# Patient Record
Sex: FEMALE | Race: WHITE | NOT HISPANIC OR LATINO | Employment: FULL TIME | URBAN - METROPOLITAN AREA
[De-identification: names, ages, dates, MRNs, and addresses within clinical notes are randomized per-mention and may not be internally consistent; named-entity substitution may affect disease eponyms.]

---

## 2020-01-30 ENCOUNTER — LAB VISIT (OUTPATIENT)
Dept: LAB | Facility: HOSPITAL | Age: 30
End: 2020-01-30
Attending: FAMILY MEDICINE
Payer: COMMERCIAL

## 2020-01-30 DIAGNOSIS — Z01.89 D DIMER VALUE UNKNOWN: Primary | ICD-10-CM

## 2020-01-30 LAB — D DIMER PPP FEU-MCNC: 135 NG/ML (ref 100–400)

## 2020-01-30 PROCEDURE — 85379 FIBRIN DEGRADATION QUANT: CPT

## 2020-01-30 PROCEDURE — 36415 COLL VENOUS BLD VENIPUNCTURE: CPT

## 2021-04-19 ENCOUNTER — OFFICE VISIT (OUTPATIENT)
Dept: URGENT CARE | Facility: CLINIC | Age: 31
End: 2021-04-19
Payer: COMMERCIAL

## 2021-04-19 VITALS
DIASTOLIC BLOOD PRESSURE: 83 MMHG | OXYGEN SATURATION: 97 % | SYSTOLIC BLOOD PRESSURE: 127 MMHG | BODY MASS INDEX: 44.41 KG/M2 | HEIGHT: 68 IN | HEART RATE: 93 BPM | TEMPERATURE: 97 F | WEIGHT: 293 LBS

## 2021-04-19 DIAGNOSIS — J02.9 ACUTE PHARYNGITIS, UNSPECIFIED ETIOLOGY: Primary | ICD-10-CM

## 2021-04-19 PROCEDURE — 99204 OFFICE O/P NEW MOD 45 MIN: CPT | Mod: S$GLB,,, | Performed by: NURSE PRACTITIONER

## 2021-04-19 PROCEDURE — 99204 PR OFFICE/OUTPT VISIT, NEW, LEVL IV, 45-59 MIN: ICD-10-PCS | Mod: S$GLB,,, | Performed by: NURSE PRACTITIONER

## 2021-04-19 RX ORDER — AMOXICILLIN 500 MG/1
500 TABLET, FILM COATED ORAL EVERY 12 HOURS
Qty: 20 TABLET | Refills: 0 | Status: SHIPPED | OUTPATIENT
Start: 2021-04-19 | End: 2021-04-29

## 2023-04-07 ENCOUNTER — OFFICE VISIT (OUTPATIENT)
Dept: URGENT CARE | Facility: CLINIC | Age: 33
End: 2023-04-07
Payer: COMMERCIAL

## 2023-04-07 VITALS
WEIGHT: 293 LBS | HEART RATE: 108 BPM | BODY MASS INDEX: 44.41 KG/M2 | TEMPERATURE: 98 F | HEIGHT: 68 IN | DIASTOLIC BLOOD PRESSURE: 78 MMHG | OXYGEN SATURATION: 97 % | SYSTOLIC BLOOD PRESSURE: 130 MMHG | RESPIRATION RATE: 16 BRPM

## 2023-04-07 DIAGNOSIS — H10.9 BACTERIAL CONJUNCTIVITIS OF RIGHT EYE: Primary | ICD-10-CM

## 2023-04-07 PROCEDURE — 99213 OFFICE O/P EST LOW 20 MIN: CPT | Mod: S$GLB,,, | Performed by: NURSE PRACTITIONER

## 2023-04-07 PROCEDURE — 99213 PR OFFICE/OUTPT VISIT, EST, LEVL III, 20-29 MIN: ICD-10-PCS | Mod: S$GLB,,, | Performed by: NURSE PRACTITIONER

## 2023-04-07 RX ORDER — CIPROFLOXACIN HYDROCHLORIDE 3 MG/ML
2 SOLUTION/ DROPS OPHTHALMIC EVERY 4 HOURS
Qty: 10 ML | Refills: 0 | Status: SHIPPED | OUTPATIENT
Start: 2023-04-07 | End: 2023-04-14

## 2023-04-07 RX ORDER — OFLOXACIN 3 MG/ML
2 SOLUTION/ DROPS OPHTHALMIC 4 TIMES DAILY
Qty: 5 ML | Refills: 0 | Status: SHIPPED | OUTPATIENT
Start: 2023-04-07 | End: 2023-04-07 | Stop reason: RX

## 2023-04-07 NOTE — PROGRESS NOTES
"Subjective:      Patient ID: Petty Donato is a 32 y.o. female.    Vitals:  height is 5' 8" (1.727 m) and weight is 177.8 kg (392 lb) (abnormal). Her temperature is 97.9 °F (36.6 °C). Her blood pressure is 130/78 and her pulse is 108. Her respiration is 16 and oxygen saturation is 97%.     Chief Complaint: Conjunctivitis    Pt states her right eye is red, states it began to itch today. started this morning but has gotten worse. Denies any visual changes. No contact lens use.    Constitution: Negative for chills and fever.   HENT:  Negative for ear pain.    Cardiovascular:  Negative for chest pain, palpitations and sob on exertion.   Eyes:  Positive for eye discharge, eye itching and eye redness. Negative for eye trauma, foreign body in eye, eye pain and photophobia.   Respiratory:  Negative for shortness of breath.     Objective:     Physical Exam   Constitutional: She is oriented to person, place, and time. She appears well-developed. She is cooperative.  Non-toxic appearance. She does not appear ill. No distress.   HENT:   Head: Normocephalic and atraumatic.   Ears:   Right Ear: External ear normal.   Left Ear: External ear normal.   Nose: Nose normal.   Mouth/Throat: Oropharynx is clear and moist and mucous membranes are normal. Mucous membranes are moist. Oropharynx is clear.   Eyes: Lids are normal. Pupils are equal, round, and reactive to light. Lids are everted and swept, no foreign bodies found. Right eye exhibits discharge (clear/watery). Right conjunctiva is injected. No scleral icterus. Extraocular movement intact vision grossly intact gaze aligned appropriately periorbital hyperpigmentation  Neck: Trachea normal and phonation normal. Neck supple.   Cardiovascular: Normal rate, regular rhythm, normal heart sounds and normal pulses.   Pulmonary/Chest: Effort normal and breath sounds normal.   Abdominal: Normal appearance.   Musculoskeletal:         General: No deformity.   Neurological: She is alert and " oriented to person, place, and time. She has normal strength and normal reflexes. No sensory deficit.   Skin: Skin is warm, dry, intact and not diaphoretic. Capillary refill takes 2 to 3 seconds.   Psychiatric: Her speech is normal and behavior is normal. Judgment and thought content normal.   Nursing note and vitals reviewed.    Assessment:     1. Bacterial conjunctivitis of right eye        Plan:       Bacterial conjunctivitis of right eye  -     ofloxacin (OCUFLOX) 0.3 % ophthalmic solution; Place 2 drops into the right eye 4 (four) times daily. for 7 days  Dispense: 5 mL; Refill: 0        I have discussed the physical exam findings with the patient. We discussed symptom monitoring, conservative care methods, medication use, and follow up orders. She verbalized understanding and agreement with the plan of care.          Polytrim and ofloxacin unavailable at pharmacy.  Ciprofloxacin E scribed.  Patient notified

## 2023-04-07 NOTE — PATIENT INSTRUCTIONS
Use ofloxacin drops as ordered  Do not touch face or wipe eyes if possible. Keep hands washed.  Do not allow others to touch eyes or face.  You may consider yourself non contagious after 24 hours of eyedrop application  After 3 days of treatment, wash all bedding and pillow cases  Follow-up with ophthalmology in 2-3 days  Return to this clinic for any new concerns

## 2023-04-18 ENCOUNTER — HOSPITAL ENCOUNTER (OUTPATIENT)
Dept: RADIOLOGY | Facility: HOSPITAL | Age: 33
Discharge: HOME | End: 2023-04-18
Attending: NURSE PRACTITIONER
Payer: COMMERCIAL

## 2023-04-18 DIAGNOSIS — M25.552 PAIN IN LEFT HIP: ICD-10-CM

## 2023-04-18 DIAGNOSIS — M79.671 PAIN IN RIGHT FOOT: ICD-10-CM

## 2023-04-18 DIAGNOSIS — M54.50 LOW BACK PAIN: ICD-10-CM

## 2023-06-15 ENCOUNTER — HOSPITAL ENCOUNTER (EMERGENCY)
Facility: HOSPITAL | Age: 33
Discharge: HOME | End: 2023-06-15
Attending: EMERGENCY MEDICINE
Payer: COMMERCIAL

## 2023-06-15 VITALS
OXYGEN SATURATION: 98 % | RESPIRATION RATE: 18 BRPM | SYSTOLIC BLOOD PRESSURE: 130 MMHG | HEART RATE: 84 BPM | TEMPERATURE: 98 F | DIASTOLIC BLOOD PRESSURE: 70 MMHG
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
## 2023-06-15 DIAGNOSIS — J10.1 INFLUENZA A: ICD-10-CM

## 2023-06-15 DIAGNOSIS — S42.331A CLOSED DISPLACED OBLIQUE FRACTURE OF SHAFT OF RIGHT HUMERUS, INITIAL ENCOUNTER: Primary | ICD-10-CM

## 2023-06-15 LAB
B-HCG UR QL: NEGATIVE
INFLUENZA A, MOLECULAR: POSITIVE
INFLUENZA B, MOLECULAR: NEGATIVE
SARS-COV-2 RDRP RESP QL NAA+PROBE: NEGATIVE
SPECIMEN SOURCE: ABNORMAL

## 2023-06-15 PROCEDURE — 81025 URINE PREGNANCY TEST: CPT | Performed by: EMERGENCY MEDICINE

## 2023-06-15 PROCEDURE — 96372 THER/PROPH/DIAG INJ SC/IM: CPT | Performed by: EMERGENCY MEDICINE

## 2023-06-15 PROCEDURE — U0002 COVID-19 LAB TEST NON-CDC: HCPCS | Performed by: EMERGENCY MEDICINE

## 2023-06-15 PROCEDURE — 87502 INFLUENZA DNA AMP PROBE: CPT | Performed by: EMERGENCY MEDICINE

## 2023-06-15 PROCEDURE — 99284 EMERGENCY DEPT VISIT MOD MDM: CPT

## 2023-06-15 PROCEDURE — 63600175 PHARM REV CODE 636 W HCPCS: Performed by: EMERGENCY MEDICINE

## 2023-06-15 RX ORDER — MORPHINE SULFATE 10 MG/ML
5 INJECTION INTRAMUSCULAR; INTRAVENOUS; SUBCUTANEOUS
Status: COMPLETED | OUTPATIENT
Start: 2023-06-15 | End: 2023-06-15

## 2023-06-15 RX ORDER — HYDROCODONE BITARTRATE AND ACETAMINOPHEN 5; 325 MG/1; MG/1
1 TABLET ORAL EVERY 6 HOURS PRN
Qty: 10 TABLET | Refills: 0 | Status: SHIPPED | OUTPATIENT
Start: 2023-06-15 | End: 2023-06-20 | Stop reason: SDUPTHER

## 2023-06-15 RX ADMIN — MORPHINE SULFATE 5 MG: 10 INJECTION INTRAVENOUS at 04:06

## 2023-06-15 NOTE — ED NOTES
Went over discharge instructions with patient.  Patient verbalizes understanding of home care instructions.  Provided written instructions as well.  VS stable.  Pt ambulatory off floor.
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
## 2023-06-15 NOTE — Clinical Note
"Petty Ca" Tanmay was seen and treated in our emergency department on 6/15/2023.  She may return to work on 06/19/2023.       If you have any questions or concerns, please don't hesitate to call.      Analia ANDERSON RN    "

## 2023-06-20 ENCOUNTER — PATIENT MESSAGE (OUTPATIENT)
Dept: ORTHOPEDICS | Facility: CLINIC | Age: 33
End: 2023-06-20

## 2023-06-20 ENCOUNTER — OFFICE VISIT (OUTPATIENT)
Dept: ORTHOPEDICS | Facility: CLINIC | Age: 33
End: 2023-06-20
Payer: COMMERCIAL

## 2023-06-20 VITALS — WEIGHT: 293 LBS | BODY MASS INDEX: 44.41 KG/M2 | RESPIRATION RATE: 16 BRPM | HEIGHT: 68 IN

## 2023-06-20 DIAGNOSIS — S42.491A OTHER CLOSED DISPLACED FRACTURE OF DISTAL END OF RIGHT HUMERUS, INITIAL ENCOUNTER: ICD-10-CM

## 2023-06-20 DIAGNOSIS — S42.491A OTHER CLOSED DISPLACED FRACTURE OF DISTAL END OF RIGHT HUMERUS, INITIAL ENCOUNTER: Primary | ICD-10-CM

## 2023-06-20 PROCEDURE — 99999 PR PBB SHADOW E&M-EST. PATIENT-LVL III: CPT | Mod: PBBFAC,,, | Performed by: ORTHOPAEDIC SURGERY

## 2023-06-20 PROCEDURE — 99204 PR OFFICE/OUTPT VISIT, NEW, LEVL IV, 45-59 MIN: ICD-10-PCS | Mod: S$GLB,,, | Performed by: ORTHOPAEDIC SURGERY

## 2023-06-20 PROCEDURE — 99204 OFFICE O/P NEW MOD 45 MIN: CPT | Mod: S$GLB,,, | Performed by: ORTHOPAEDIC SURGERY

## 2023-06-20 PROCEDURE — 99999 PR PBB SHADOW E&M-EST. PATIENT-LVL III: ICD-10-PCS | Mod: PBBFAC,,, | Performed by: ORTHOPAEDIC SURGERY

## 2023-06-20 RX ORDER — HYDROCODONE BITARTRATE AND ACETAMINOPHEN 5; 325 MG/1; MG/1
1 TABLET ORAL EVERY 6 HOURS PRN
Qty: 28 TABLET | Refills: 0 | Status: ON HOLD | OUTPATIENT
Start: 2023-06-20 | End: 2023-06-23 | Stop reason: HOSPADM

## 2023-06-20 RX ORDER — HYDROCODONE BITARTRATE AND ACETAMINOPHEN 5; 325 MG/1; MG/1
1 TABLET ORAL EVERY 6 HOURS PRN
Qty: 28 TABLET | Refills: 0 | Status: CANCELLED | OUTPATIENT
Start: 2023-06-20
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
## 2023-06-21 DIAGNOSIS — S42.491A OTHER CLOSED DISPLACED FRACTURE OF DISTAL END OF RIGHT HUMERUS, INITIAL ENCOUNTER: Primary | ICD-10-CM

## 2023-06-21 DIAGNOSIS — S42.401A CLOSED FRACTURE OF RIGHT DISTAL HUMERUS: ICD-10-CM

## 2023-06-21 RX ORDER — SODIUM CHLORIDE 0.9 % (FLUSH) 0.9 %
10 SYRINGE (ML) INJECTION EVERY 6 HOURS PRN
Status: DISCONTINUED | OUTPATIENT
Start: 2023-06-21 | End: 2023-11-16

## 2023-06-22 ENCOUNTER — OFFICE VISIT (OUTPATIENT)
Dept: ORTHOPEDICS | Facility: CLINIC | Age: 33
End: 2023-06-22
Payer: COMMERCIAL

## 2023-06-22 VITALS — HEIGHT: 68 IN | WEIGHT: 293 LBS | BODY MASS INDEX: 44.41 KG/M2

## 2023-06-22 DIAGNOSIS — S42.491A OTHER CLOSED DISPLACED FRACTURE OF DISTAL END OF RIGHT HUMERUS, INITIAL ENCOUNTER: Primary | ICD-10-CM

## 2023-06-22 PROCEDURE — 99214 OFFICE O/P EST MOD 30 MIN: CPT | Mod: 57,S$GLB,, | Performed by: ORTHOPAEDIC SURGERY

## 2023-06-22 PROCEDURE — 99999 PR PBB SHADOW E&M-EST. PATIENT-LVL III: CPT | Mod: PBBFAC,,, | Performed by: ORTHOPAEDIC SURGERY

## 2023-06-22 PROCEDURE — 99999 PR PBB SHADOW E&M-EST. PATIENT-LVL III: ICD-10-PCS | Mod: PBBFAC,,, | Performed by: ORTHOPAEDIC SURGERY

## 2023-06-22 PROCEDURE — 99214 PR OFFICE/OUTPT VISIT, EST, LEVL IV, 30-39 MIN: ICD-10-PCS | Mod: 57,S$GLB,, | Performed by: ORTHOPAEDIC SURGERY

## 2023-06-23 PROBLEM — S42.401A CLOSED FRACTURE OF RIGHT DISTAL HUMERUS: Status: ACTIVE | Noted: 2023-06-23
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
## 2023-06-27 DIAGNOSIS — S42.491A OTHER CLOSED DISPLACED FRACTURE OF DISTAL END OF RIGHT HUMERUS, INITIAL ENCOUNTER: Primary | ICD-10-CM

## 2023-06-27 RX ORDER — OXYCODONE AND ACETAMINOPHEN 10; 325 MG/1; MG/1
1 TABLET ORAL EVERY 6 HOURS PRN
Qty: 22 TABLET | Refills: 0 | Status: SHIPPED | OUTPATIENT
Start: 2023-06-27 | End: 2023-08-15

## 2023-07-06 ENCOUNTER — HOSPITAL ENCOUNTER (OUTPATIENT)
Dept: RADIOLOGY | Facility: HOSPITAL | Age: 33
Discharge: HOME | End: 2023-07-06
Attending: ORTHOPAEDIC SURGERY
Payer: COMMERCIAL

## 2023-07-06 ENCOUNTER — OFFICE VISIT (OUTPATIENT)
Dept: ORTHOPEDICS | Facility: CLINIC | Age: 33
End: 2023-07-06
Payer: COMMERCIAL

## 2023-07-06 VITALS — BODY MASS INDEX: 44.41 KG/M2 | HEIGHT: 68 IN | WEIGHT: 293 LBS

## 2023-07-06 DIAGNOSIS — S42.491A OTHER CLOSED DISPLACED FRACTURE OF DISTAL END OF RIGHT HUMERUS, INITIAL ENCOUNTER: ICD-10-CM

## 2023-07-06 DIAGNOSIS — S42.491D OTHER CLOSED DISPLACED FRACTURE OF DISTAL END OF RIGHT HUMERUS WITH ROUTINE HEALING, SUBSEQUENT ENCOUNTER: Primary | ICD-10-CM

## 2023-07-06 PROCEDURE — 99999 PR PBB SHADOW E&M-EST. PATIENT-LVL II: CPT | Mod: PBBFAC,,, | Performed by: ORTHOPAEDIC SURGERY

## 2023-07-06 PROCEDURE — 99024 PR POST-OP FOLLOW-UP VISIT: ICD-10-PCS | Mod: S$GLB,,, | Performed by: ORTHOPAEDIC SURGERY

## 2023-07-06 PROCEDURE — 73060 X-RAY EXAM OF HUMERUS: CPT | Mod: TC,PO,RT

## 2023-07-06 PROCEDURE — 73060 XR HUMERUS 2 VIEW RIGHT: ICD-10-PCS | Mod: 26,RT,, | Performed by: RADIOLOGY

## 2023-07-06 PROCEDURE — 99024 POSTOP FOLLOW-UP VISIT: CPT | Mod: S$GLB,,, | Performed by: ORTHOPAEDIC SURGERY

## 2023-07-06 PROCEDURE — 73060 X-RAY EXAM OF HUMERUS: CPT | Mod: 26,RT,, | Performed by: RADIOLOGY

## 2023-07-06 PROCEDURE — 99999 PR PBB SHADOW E&M-EST. PATIENT-LVL II: ICD-10-PCS | Mod: PBBFAC,,, | Performed by: ORTHOPAEDIC SURGERY

## 2023-07-06 NOTE — PROGRESS NOTES
Chief Complaint   Patient presents with    Right Upper Arm - Pain, Post-op Evaluation       HPI:  33 y.o. returns to clinic today status post  right humerus ORIF 2 weeks ago. Pain is mild. Patient is compliant most of the time with restrictions.     RUE    Incision healed. No erythema or fluctuance. Overall normal alignment. Mild point TTP about the fracture site. Decreased ROM due to stiffness. Compartments soft. Skin intact. NVI distally.      X-rays were performed today, personally reviewed by me and findings discussed with the patient.  2 views of the right humerus show hardware intact in good position    Other closed displaced fracture of distal end of right humerus with routine healing, subsequent encounter        Staples out. Elbow ROM. RTC 4-6 weeks Shayla at which time we will advance wb.

## 2023-07-21 ENCOUNTER — PATIENT MESSAGE (OUTPATIENT)
Dept: ORTHOPEDICS | Facility: CLINIC | Age: 33
End: 2023-07-21
Payer: COMMERCIAL

## 2023-07-31 DIAGNOSIS — S42.491D OTHER CLOSED DISPLACED FRACTURE OF DISTAL END OF RIGHT HUMERUS WITH ROUTINE HEALING, SUBSEQUENT ENCOUNTER: Primary | ICD-10-CM

## 2023-08-03 ENCOUNTER — OFFICE VISIT (OUTPATIENT)
Dept: ORTHOPEDICS | Facility: CLINIC | Age: 33
End: 2023-08-03
Payer: COMMERCIAL

## 2023-08-03 ENCOUNTER — HOSPITAL ENCOUNTER (OUTPATIENT)
Dept: RADIOLOGY | Facility: HOSPITAL | Age: 33
Discharge: HOME | End: 2023-08-03
Attending: NURSE PRACTITIONER
Payer: COMMERCIAL

## 2023-08-03 VITALS — BODY MASS INDEX: 44.41 KG/M2 | WEIGHT: 293 LBS | HEIGHT: 68 IN

## 2023-08-03 DIAGNOSIS — Z98.890 S/P ORIF (OPEN REDUCTION INTERNAL FIXATION) FRACTURE: ICD-10-CM

## 2023-08-03 DIAGNOSIS — S42.491D OTHER CLOSED DISPLACED FRACTURE OF DISTAL END OF RIGHT HUMERUS WITH ROUTINE HEALING, SUBSEQUENT ENCOUNTER: Primary | ICD-10-CM

## 2023-08-03 DIAGNOSIS — Z87.81 S/P ORIF (OPEN REDUCTION INTERNAL FIXATION) FRACTURE: ICD-10-CM

## 2023-08-03 DIAGNOSIS — S42.491D OTHER CLOSED DISPLACED FRACTURE OF DISTAL END OF RIGHT HUMERUS WITH ROUTINE HEALING, SUBSEQUENT ENCOUNTER: ICD-10-CM

## 2023-08-03 PROCEDURE — 73060 X-RAY EXAM OF HUMERUS: CPT | Mod: 26,RT,, | Performed by: RADIOLOGY

## 2023-08-03 PROCEDURE — 99024 PR POST-OP FOLLOW-UP VISIT: ICD-10-PCS | Mod: S$GLB,,, | Performed by: NURSE PRACTITIONER

## 2023-08-03 PROCEDURE — 99024 POSTOP FOLLOW-UP VISIT: CPT | Mod: S$GLB,,, | Performed by: NURSE PRACTITIONER

## 2023-08-03 PROCEDURE — 99999 PR PBB SHADOW E&M-EST. PATIENT-LVL III: CPT | Mod: PBBFAC,,, | Performed by: NURSE PRACTITIONER

## 2023-08-03 PROCEDURE — 73060 X-RAY EXAM OF HUMERUS: CPT | Mod: TC,PO,RT

## 2023-08-03 PROCEDURE — 73060 XR HUMERUS 2 VIEW RIGHT: ICD-10-PCS | Mod: 26,RT,, | Performed by: RADIOLOGY

## 2023-08-03 PROCEDURE — 99999 PR PBB SHADOW E&M-EST. PATIENT-LVL III: ICD-10-PCS | Mod: PBBFAC,,, | Performed by: NURSE PRACTITIONER

## 2023-08-03 NOTE — LETTER
August 3, 2023      [facility]  [address] [place] [address]  Phone: [phone]       Patient: Petty Donato   YOB: 1990  Date of Visit: 08/03/2023    To Whom It May Concern:    Cary Donato  was at [facility] on 08/03/2023. The patient may return to work on 08/17/2023 with no restrictions. If you have any questions or concerns, or if I can be of further assistance, please do not hesitate to contact me.    Sincerely,    JAN Corley

## 2023-08-03 NOTE — PROGRESS NOTES
Chief Complaint   Patient presents with    Right Arm - Post-op Evaluation       HPI:  33 y.o. returns to clinic today status post ORIF right humerus  8 weeks ago. Pain is intermittent. Patient is compliant most of the time with restrictions.     RUE     Incision healed. No erythema or fluctuance. Overall normal alignment. Mild point TTP about the fracture site. Decreased ROM due to stiffness. Compartments soft. Skin intact. NVI distally.    X-rays were performed today, personally reviewed by me and findings discussed with the patient.  3 views of the right humerus show routine right humeral ORIF changes demonstrating stable alignment.          Other closed displaced fracture of distal end of right humerus with routine healing, subsequent encounter  -     Ambulatory referral/consult to Physical/Occupational Therapy; Future; Expected date: 08/10/2023    S/P ORIF (open reduction internal fixation) fracture  -     Ambulatory referral/consult to Physical/Occupational Therapy; Future; Expected date: 08/10/2023          Begin PWB 25% x 2 weeks, then 50% x 2 weeks, then WBAT x 2 weeks  eval and treat  3x/wk x 6 wks    Rtc in 6 weeks

## 2023-08-04 ENCOUNTER — PATIENT MESSAGE (OUTPATIENT)
Dept: ORTHOPEDICS | Facility: CLINIC | Age: 33
End: 2023-08-04
Payer: COMMERCIAL

## 2023-08-04 ENCOUNTER — CLINICAL SUPPORT (OUTPATIENT)
Dept: REHABILITATION | Facility: HOSPITAL | Age: 33
End: 2023-08-04
Attending: NURSE PRACTITIONER
Payer: COMMERCIAL

## 2023-08-04 DIAGNOSIS — S42.491D OTHER CLOSED DISPLACED FRACTURE OF DISTAL END OF RIGHT HUMERUS WITH ROUTINE HEALING, SUBSEQUENT ENCOUNTER: ICD-10-CM

## 2023-08-04 DIAGNOSIS — Z87.81 S/P ORIF (OPEN REDUCTION INTERNAL FIXATION) FRACTURE: ICD-10-CM

## 2023-08-04 DIAGNOSIS — M25.621 DECREASED RANGE OF MOTION OF ELBOW, RIGHT: ICD-10-CM

## 2023-08-04 DIAGNOSIS — Z98.890 S/P ORIF (OPEN REDUCTION INTERNAL FIXATION) FRACTURE: ICD-10-CM

## 2023-08-04 DIAGNOSIS — M25.521 PAIN IN JOINT OF RIGHT ELBOW: ICD-10-CM

## 2023-08-04 PROCEDURE — 97110 THERAPEUTIC EXERCISES: CPT | Mod: PN

## 2023-08-04 PROCEDURE — 97165 OT EVAL LOW COMPLEX 30 MIN: CPT | Mod: PN
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
## 2023-08-09 NOTE — PROGRESS NOTES
"                                    Occupational Therapy Daily Treatment Note       Date: 8/16/2023  Name: Petty Donato  Clinic Number: [id number]    Therapy Diagnosis: S42.491D (ICD-10-CM) - Other closed displaced fracture of distal end of right humerus with routine healing, Z98.890,Z87.81 (ICD-10-CM) - S/P ORIF (open reduction internal fixation) fracture  Encounter Diagnoses   Name Primary?    Decreased range of motion of elbow, right Yes    Pain in joint of right elbow      Physician: Shayla Bateman FNP    Physician Orders: S42.491D (ICD-10-CM) - Other closed displaced fracture of distal end of right humerus with routine healing, Z98.890,Z87.81 (ICD-10-CM) - S/P ORIF (open reduction internal fixation) fracture; evaluate and treat;  Begin PWB 25% x 2 weeks, then 50% x 2 weeks, then WBAT x 2 weeks  Medical Diagnosis: S42.491D (ICD-10-CM) - Other closed displaced fracture of distal end of right humerus with routine healing, Z98.890,Z87.81 (ICD-10-CM) - S/P ORIF (open reduction internal fixation) fracture  Surgical Procedure and Date:  s/p ORIF (Right) distal humeral shaft fracture ; 6/23/2023    Insurance Authorization Period Expiration: 08/04/2023-12/31/2023  Plan of Care Certification Period: 08/04/2023-10/04/2023  Date of Return to MD: 11/02/2023    Evaluation FOTO: 08/04/2023 = NA    Visit # / Visits authorized: 1 / 20   Time In:  3:05  Time Out: 3:50  Total Billable Time: 40  minutes    Precautions:  Standard  ;  Per 08/03/2023 follow up= Begin PWB 25% x 2 weeks, then 50% x 2 weeks (08/17/2023) , then WBAT x 2 weeks (08/31/2023)       Post Op:  06/23/2023 = 7 weeks, 5 days      Subjective     Patient reports: "I am not having any pain in my elbow today.  Since the evaluation I have not had much pain.  I feel stretching when I do some of the exercises."     Pain: 1/10   Location of pain: (Right) elbow     Objective    Patient seen by Occupational Therapy this session. Tx consisted of:        Manual therapy " techniques to increase joint mobilization    x     8 minutes:       -Manual Therapy techniques to  (Right) elbow  including stretching, and Passive Range of Motion to increase joint mobility, range of motion  and for pain management  x  6 minutes     -Soft Tissue Massage to distal biceps tendon and muscle belly to relax muscle and increase range of motion and functional abilities  x  2  minutes         Therapeutic  Exercises to improve functional performance while increasing strength, endurance, range of motion,  and flexibility  x     20 minutes:    - Low load prolonged stretch with Moist Heat (on towel roll) in supine for elbow Extension x 5 minutes ( rest breaks as needed)    - Active Assistive Range of Motion for elbow Extension and flexion  x 10 repetitions  - Weighted ball on tabletop for elbow Extension / flexion stretches x 10 repetitions   - 0# pulleys for Flexion and elbow Extension  x 2 minutes   - 0# bicep curls for elbow Extension and flexion x 10 repetitions   - 0# dowel in supine for ER (towel roll) x 15 sec holds x 5 repetitions  - 0# dowel in standing for Extension and Internal Rotation x 10 repetitions    - YELLOW Theraband in standing for scap retraction x 20 repetitions   - Soccer ball for External Rotation / Internal Rotation and Supination  /Pronation        Therapeutic Activities  to improve functional performance while increasing independence with ADLs and IADLs  x     12 minutes:    - LARGE dowel board for elbow Extension /Flexion and Supination /Pronation   - Wrist wheel for Supination / Pronation stretch x 20 repetitions   - 25# Progressive Hand Gripper (black spring) for composite  x 20 repetitions  - RED digiflex for isolated x 10 repetitions;  GREEN digiflex for composite digital flexion x 20 repetitions    - Wooden pegboard with GREEN clothespins with 3PT pinch x 2 repetitions    -Updated Home Exercise Program: Patient provided with and educated on written Home Exercise Program for  "dowel for Extension and Internal Rotation .  See EMR under "patient instructions."  Patient demo understanding of above.        -NP = Not Performed     Initiate in future therapy sessions:    - Internal Rotation pulleys         - 1# UBE in clockwise motion x 6 minutes to increase endurance and range of motion   - Biceps stretch on wall with Elbow Extension x 15 second holds x 5 repetitions    - ### in side lying for External Rotation ( towel) x 10 repetitions   -  0# in supine for External Rotation( @ side); Internal Rotation /External Rotation   (@90* shoulder Abduction) (towel roll) x 10 repetitions  - Isometrics with   ####   Theraband for External Rotation and Internal Rotation x 10 repetitions  -  ### Theraband for shoulder Flexion, Extension, External Rotation and Internal Rotation x ###  repetitions   -  ### for scapular stabilization x    ###  seconds x   ### repetitions   - ### Theraband for (Bilateral) Horizontal  Abduction x ## repetitions     - ### Theraputty for pulling, PVC for "cookie cutting" and medicine top x 10  Repetitions    - YELLOW digiweb for composite digital Extension and  intrinsics x 20 repetitions  -  #### dowel for Supination /Pronation  x 20 repetitions    - ### Flexbar for Supination /Pronation  and Dorsiflexion /Volar Flexion x 20 repetitions    - ### Wrist exerstick in standing for Dorsiflexion / Volar Flexion  x 3 repetitions                        Assessment     Patient will continue to benefit from skilled Occupational Therapy intervention to increase functional abilities, range of motion, and strength and pain control.  Patient. demonstrated proper understanding of each exercise.  Patient continues to require verbal and tactile cues for throughout therapy session to maintain position and prevent compensation.   Patient continues to be limited in functional and leisurely pursuits. Pain limits patient's participation in activities of daily living.  Patient is not able to carryout " "necessary vocational tasks.   Patient's spiritual, cultural and educational needs considered and patient agreeable to plan of care and goals.  Patient is making good progress towards established goals.  Patient tolerated exercises / activities within pain tolerance.   Reviewed Home Exercise Program with patient., see EPIC under "patient instructions" for provided exercises, activity modifications and limitations, modalities for home pain management.  Patient. demo understanding of above.         New/Revised Goals: Continue Plan Of Care   Goals:   Short Term (4 weeks on 9/4/2023):  1)   Patient to be IND with HEP and modalities for pain management.  (MET)   2) Patient will be independent with dressing and wiping herself in the bathroom.   (In Progress)   3)   Increase AROM 15 degrees for shoulder flexion and abduction  to increase functional hand use for overhead reaching activities.  (In Progress)   4)   Decrease edema .2-.3 cm to increase joint mobility /flexibility for improved overall functional hand use.  (In Progress)   5)   Increase AROM of elbow extension and flexion 10 to 15 to increase functional hand use for ADLs/work/leisure activities.   (In Progress)   6) Decrease pain to 3/10 at worst in her RUE.   (In Progress)      Long Term (by discharge):  1)   Pt will report 1 out of 10 pain with right arm use. (In Progress)   2)   Pt will return to prior level of function for ADLs and household management.  (In Progress)   3) Pt will return to work with no restrictions.  ( In Progress)     Plan      Continue 2x week during the 90 day certification period  08/04/2023  to 10/04/2023   with established Plan Of Care in pursuit of Occupational Therapy goals.      Kiesha Lemus OT      "

## 2023-08-16 ENCOUNTER — CLINICAL SUPPORT (OUTPATIENT)
Dept: REHABILITATION | Facility: HOSPITAL | Age: 33
End: 2023-08-16
Payer: COMMERCIAL

## 2023-08-16 DIAGNOSIS — M25.521 PAIN IN JOINT OF RIGHT ELBOW: ICD-10-CM

## 2023-08-16 DIAGNOSIS — M25.621 DECREASED RANGE OF MOTION OF ELBOW, RIGHT: Primary | ICD-10-CM

## 2023-08-16 PROCEDURE — 97530 THERAPEUTIC ACTIVITIES: CPT | Mod: PN

## 2023-08-16 PROCEDURE — 97140 MANUAL THERAPY 1/> REGIONS: CPT | Mod: PN

## 2023-08-16 PROCEDURE — 97110 THERAPEUTIC EXERCISES: CPT | Mod: PN

## 2023-08-16 NOTE — PROGRESS NOTES
"                                    Occupational Therapy Daily Treatment Note       Date: 8/22/2023  Name: Petty Donato  Clinic Number: [id number]    Therapy Diagnosis: S42.491D (ICD-10-CM) - Other closed displaced fracture of distal end of right humerus with routine healing, Z98.890,Z87.81 (ICD-10-CM) - S/P ORIF (open reduction internal fixation) fracture  Encounter Diagnoses   Name Primary?    Pain in joint of right elbow Yes    Decreased range of motion of elbow, right      Physician: Shayla Bateman FNP    Physician Orders: S42.491D (ICD-10-CM) - Other closed displaced fracture of distal end of right humerus with routine healing, Z98.890,Z87.81 (ICD-10-CM) - S/P ORIF (open reduction internal fixation) fracture; evaluate and treat;  Begin PWB 25% x 2 weeks, then 50% x 2 weeks, then WBAT x 2 weeks  Medical Diagnosis: S42.491D (ICD-10-CM) - Other closed displaced fracture of distal end of right humerus with routine healing, Z98.890,Z87.81 (ICD-10-CM) - S/P ORIF (open reduction internal fixation) fracture  Surgical Procedure and Date:  s/p ORIF (Right) distal humeral shaft fracture ; 6/23/2023    Insurance Authorization Period Expiration: 08/04/2023-12/31/2023  Plan of Care Certification Period: 08/04/2023-10/04/2023  Date of Return to MD: 11/02/2023    Evaluation FOTO: 08/04/2023 = NA    Visit # / Visits authorized: 2 / 20   Time In:  7:45  Time Out: 8:25   Total Billable Time: 40  minutes    Precautions:  Standard  ;  Per 08/03/2023 follow up= Begin PWB 25% x 2 weeks, then 50% x 2 weeks (08/17/2023) , then WBAT x 2 weeks (08/31/2023)       Post Op:  06/23/2023 = 8 weeks, 4 days      Subjective     Patient reports: "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  I did notice that I am able to reach behind my back better.  Driving and brushing my hair is still a challenge."     Pain: 2/10   Location of pain: (Right) elbow     Objective    Patient seen by Occupational Therapy this session. " Tx consisted of:        Manual therapy techniques to increase joint mobilization    x     8 minutes:       -Manual Therapy techniques to  (Right) elbow  including stretching, and Passive Range of Motion to increase joint mobility, range of motion  and for pain management  x  6 minutes     -Soft Tissue Massage to distal biceps tendon and muscle belly to relax muscle and increase range of motion and functional abilities  x  2  minutes         Therapeutic  Exercises to improve functional performance while increasing strength, endurance, range of motion,  and flexibility  x     20 minutes:    - Low load prolonged stretch with Moist Heat (on towel roll) in supine for elbow Extension x 5 minutes ( rest breaks as needed)    - Active Assistive Range of Motion for elbow Extension and flexion  x 10 repetitions  - Weighted ball on tabletop for elbow Extension / flexion stretches x 10 repetitions   - 0# pulleys for Flexion and elbow Extension  x 2 minutes   - 1# bicep curls for elbow Extension and flexion x 10 repetitions   - 0# dowel in supine for ER (towel roll) x 15 sec holds x 5 repetitions  -NP  - 0# dowel in standing for Extension and Internal Rotation x 10 repetitions    - YELLOW Theraband in standing for scap retraction x 20 repetitions   - Soccer ball for External Rotation / Internal Rotation and Supination  /Pronation  -NP  - YELLOW digiweb for composite digital Extension and  intrinsics x 10 repetitions    - 0.5# dowel for Supination /Pronation stretch  x 10 repetitions     - RED Theraputty for pulling x 10 repetitions         Therapeutic Activities  to improve functional performance while increasing independence with ADLs and IADLs  x     12 minutes:    - LARGE dowel board for elbow Extension /Flexion and Supination /Pronation   - 25# Progressive Hand Gripper (black spring) for composite  x 20 repetitions  - RED digiflex for isolated x 10 repetitions;  GREEN digiflex for composite digital flexion x 20  "repetitions    - Wooden pegboard with GREEN clothespins with 3PT pinch x 2 repetitions  - RED Theraputty for PVC for "cookie cutting" and medicine top x 20  Repetitions         -NP = Not Performed     Initiate in future therapy sessions:    - Internal Rotation pulleys         - 1# UBE in clockwise motion x 6 minutes to increase endurance and range of motion   - Biceps stretch on wall with Elbow Extension x 15 second holds x 5 repetitions  - ### Theraband with Wrist wheel for Supination / Pronation x 10 repetitions     - ### in side lying for External Rotation ( towel) x 10 repetitions   -  0# in supine for External Rotation( @ side); Internal Rotation /External Rotation   (@90* shoulder Abduction) (towel roll) x 10 repetitions  - Isometrics with   ####   Theraband for External Rotation and Internal Rotation x 10 repetitions  -  ### Theraband for shoulder Flexion, Extension, External Rotation and Internal Rotation x ###  repetitions     - ### Flexbar for Supination /Pronation  and Dorsiflexion /Volar Flexion x 20 repetitions    - ### Wrist exerstick in standing for Dorsiflexion / Volar Flexion  x 3 repetitions           Assessment     Patient will continue to benefit from skilled Occupational Therapy intervention to increase functional abilities, range of motion, and strength and pain control.  Patient. demonstrated proper understanding of each exercise.  Patient continues to require verbal and tactile cues for throughout therapy session to maintain position and prevent compensation.   Patient continues to be limited in functional and leisurely pursuits. Pain limits patient's participation in activities of daily living.  Patient is not able to carryout necessary vocational tasks.   Patient's spiritual, cultural and educational needs considered and patient agreeable to plan of care and goals.  Patient is making good progress towards established goals.  Patient tolerated exercises / activities within pain tolerance.   " "Reviewed Home Exercise Program with patient., see EPIC under "patient instructions" for provided exercises, activity modifications and limitations, modalities for home pain management.  Patient. demo understanding of above.    Patient tolerated addition of Theraputty for pulling,  Theraputty for PVC for "cookie cutting" and medicine top, digiweb for intrinsics and composite digital Extension;  increase in resistance during bicep curls for elbow Extension and flexion with no reported pain.   Patient tolerated addition of dowel for Supination / Pronation with tolerable stretching pain reported.     Active range of motion: post session (Right)   Elbow Extension / flexion: 20 (+30) / 119 (+4)   Supination / Pronation: 75 (+5) / 90    Patient demo increased Active range of motion for (Right) elbow Extension and flexion and Supination post session.      New/Revised Goals: Continue Plan Of Care   Goals:   Short Term (4 weeks on 9/4/2023):  1)   Patient to be IND with HEP and modalities for pain management.  (MET)   2) Patient will be independent with dressing and wiping herself in the bathroom.   (In Progress)   3)   Increase AROM 15 degrees for shoulder flexion and abduction  to increase functional hand use for overhead reaching activities.  (In Progress)   4)   Decrease edema .2-.3 cm to increase joint mobility /flexibility for improved overall functional hand use.  (In Progress)   5)   Increase AROM of elbow extension and flexion 10 to 15 to increase functional hand use for ADLs/work/leisure activities.   (In Progress)   6) Decrease pain to 3/10 at worst in her RUE.   (In Progress)      Long Term (by discharge):  1)   Pt will report 1 out of 10 pain with right arm use. (In Progress)   2)   Pt will return to prior level of function for ADLs and household management.  (In Progress)   3) Pt will return to work with no restrictions.  ( In Progress)     Plan      Continue 2x week during the 90 day certification period  " 08/04/2023  to 10/04/2023   with established Plan Of Care in pursuit of Occupational Therapy goals.      Kiesha Lemus, OT

## 2023-08-22 ENCOUNTER — CLINICAL SUPPORT (OUTPATIENT)
Dept: REHABILITATION | Facility: HOSPITAL | Age: 33
End: 2023-08-22
Payer: COMMERCIAL

## 2023-08-22 DIAGNOSIS — M25.521 PAIN IN JOINT OF RIGHT ELBOW: Primary | ICD-10-CM

## 2023-08-22 DIAGNOSIS — M25.621 DECREASED RANGE OF MOTION OF ELBOW, RIGHT: ICD-10-CM

## 2023-08-22 PROCEDURE — 97110 THERAPEUTIC EXERCISES: CPT | Mod: PN

## 2023-08-22 PROCEDURE — 97140 MANUAL THERAPY 1/> REGIONS: CPT | Mod: PN

## 2023-08-22 PROCEDURE — 97530 THERAPEUTIC ACTIVITIES: CPT | Mod: PN

## 2023-08-22 NOTE — PROGRESS NOTES
"                                Occupational Therapy Daily Treatment Note       Date: 8/24/2023  Name: Petty Donato  Clinic Number: [id number]    Therapy Diagnosis: S42.491D (ICD-10-CM) - Other closed displaced fracture of distal end of right humerus with routine healing, Z98.890,Z87.81 (ICD-10-CM) - S/P ORIF (open reduction internal fixation) fracture  Encounter Diagnoses   Name Primary?    Pain in joint of right elbow Yes    Decreased range of motion of elbow, right      Physician: Shayla Bateman FNP    Physician Orders: S42.491D (ICD-10-CM) - Other closed displaced fracture of distal end of right humerus with routine healing, Z98.890,Z87.81 (ICD-10-CM) - S/P ORIF (open reduction internal fixation) fracture; evaluate and treat;  Begin PWB 25% x 2 weeks, then 50% x 2 weeks, then WBAT x 2 weeks  Medical Diagnosis: S42.491D (ICD-10-CM) - Other closed displaced fracture of distal end of right humerus with routine healing, Z98.890,Z87.81 (ICD-10-CM) - S/P ORIF (open reduction internal fixation) fracture  Surgical Procedure and Date:  s/p ORIF (Right) distal humeral shaft fracture ; 6/23/2023    Insurance Authorization Period Expiration: 08/04/2023-12/31/2023  Plan of Care Certification Period: 08/04/2023-10/04/2023  Date of Return to MD: 11/02/2023    Evaluation FOTO: 08/04/2023 = NA    Visit # / Visits authorized: 3 / 20   Time In:  7:40  Time Out: 8:20   Total Billable Time: 40  minutes    Precautions:  Standard  ;  Per 08/03/2023 follow up= Begin PWB 25% x 2 weeks, then 50% x 2 weeks (08/17/2023) , then WBAT x 2 weeks (08/31/2023)       Post Op:  06/23/2023 = 8 weeks, 6 days      Subjective     Patient reports: "I went back to work Tuesday after therapy.  I was a little sore after.  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 Tx consisted of:        Manual therapy techniques to " increase joint mobilization    x     8 minutes:       -Manual Therapy techniques to  (Right) elbow  including stretching, and Passive Range of Motion to increase joint mobility, range of motion  and for pain management  x  5 minutes     -Soft Tissue Massage to distal biceps tendon and extensor muscle belly to relax muscle and increase range of motion and functional abilities  x  3  minutes         Therapeutic  Exercises to improve functional performance while increasing strength, endurance, range of motion,  and flexibility  x     20 minutes:    - Low load prolonged stretch with Moist Heat (on towel roll) with 0.5# leg weight in supine for elbow Extension x 5 minutes ( rest breaks as needed)    - 1# UBE in CW/CCW motion x 4 minutes to increase endurance and range of motion    - Active Assistive Range of Motion for elbow Extension and flexion  x 5 repetitions  - 0# pulleys for Flexion and elbow Extension  x 2 minutes   - 1# bicep curls for elbow Extension and flexion x 10 repetitions   - 0# dowel in supine for ER (towel roll) x 15 sec holds x 5 repetitions  -NP  - 0# dowel in standing for Extension and Internal Rotation x 10 repetitions  -NP  - YELLOW Theraband in standing for scap retraction x 20 repetitions   - Soccer ball for External Rotation / Internal Rotation and Supination  /Pronation  -NP  - YELLOW digiweb for composite digital Extension x 10 repetitions   - RED digiweb for intrinsics x 20 repetitions    - 0.5# dowel for Supination /Pronation stretch  x 10 repetitions     - RED Theraputty for pulling x 10 repetitions         Therapeutic Activities  to improve functional performance while increasing independence with ADLs and IADLs  x     12 minutes:    - LARGE dowel board for elbow Extension /Flexion and Supination /Pronation   - 25# Progressive Hand Gripper (black spring) for composite  x 20 repetitions  - RED digiflex for isolated x 10 repetitions;  GREEN digiflex for composite digital flexion x 20  "repetitions    - Wooden pegboard with GREEN clothespins with 3PT pinch x 2 repetitions  - RED Theraputty for PVC for "cookie cutting" and medicine top x 20  Repetitions         -NP = Not Performed     Initiate in future therapy sessions:    - Internal Rotation pulleys          - Biceps stretch on wall with Elbow Extension x 15 second holds x 5 repetitions  - ### Theraband with Wrist wheel for Supination / Pronation x 10 repetitions     - ### in side lying for External Rotation ( towel) x 10 repetitions   -  0# in supine for External Rotation( @ side); Internal Rotation /External Rotation   (@90* shoulder Abduction) (towel roll) x 10 repetitions  - Isometrics with   ####   Theraband for External Rotation and Internal Rotation x 10 repetitions  -  ### Theraband for shoulder Flexion, Extension, External Rotation and Internal Rotation x ###  repetitions     - ### Flexbar for Supination /Pronation  and Dorsiflexion /Volar Flexion x 20 repetitions    - ### Wrist exerstick in standing for Dorsiflexion / Volar Flexion  x 3 repetitions           Assessment     Patient will continue to benefit from skilled Occupational Therapy intervention to increase functional abilities, range of motion, and strength and pain control.  Patient. demonstrated proper understanding of each exercise.  Patient continues to require verbal and tactile cues for throughout therapy session to maintain position and prevent compensation.   Patient continues to be limited in functional and leisurely pursuits. Pain limits patient's participation in activities of daily living.  Patient is not able to carryout necessary vocational tasks.   Patient's spiritual, cultural and educational needs considered and patient agreeable to plan of care and goals.  Patient is making good progress towards established goals.  Patient tolerated exercises / activities within pain tolerance.   Reviewed Home Exercise Program with patient., see EPIC under "patient instructions" " for provided exercises, activity modifications and limitations, modalities for home pain management.  Patient. demo understanding of above.    Patient tolerated addition of ergometer; increase in resistance and repetitions during digiweb for intrinsics with no reported pain.   Patient tolerated increase in resistance during low load prolonged stretch with tolerable stretching pain reported.     Active range of motion: pre session (Right)   Elbow Extension / flexion: 15 (+5) / 119   Supination / Pronation: 75 / 90    Patient demo increased Active range of motion for (Right) elbow Extension pre session.      New/Revised Goals: Continue Plan Of Care   Goals:   Short Term (4 weeks on 9/4/2023):  1)   Patient to be IND with HEP and modalities for pain management.  (MET)   2) Patient will be independent with dressing and wiping herself in the bathroom.   (In Progress)   3)   Increase AROM 15 degrees for shoulder flexion and abduction  to increase functional hand use for overhead reaching activities.  (In Progress)   4)   Decrease edema .2-.3 cm to increase joint mobility /flexibility for improved overall functional hand use.  (In Progress)   5)   Increase AROM of elbow extension and flexion 10 to 15 to increase functional hand use for ADLs/work/leisure activities.   (In Progress)   6) Decrease pain to 3/10 at worst in her RUE.   (In Progress)      Long Term (by discharge):  1)   Pt will report 1 out of 10 pain with right arm use. (In Progress)   2)   Pt will return to prior level of function for ADLs and household management.  (In Progress)   3) Pt will return to work with no restrictions.  ( In Progress)     Plan      Continue 2x week during the 90 day certification period  08/04/2023  to 10/04/2023   with established Plan Of Care in pursuit of Occupational Therapy goals.      Kiesha Lemus, OT

## 2023-08-24 ENCOUNTER — CLINICAL SUPPORT (OUTPATIENT)
Dept: REHABILITATION | Facility: HOSPITAL | Age: 33
End: 2023-08-24
Payer: COMMERCIAL

## 2023-08-24 DIAGNOSIS — M25.521 PAIN IN JOINT OF RIGHT ELBOW: Primary | ICD-10-CM

## 2023-08-24 DIAGNOSIS — M25.621 DECREASED RANGE OF MOTION OF ELBOW, RIGHT: ICD-10-CM

## 2023-08-24 PROCEDURE — 97140 MANUAL THERAPY 1/> REGIONS: CPT | Mod: PN

## 2023-08-24 PROCEDURE — 97530 THERAPEUTIC ACTIVITIES: CPT | Mod: PN

## 2023-08-24 PROCEDURE — 97110 THERAPEUTIC EXERCISES: CPT | Mod: PN

## 2023-08-24 NOTE — PROGRESS NOTES
"                                Occupational Therapy Daily Treatment Note       Date: 8/29/2023  Name: Petty Donato  Clinic Number: [id number]    Therapy Diagnosis: S42.491D (ICD-10-CM) - Other closed displaced fracture of distal end of right humerus with routine healing, Z98.890,Z87.81 (ICD-10-CM) - S/P ORIF (open reduction internal fixation) fracture  Encounter Diagnosis   Name Primary?    Pain in joint of right elbow Yes     Physician: Shayla Bateman FNP    Physician Orders: S42.491D (ICD-10-CM) - Other closed displaced fracture of distal end of right humerus with routine healing, Z98.890,Z87.81 (ICD-10-CM) - S/P ORIF (open reduction internal fixation) fracture; evaluate and treat;  Begin PWB 25% x 2 weeks, then 50% x 2 weeks, then WBAT x 2 weeks  Medical Diagnosis: S42.491D (ICD-10-CM) - Other closed displaced fracture of distal end of right humerus with routine healing, Z98.890,Z87.81 (ICD-10-CM) - S/P ORIF (open reduction internal fixation) fracture  Surgical Procedure and Date:  s/p ORIF (Right) distal humeral shaft fracture; 6/23/2023    Insurance Authorization Period Expiration: 08/04/2023-12/31/2023  Plan of Care Certification Period: 08/04/2023-10/04/2023  Date of Return to MD: 11/02/2023    Evaluation FOTO: 08/04/2023 = NA    Visit # / Visits authorized: 4 / 20   Time In:  7:40  Time Out: 8:20   Total Billable Time: 40  minutes    Precautions:  Standard  ;  Per 08/03/2023 follow up= Begin PWB 25% x 2 weeks, then 50% x 2 weeks (08/17/2023) , then WBAT x 2 weeks (08/31/2023)       Post Op:  06/23/2023 = 9 weeks, 4 days      Subjective     Patient reports: "I have been working a lot lately and the front part of my shoulder is bothering me.  I am having difficulty reaching behind myself."    Pain: 2/10   Location of pain: (Right) elbow     Objective    Patient seen by Occupational Therapy this session. Tx consisted of:        Manual therapy techniques to increase joint mobilization    x     8 minutes: " "      -Manual Therapy techniques to  (Right) elbow  including stretching, and Passive Range of Motion to increase joint mobility, range of motion  and for pain management  x  5 minutes     -Soft Tissue Massage to distal biceps tendon and extensor muscle belly to relax muscle and increase range of motion and functional abilities  x  3  minutes         Therapeutic  Exercises to improve functional performance while increasing strength, endurance, range of motion,  and flexibility  x     20 minutes:    - Low load prolonged stretch with Moist Heat (on towel roll) with 0.5# leg weight in supine for elbow Extension x 5 minutes ( rest breaks as needed)    - 1# UBE in CW/CCW motion x 4 minutes to increase endurance and range of motion    - Active Assistive Range of Motion for elbow Extension and flexion  x 5 repetitions  - 0# pulleys for Flexion and elbow Extension  x 2 minutes   - 1# bicep curls for elbow Extension and flexion x 10 repetitions    - Internal Rotation towel stretch x 15 second holds x 3 repetitions    - "Doorway Stretch" x 15 second holds x 3 repetitions   - 0# dowel in standing for Extension and Internal Rotation x 10 repetitions  -NP  - YELLOW Theraband in standing for scap retraction x 20 repetitions -NP  - YELLOW digiweb for composite digital Extension x 10 repetitions   - RED digiweb for intrinsics x 20 repetitions    - 0.5# dowel for Supination /Pronation stretch  x 10 repetitions     - RED Theraputty for pulling x 10 repetitions   - YELLOW Theraband in standing for Tricep press x 10 repetitions           Therapeutic Activities  to improve functional performance while increasing independence with ADLs and IADLs  x     12 minutes:    - LARGE dowel board for elbow Extension /Flexion and Supination /Pronation   - 25# Progressive Hand Gripper (black spring) for composite  x 20 repetitions  - RED digiflex for isolated x 10 repetitions;  GREEN digiflex for composite digital flexion x 20 repetitions    - " "Wooden pegboard with GREEN clothespins with 3PT pinch x 2 repetitions  - RED Theraputty for PVC for "cookie cutting" and medicine top x 20  Repetitions           -NP = Not Performed     Initiate in future therapy sessions:        - Internal Rotation pulleys       - Biceps stretch on wall with Elbow Extension x 15 second holds x 5 repetitions  - ### Theraband with Wrist wheel for Supination / Pronation x 10 repetitions     - ### in side lying for External Rotation ( towel) x 10 repetitions   -  0# in supine for External Rotation( @ side); Internal Rotation /External Rotation   (@90* shoulder Abduction) (towel roll) x 10 repetitions  - Isometrics with   ####   Theraband for External Rotation and Internal Rotation x 10 repetitions  -  ### Theraband for shoulder Flexion, Extension, External Rotation and Internal Rotation x ###  repetitions     - ### Flexbar for Supination /Pronation  and Dorsiflexion /Volar Flexion x 20 repetitions    - ### Wrist exerstick in standing for Dorsiflexion / Volar Flexion  x 3 repetitions           Assessment     Patient will continue to benefit from skilled Occupational Therapy intervention to increase functional abilities, range of motion, and strength and pain control.  Patient. demonstrated proper understanding of each exercise.  Patient continues to require verbal and tactile cues for throughout therapy session to maintain position and prevent compensation.   Patient continues to be limited in functional and leisurely pursuits. Pain limits patient's participation in activities of daily living.  Patient is not able to carryout necessary vocational tasks.   Patient's spiritual, cultural and educational needs considered and patient agreeable to plan of care and goals.  Patient is making good progress towards established goals.  Patient tolerated exercises / activities within pain tolerance.   Reviewed Home Exercise Program with patient., see EPIC under "patient instructions" for provided " "exercises, activity modifications and limitations, modalities for home pain management.  Patient. demo understanding of above.    Patient tolerated addition of Theraband in standing for tricep press with no reported pain. Patient tolerated addition of "Doorway stretch";  Internal Rotation towel stretches with tolerable stretching pain reported.       Active range of motion: pre session (Right)   Elbow Extension / flexion: 10 (+5) / 119   Supination / Pronation: 75 / 90    Patient demo increased Active range of motion for (Right) elbow Extension pre session.       New/Revised Goals: Continue Plan Of Care   Goals:   Short Term (4 weeks on 9/4/2023):  1)   Patient to be IND with HEP and modalities for pain management.  (MET)   2) Patient will be independent with dressing and wiping herself in the bathroom.   (In Progress)   3)   Increase AROM 15 degrees for shoulder flexion and abduction  to increase functional hand use for overhead reaching activities.  (In Progress)   4)   Decrease edema .2-.3 cm to increase joint mobility /flexibility for improved overall functional hand use.  (In Progress)   5)   Increase AROM of elbow extension and flexion 10 to 15 to increase functional hand use for ADLs/work/leisure activities.   (In Progress)   6) Decrease pain to 3/10 at worst in her RUE.   (In Progress)      Long Term (by discharge):  1)   Pt will report 1 out of 10 pain with right arm use. (In Progress)   2)   Pt will return to prior level of function for ADLs and household management.  (In Progress)   3) Pt will return to work with no restrictions.  ( In Progress)     Plan      Continue 2x week during the 90 day certification period  08/04/2023  to 10/04/2023   with established Plan Of Care in pursuit of Occupational Therapy goals.      Kiesha Lemus OT      "

## 2023-08-29 ENCOUNTER — CLINICAL SUPPORT (OUTPATIENT)
Dept: REHABILITATION | Facility: HOSPITAL | Age: 33
End: 2023-08-29
Payer: COMMERCIAL

## 2023-08-29 DIAGNOSIS — M25.521 PAIN IN JOINT OF RIGHT ELBOW: Primary | ICD-10-CM

## 2023-08-29 DIAGNOSIS — M25.621 DECREASED RANGE OF MOTION OF ELBOW, RIGHT: ICD-10-CM

## 2023-08-29 PROCEDURE — 97140 MANUAL THERAPY 1/> REGIONS: CPT | Mod: PN

## 2023-08-29 PROCEDURE — 97530 THERAPEUTIC ACTIVITIES: CPT | Mod: PN

## 2023-08-29 PROCEDURE — 97110 THERAPEUTIC EXERCISES: CPT | Mod: PN

## 2023-08-30 ENCOUNTER — OFFICE VISIT (OUTPATIENT)
Dept: URGENT CARE | Facility: CLINIC | Age: 33
End: 2023-08-30
Payer: COMMERCIAL

## 2023-08-30 VITALS
HEART RATE: 88 BPM | RESPIRATION RATE: 20 BRPM | DIASTOLIC BLOOD PRESSURE: 84 MMHG | BODY MASS INDEX: 47.09 KG/M2 | OXYGEN SATURATION: 99 % | WEIGHT: 293 LBS | SYSTOLIC BLOOD PRESSURE: 140 MMHG | TEMPERATURE: 97 F | HEIGHT: 66 IN

## 2023-08-30 DIAGNOSIS — J06.9 VIRAL URI WITH COUGH: Primary | ICD-10-CM

## 2023-08-30 DIAGNOSIS — Z20.822 COVID-19 VIRUS NOT DETECTED: ICD-10-CM

## 2023-08-30 PROCEDURE — 99213 OFFICE O/P EST LOW 20 MIN: CPT | Mod: S$GLB,,, | Performed by: NURSE PRACTITIONER

## 2023-08-30 PROCEDURE — 99213 PR OFFICE/OUTPT VISIT, EST, LEVL III, 20-29 MIN: ICD-10-PCS | Mod: S$GLB,,, | Performed by: NURSE PRACTITIONER

## 2023-08-30 RX ORDER — PHENTERMINE HYDROCHLORIDE 37.5 MG/1
37.5 CAPSULE ORAL EVERY MORNING
COMMUNITY
End: 2023-11-16

## 2023-08-30 RX ORDER — PROMETHAZINE HYDROCHLORIDE AND DEXTROMETHORPHAN HYDROBROMIDE 6.25; 15 MG/5ML; MG/5ML
5 SYRUP ORAL NIGHTLY PRN
Qty: 50 ML | Refills: 0 | Status: SHIPPED | OUTPATIENT
Start: 2023-08-30 | End: 2023-09-06

## 2023-08-30 RX ORDER — BROMPHENIRAMINE MALEATE, PSEUDOEPHEDRINE HYDROCHLORIDE, AND DEXTROMETHORPHAN HYDROBROMIDE 2; 30; 10 MG/5ML; MG/5ML; MG/5ML
10 SYRUP ORAL EVERY 4 HOURS PRN
Qty: 118 ML | Refills: 0 | Status: SHIPPED | OUTPATIENT
Start: 2023-08-30 | End: 2023-09-09

## 2023-08-30 RX ORDER — AZELASTINE 1 MG/ML
1 SPRAY, METERED NASAL 2 TIMES DAILY PRN
Qty: 30 ML | Refills: 0 | Status: SHIPPED | OUTPATIENT
Start: 2023-08-30 | End: 2023-11-16

## 2023-08-30 RX ORDER — GUAIFENESIN 200 MG/1
400 TABLET ORAL EVERY 4 HOURS PRN
Qty: 30 TABLET | Refills: 0 | Status: SHIPPED | OUTPATIENT
Start: 2023-08-30 | End: 2023-09-06

## 2023-08-30 NOTE — PATIENT INSTRUCTIONS
Increase clear fluid intake  Stop all current over the counter cough, cold, flu medicine  Tylenol/motrin otc for fever or pain  Use Astelin nasal spray and Bromfed syrup for sinus congestion and pressure.  Take Mucinex   as needed for chest congestion and coughing. Take mucinex with a full glass of water at each dose  May use promethazine DM at bedtime for excessive nighttime coughing  Add a humidifier to your room at bedtime for respiratory comfort.  Saltwater gargles 4 x daily and benzocaine anesthetic throat lozenges for sore throat. May also add honey based cough syrup  Follow up with PCP  Go immediately to the nearest emergency room for shortness of breath, chest pain,  or other emergent concern.  Return to clinic for new, worse, or unresolving symptoms

## 2023-08-30 NOTE — LETTER
August 30, 2023      [facility]  [address]  [facility] [address]  Phone: [phone]       Patient: Petty Donato   YOB: 1990  Date of Visit: 08/30/2023    To Whom It May Concern:    Cary Donato  was at [facility] on 08/30/2023. The patient may return to work/school on 09/03/2023 with no restrictions. If you have any questions or concerns, or if I can be of further assistance, please do not hesitate to contact me.    Sincerely,    Shiv Bartholomew Jr., FNP-C

## 2023-08-30 NOTE — PROGRESS NOTES
"                                Occupational Therapy Daily Treatment Note       Date: 9/5/2023  Name: Petty Donato  Clinic Number: [id number]    Therapy Diagnosis: S42.491D (ICD-10-CM) - Other closed displaced fracture of distal end of right humerus with routine healing, Z98.890,Z87.81 (ICD-10-CM) - S/P ORIF (open reduction internal fixation) fracture  Encounter Diagnosis   Name Primary?    Decreased range of motion of elbow, right Yes     Physician: Shayla Bateman FNP    Physician Orders: S42.491D (ICD-10-CM) - Other closed displaced fracture of distal end of right humerus with routine healing, Z98.890,Z87.81 (ICD-10-CM) - S/P ORIF (open reduction internal fixation) fracture; evaluate and treat;  Begin PWB 25% x 2 weeks, then 50% x 2 weeks, then WBAT x 2 weeks  Medical Diagnosis: S42.491D (ICD-10-CM) - Other closed displaced fracture of distal end of right humerus with routine healing, Z98.890,Z87.81 (ICD-10-CM) - S/P ORIF (open reduction internal fixation) fracture  Surgical Procedure and Date:  s/p ORIF (Right) distal humeral shaft fracture; 6/23/2023    Insurance Authorization Period Expiration: 08/04/2023-12/31/2023  Plan of Care Certification Period: 08/04/2023-10/04/2023  Date of Return to MD: 11/02/2023    Evaluation FOTO: 08/04/2023 = NA    Visit # / Visits authorized: 5 / 20   Time In:  7:45  Time Out: 8:25   Total Billable Time: 40  minutes    Precautions:  Standard  ;  Per 08/03/2023 follow up= Begin PWB 25% x 2 weeks, then 50% x 2 weeks (08/17/2023) , then WBAT x 2 weeks (08/31/2023)       Post Op:  06/23/2023 = 10 weeks, 4 days      Subjective     Patient reports: "[place]  I went ok but the most difficult thing was reaching to scoop the ice cream.  I had to cancel last week because I was sick."       Pain: 1/10   Location of pain: (Right) elbow     Objective    Patient seen by Occupational Therapy this session. Tx consisted of:        Manual therapy techniques to " "increase joint mobilization    x     8 minutes:       -Manual Therapy techniques to  (Right) elbow  including stretching, and Passive Range of Motion to increase joint mobility, range of motion  and for pain management  x  5 minutes     -Soft Tissue Massage to distal biceps tendon and extensor muscle belly to relax muscle and increase range of motion and functional abilities  x  3  minutes         Therapeutic  Exercises to improve functional performance while increasing strength, endurance, range of motion,  and flexibility  x     20 minutes:    - Low load prolonged stretch with Moist Heat (on towel roll) with 0.5# leg weight in supine for elbow Extension x 5 minutes ( rest breaks as needed)    - 1# UBE in CW/CCW motion x 4 minutes to increase endurance and range of motion    - Active Assistive Range of Motion for elbow Extension and flexion  x 5 repetitions  - 0# pulleys for Flexion and elbow Extension  x 2 minutes   - 2# bicep curls for elbow Extension and flexion x 20 repetitions   - Internal Rotation towel stretch x 15 second holds x 3 repetitions    - "Doorway Stretch" x 15 second holds x 3 repetitions   - 0# dowel in standing for Extension and Internal Rotation x 10 repetitions  -NP  - YELLOW Theraband in standing for scap retraction x 20 repetitions -NP  - YELLOW digiweb for composite digital Extension x 10 repetitions   - RED digiweb for intrinsics x 20 repetitions    - 0.5# dowel for Supination /Pronation stretch  x 10 repetitions     - RED Theraputty for pulling x 10 repetitions   - GREEN Theraband in standing for Tricep press x 10 repetitions           Therapeutic Activities  to improve functional performance while increasing independence with ADLs and IADLs  x     12 minutes:    - LARGE dowel board for elbow Extension /Flexion and Supination /Pronation   - 25# Progressive Hand Gripper (black spring) for composite  x 20 repetitions  - RED digiflex for isolated x 10 repetitions;  GREEN digiflex for " "composite digital flexion x 20 repetitions -NP   - Wooden pegboard with GREEN clothespins with 3PT pinch x 2 repetitions  - RED Theraputty for PVC for "cookie cutting" and medicine top x 20  Repetitions     - YELLOW Flexbar for Supination /Pronation  and Dorsiflexion /Volar Flexion x 10 repetitions        -NP = Not Performed     Initiate in future therapy sessions:        - Internal Rotation pulleys       - Biceps stretch on wall with Elbow Extension x 15 second holds x 5 repetitions  - ### Theraband with Wrist wheel for Supination / Pronation x 10 repetitions     - ### in side lying for External Rotation ( towel) x 10 repetitions   -  0# in supine for External Rotation( @ side); Internal Rotation /External Rotation   (@90* shoulder Abduction) (towel roll) x 10 repetitions  - Isometrics with   ####   Theraband for External Rotation and Internal Rotation x 10 repetitions  -  ### Theraband for shoulder Flexion, Extension, External Rotation and Internal Rotation x ###  repetitions    - ### Wrist exerstick in standing for Dorsiflexion / Volar Flexion  x 3 repetitions           Assessment     Patient will continue to benefit from skilled Occupational Therapy intervention to increase functional abilities, range of motion, and strength and pain control.  Patient. demonstrated proper understanding of each exercise.  Patient continues to require verbal and tactile cues for throughout therapy session to maintain position and prevent compensation.   Patient continues to be limited in functional and leisurely pursuits. Pain limits patient's participation in activities of daily living.  Patient is not able to carryout necessary vocational tasks.   Patient's spiritual, cultural and educational needs considered and patient agreeable to plan of care and goals.  Patient is making good progress towards established goals.  Patient tolerated exercises / activities within pain tolerance.   Reviewed Home Exercise Program with patient., " "see EPIC under "patient instructions" for provided exercises, activity modifications and limitations, modalities for home pain management.  Patient. demo understanding of above.    Patient tolerated increase in resistance and repetitions  during bicep curls;  increase in resistance during Theraband in standing for tricep press;  addition of Flexbar for Dorsal Flexion, Volar Flexion, Supination, Pronation with no reported pain.         Active range of motion: pre session (Right)   Elbow Extension / flexion: 9 (+1) / 119   Supination / Pronation: 75 / 90    Patient demo increased Active range of motion for (Right) elbow Extension pre session.       New/Revised Goals: Continue Plan Of Care   Goals:   Short Term (4 weeks on 9/4/2023):  1)   Patient to be IND with HEP and modalities for pain management.  (MET)   2) Patient will be independent with dressing and wiping herself in the bathroom.   (In Progress)   3)   Increase AROM 15 degrees for shoulder flexion and abduction  to increase functional hand use for overhead reaching activities.  (In Progress)   4)   Decrease edema .2-.3 cm to increase joint mobility /flexibility for improved overall functional hand use.  (In Progress)   5)   Increase AROM of elbow extension and flexion 10 to 15 to increase functional hand use for ADLs/work/leisure activities.   (In Progress)   6) Decrease pain to 3/10 at worst in her RUE.   (In Progress)      Long Term (by discharge):  1)   Pt will report 1 out of 10 pain with right arm use. (In Progress)   2)   Pt will return to prior level of function for ADLs and household management.  (In Progress)   3) Pt will return to work with no restrictions.  ( In Progress)     Plan      Continue 2x week during the 90 day certification period  08/04/2023  to 10/04/2023   with established Plan Of Care in pursuit of Occupational Therapy goals.      Kiesha Lemus, OT      "

## 2023-08-30 NOTE — PROGRESS NOTES
"Subjective:      Patient ID: Petty Donato is a 33 y.o. female.    Vitals:  height is 5' 6" (1.676 m) and weight is 175 kg (385 lb 12.8 oz) (abnormal). Her oral temperature is 97.4 °F (36.3 °C). Her blood pressure is 140/84 (abnormal) and her pulse is 88. Her respiration is 20 and oxygen saturation is 99%.     Chief Complaint: Cough    Pt states "Covid is going around at her job and wants to be tested."    Cough  This is a new problem. The current episode started yesterday. The problem has been gradually worsening. The cough is Productive of sputum. Associated symptoms include rhinorrhea. Pertinent negatives include no chest pain, chills, ear pain, fever, rash, sore throat or shortness of breath. Associated symptoms comments: Itchy throat  Diarrhea. Treatments tried: Mucinex. The treatment provided no relief.       Constitution: Negative for chills and fever.   HENT:  Positive for congestion and sinus pressure. Negative for ear pain and sore throat.    Neck: Negative for painful lymph nodes.   Cardiovascular:  Negative for chest pain, palpitations and sob on exertion.   Respiratory:  Positive for cough and sputum production. Negative for shortness of breath.    Gastrointestinal:  Negative for abdominal pain, nausea and vomiting.   Skin:  Negative for rash.   Neurological:  Negative for dizziness, light-headedness, passing out, disorientation and altered mental status.   Hematologic/Lymphatic: Negative for swollen lymph nodes.   Psychiatric/Behavioral:  Negative for altered mental status, disorientation and confusion.       Objective:     Physical Exam   Constitutional: She is oriented to person, place, and time. She appears well-developed. She is cooperative.  Non-toxic appearance. She does not appear ill. No distress.   HENT:   Head: Normocephalic and atraumatic.   Ears:   Right Ear: Hearing and external ear normal.   Left Ear: Hearing and external ear normal.   Nose: Mucosal edema, rhinorrhea and congestion " present. No purulent discharge or nasal deformity. No epistaxis. Right sinus exhibits no maxillary sinus tenderness and no frontal sinus tenderness. Left sinus exhibits no maxillary sinus tenderness and no frontal sinus tenderness.   Mouth/Throat: Uvula is midline and mucous membranes are normal. Mucous membranes are moist. No trismus in the jaw. Normal dentition. No uvula swelling. No oropharyngeal exudate, posterior oropharyngeal edema, posterior oropharyngeal erythema, tonsillar abscesses or cobblestoning. Tonsils are 1+ on the right. Tonsils are 1+ on the left. No tonsillar exudate. Oropharynx is clear.   Eyes: Conjunctivae and lids are normal. No scleral icterus.   Neck: Trachea normal and phonation normal. Neck supple. No edema present. No erythema present. No neck rigidity present.   Cardiovascular: Normal rate, regular rhythm, normal heart sounds and normal pulses.   Pulmonary/Chest: Effort normal and breath sounds normal. No respiratory distress. She has no decreased breath sounds. She has no rhonchi.   Abdominal: Normal appearance.   Musculoskeletal: Normal range of motion.         General: No deformity. Normal range of motion.   Lymphadenopathy:     She has no cervical adenopathy.   Neurological: no focal deficit. She is alert and oriented to person, place, and time. She exhibits normal muscle tone. Coordination normal.   Skin: Skin is warm, dry, intact, not diaphoretic and not pale. Capillary refill takes 2 to 3 seconds.   Psychiatric: Her speech is normal and behavior is normal. Judgment and thought content normal.   Nursing note and vitals reviewed.      Assessment:     1. Viral URI with cough    2. COVID-19 virus not detected        Plan:       Viral URI with cough  -     azelastine (ASTELIN) 137 mcg (0.1 %) nasal spray; 1 spray (137 mcg total) by Nasal route 2 (two) times daily as needed for Rhinitis.  Dispense: 30 mL; Refill: 0  -     brompheniramine-pseudoeph-DM (BROMFED DM) 2-30-10 mg/5 mL Syrp;  Take 10 mLs by mouth every 4 (four) hours as needed (sinus congestion/drainage).  Dispense: 118 mL; Refill: 0  -     benzocaine-menthoL 6-10 mg lozenge; Take 1 lozenge by mouth every 2 (two) hours as needed (Sore Throat).  Dispense: 18 tablet; Refill: 0  -     guaiFENesin 200 mg tablet; Take 2 tablets (400 mg total) by mouth every 4 (four) hours as needed for Congestion.  Dispense: 30 tablet; Refill: 0  -     promethazine-dextromethorphan (PROMETHAZINE-DM) 6.25-15 mg/5 mL Syrp; Take 5 mLs by mouth nightly as needed (cough). Take as needed for nighttime coughing  Dispense: 50 mL; Refill: 0    COVID-19 virus not detected      I have discussed the test results and physical exam findings with the patient. We discussed symptom monitoring, conservative care methods, medication use, and follow up orders. The patient verbalized understanding and agreement with the plan of care.

## 2023-09-05 ENCOUNTER — CLINICAL SUPPORT (OUTPATIENT)
Dept: REHABILITATION | Facility: HOSPITAL | Age: 33
End: 2023-09-05
Payer: COMMERCIAL

## 2023-09-05 DIAGNOSIS — M25.621 DECREASED RANGE OF MOTION OF ELBOW, RIGHT: Primary | ICD-10-CM

## 2023-09-05 DIAGNOSIS — M25.521 PAIN IN JOINT OF RIGHT ELBOW: ICD-10-CM

## 2023-09-05 PROCEDURE — 97530 THERAPEUTIC ACTIVITIES: CPT | Mod: PN

## 2023-09-05 PROCEDURE — 97110 THERAPEUTIC EXERCISES: CPT | Mod: PN

## 2023-09-05 PROCEDURE — 97140 MANUAL THERAPY 1/> REGIONS: CPT | Mod: PN

## 2023-09-05 NOTE — PROGRESS NOTES
"                                Occupational Therapy Daily Treatment Note       Date: 9/7/2023  Name: Petty Donato  Clinic Number: [id number]    Therapy Diagnosis: S42.491D (ICD-10-CM) - Other closed displaced fracture of distal end of right humerus with routine healing, Z98.890,Z87.81 (ICD-10-CM) - S/P ORIF (open reduction internal fixation) fracture  Encounter Diagnosis   Name Primary?    Decreased range of motion of elbow, right Yes     Physician: Shayla Bateman FNP    Physician Orders: S42.491D (ICD-10-CM) - Other closed displaced fracture of distal end of right humerus with routine healing, Z98.890,Z87.81 (ICD-10-CM) - S/P ORIF (open reduction internal fixation) fracture; evaluate and treat;  Begin PWB 25% x 2 weeks, then 50% x 2 weeks, then WBAT x 2 weeks  Medical Diagnosis: S42.491D (ICD-10-CM) - Other closed displaced fracture of distal end of right humerus with routine healing, Z98.890,Z87.81 (ICD-10-CM) - S/P ORIF (open reduction internal fixation) fracture  Surgical Procedure and Date:  s/p ORIF (Right) distal humeral shaft fracture; 6/23/2023    Insurance Authorization Period Expiration: 08/04/2023-12/31/2023  Plan of Care Certification Period: 08/04/2023-10/04/2023  Date of Return to MD: 11/02/2023    Evaluation FOTO: 08/04/2023 = NA    Visit # / Visits authorized: 6 / 20   Time In:  7:45  Time Out: 8:25   Total Billable Time: 40  minutes    Precautions:  Standard  ;  Per 08/03/2023 follow up= Begin PWB 25% x 2 weeks, then 50% x 2 weeks (08/17/2023) , then WBAT x 2 weeks (08/31/2023)       Post Op:  06/23/2023 = 10 weeks, 6 days      Subjective     Patient reports: "I am doing well today.  I did not have any problems at work last night.  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  I am doing better with driving and shifting my car."      Pain: 0/10   Location of pain: (Right) elbow     Objective    Patient seen by Occupational Therapy this session. Tx consisted of:        Manual therapy techniques to increase " "joint mobilization    x     8 minutes:       -Manual Therapy techniques to  (Right) elbow  including stretching, and Passive Range of Motion to increase joint mobility, range of motion  and for pain management  x  5 minutes     -Soft Tissue Massage to distal biceps tendon and extensor muscle belly to relax muscle and increase range of motion and functional abilities  x  3  minutes         Therapeutic  Exercises to improve functional performance while increasing strength, endurance, range of motion,  and flexibility  x     20 minutes:    - Low load prolonged stretch with Moist Heat (on towel roll) with 0.5# leg weight in supine for elbow Extension x 5 minutes ( rest breaks as needed)    - 1.5# UBE in CW/CCW motion x 4 minutes to increase endurance and range of motion    - Active Assistive Range of Motion for elbow Extension and flexion  x 5 repetitions  - 0# pulleys for Flexion and elbow Extension  x 2 minutes   - 0# pulleys for Internal Rotation x 2 minutes  - 2# bicep curls for elbow Extension and flexion x 20 repetitions   - Internal Rotation towel stretch x 15 second holds x 3 repetitions -NP   - "Doorway Stretch" x 15 second holds x 3 repetitions   -NP  - YELLOW Theraband in standing for scap retraction x 20 repetitions -NP  - YELLOW digiweb for composite digital Extension x 10 repetitions   - RED digiweb for intrinsics x 20 repetitions    - 0.5# dowel for Supination /Pronation stretch  x 10 repetitions     - RED Theraputty for pulling x 10 repetitions   - GREEN Theraband in standing for Tricep press x 10 repetitions           Therapeutic Activities  to improve functional performance while increasing independence with ADLs and IADLs  x     12 minutes:    - LARGE dowel board for elbow Extension /Flexion and Supination /Pronation   - 25# Progressive Hand Gripper (black spring) for composite  x 20 repetitions  - RED digiflex for isolated x 10 repetitions;  GREEN digiflex for composite digital flexion x 20 " "repetitions -NP   - Wooden pegboard with GREEN clothespins with 3PT pinch x 2 repetitions  - RED Theraputty for PVC for "cookie cutting" and medicine top x 20  Repetitions     - YELLOW Flexbar for Supination /Pronation  and Dorsiflexion /Volar Flexion x 10 repetitions        -NP = Not Performed     Initiate in future therapy sessions:        - Internal Rotation pulleys       - Biceps stretch on wall with Elbow Extension x 15 second holds x 5 repetitions  - ### Theraband with Wrist wheel for Supination / Pronation x 10 repetitions     - ### in side lying for External Rotation ( towel) x 10 repetitions   -  0# in supine for External Rotation( @ side); Internal Rotation /External Rotation   (@90* shoulder Abduction) (towel roll) x 10 repetitions  - Isometrics with   ####   Theraband for External Rotation and Internal Rotation x 10 repetitions  -  ### Theraband for shoulder Flexion, Extension, External Rotation and Internal Rotation x ###  repetitions    - ### Wrist exerstick in standing for Dorsiflexion / Volar Flexion  x 3 repetitions           Assessment     Patient will continue to benefit from skilled Occupational Therapy intervention to increase functional abilities, range of motion, and strength and pain control.  Patient. demonstrated proper understanding of each exercise.  Patient continues to require verbal and tactile cues for throughout therapy session to maintain position and prevent compensation.   Patient continues to be limited in functional and leisurely pursuits. Pain limits patient's participation in activities of daily living.  Patient is not able to carryout necessary vocational tasks.   Patient's spiritual, cultural and educational needs considered and patient agreeable to plan of care and goals.  Patient is making good progress towards established goals.  Patient tolerated exercises / activities within pain tolerance.   Reviewed Home Exercise Program with patient., see EPIC under "patient " "instructions" for provided exercises, activity modifications and limitations, modalities for home pain management.  Patient. demo understanding of above.    Patient tolerated increase in resistance during ergometer  with no reported pain.         Active range of motion: pre session (Right)     Elbow Extension / flexion: 8 (+1) / 129 (+10)    Supination / Pronation: 75 / 90    Patient demo increased Active range of motion for (Right) elbow Extension and flexion  pre session.        New/Revised Goals: Continue Plan Of Care   Goals:   Short Term (4 weeks on 9/4/2023):  1)   Patient to be IND with HEP and modalities for pain management.  (MET)   2) Patient will be independent with dressing and wiping herself in the bathroom.   (In Progress)   3)   Increase AROM 15 degrees for shoulder flexion and abduction  to increase functional hand use for overhead reaching activities.  (In Progress)   4)   Decrease edema .2-.3 cm to increase joint mobility /flexibility for improved overall functional hand use.  (In Progress)   5)   Increase AROM of elbow extension and flexion 10 to 15 to increase functional hand use for ADLs/work/leisure activities.   (In Progress)   6) Decrease pain to 3/10 at worst in her RUE.   (In Progress)      Long Term (by discharge):  1)   Pt will report 1 out of 10 pain with right arm use. (In Progress)   2)   Pt will return to prior level of function for ADLs and household management.  (In Progress)   3) Pt will return to work with no restrictions.  ( In Progress)     Plan      Continue 2x week during the 90 day certification period  08/04/2023  to 10/04/2023   with established Plan Of Care in pursuit of Occupational Therapy goals.      Kiesha Lemus, OT      "

## 2023-09-07 ENCOUNTER — CLINICAL SUPPORT (OUTPATIENT)
Dept: REHABILITATION | Facility: HOSPITAL | Age: 33
End: 2023-09-07
Payer: COMMERCIAL

## 2023-09-07 DIAGNOSIS — M25.521 PAIN IN JOINT OF RIGHT ELBOW: ICD-10-CM

## 2023-09-07 DIAGNOSIS — M25.621 DECREASED RANGE OF MOTION OF ELBOW, RIGHT: Primary | ICD-10-CM

## 2023-09-07 PROCEDURE — 97140 MANUAL THERAPY 1/> REGIONS: CPT | Mod: PN

## 2023-09-07 PROCEDURE — 97530 THERAPEUTIC ACTIVITIES: CPT | Mod: PN

## 2023-09-07 PROCEDURE — 97110 THERAPEUTIC EXERCISES: CPT | Mod: PN

## 2023-09-07 NOTE — PROGRESS NOTES
"                                Occupational Therapy Progress Note       Date: 9/12/2023  Name: Petty Donato  [facility] Number: [id number]    Therapy Diagnosis: S42.491D (ICD-10-CM) - Other closed displaced fracture of distal end of right humerus with routine healing, Z98.890,Z87.81 (ICD-10-CM) - S/P ORIF (open reduction internal fixation) fracture  Encounter Diagnosis   Name Primary?    Decreased range of motion of elbow, right Yes     Physician: Shayla Bateman FNP    Physician Orders: S42.491D (ICD-10-CM) - Other closed displaced fracture of distal end of right humerus with routine healing, Z98.890,Z87.81 (ICD-10-CM) - S/P ORIF (open reduction internal fixation) fracture; evaluate and treat;  Begin PWB 25% x 2 weeks, then 50% x 2 weeks, then WBAT x 2 weeks  Medical Diagnosis: S42.491D (ICD-10-CM) - Other closed displaced fracture of distal end of right humerus with routine healing, Z98.890,Z87.81 (ICD-10-CM) - S/P ORIF (open reduction internal fixation) fracture  Surgical Procedure and Date:  s/p ORIF (Right) distal humeral shaft fracture; 6/23/2023    Insurance Authorization Period Expiration: 08/04/2023-12/31/2023  Plan of Care Certification Period: 09/12/2023-12/012/2023  Date of Return to MD: 11/02/2023    Evaluation FOTO: 08/04/2023 = Not Applicable    Visit # / Visits authorized: 6 / 20   Time In:  7:40  Time Out: 8:25  Total Billable Time: 40  minutes    Precautions:  Standard  ;  Per 08/03/2023 follow up= Begin PWB 25% x 2 weeks, then 50% x 2 weeks (08/17/2023) , then WBAT x 2 weeks (08/31/2023)       Post Op:  06/23/2023 = 11 weeks, 4 days      Subjective     Patient reports: "I am feeling about the same in my elbow.  I still can not hook the bra but its getting better."     Pain: 2/10   Location of pain: (Right) elbow     Objective    Patient seen by Occupational Therapy this session. Tx consisted of:        Manual therapy techniques to increase joint mobilization    x     8 minutes:       -Manual Therapy " "techniques to  (Right) elbow  including stretching, and Passive Range of Motion to increase joint mobility, range of motion  and for pain management  x  5 minutes     -Soft Tissue Massage to distal biceps tendon and extensor muscle belly to relax muscle and increase range of motion and functional abilities  x  3  minutes         Therapeutic  Exercises to improve functional performance while increasing strength, endurance, range of motion,  and flexibility  x     20 minutes:    - Low load prolonged stretch with Moist Heat (on towel roll) with 0.5# leg weight in supine for elbow Extension x 5 minutes ( rest breaks as needed)    - 1.5# UBE in CW/CCW motion x 4 minutes to increase endurance and range of motion    - Active Assistive Range of Motion for elbow Extension and flexion  x 5 repetitions  - 0# pulleys for Flexion and elbow Extension  x 2 minutes   - 0# pulleys for Internal Rotation x 2 minutes  - 2# bicep curls for elbow Extension and flexion x 20 repetitions   - Internal Rotation towel stretch x 15 second holds x 3 repetitions -NP   - "Doorway Stretch" x 15 second holds x 3 repetitions   -NP  - YELLOW Theraband in standing for scap retraction x 20 repetitions -NP  - YELLOW digiweb for composite digital Extension x 10 repetitions   - RED digiweb for intrinsics x 20 repetitions    - 0.5# dowel for Supination /Pronation stretch  x 10 repetitions     - RED Theraputty for pulling x 10 repetitions   - GREEN Theraband in standing for Tricep press x 10 repetitions           Therapeutic Activities  to improve functional performance while increasing independence with ADLs and IADLs  x     12 minutes:    - LARGE dowel board for elbow Extension /Flexion and Supination /Pronation   - 25# Progressive Hand Gripper (black spring) for composite  x 20 repetitions  - RED digiflex for isolated x 10 repetitions;  GREEN digiflex for composite digital flexion x 20 repetitions -NP   - Wooden pegboard with GREEN clothespins with " "3PT pinch x 2 repetitions  - RED Theraputty for PVC for "cookie cutting" and medicine top x 20  Repetitions     - YELLOW Flexbar for Supination /Pronation  and Dorsiflexion /Volar Flexion x 10 repetitions        -NP = Not Performed     Initiate in future therapy sessions:         - Biceps stretch on wall with Elbow Extension x 15 second holds x 5 repetitions  - ### Theraband with Wrist wheel for Supination / Pronation x 10 repetitions     - ### in side lying for External Rotation ( towel) x 10 repetitions   -  0# in supine for External Rotation( @ side); Internal Rotation /External Rotation   (@90* shoulder Abduction) (towel roll) x 10 repetitions  - Isometrics with   ####   Theraband for External Rotation and Internal Rotation x 10 repetitions  -  ### Theraband for shoulder Flexion, Extension, External Rotation and Internal Rotation x ###  repetitions    - ### Wrist exerstick in standing for Dorsiflexion / Volar Flexion  x 3 repetitions     Patient reassessed as follows:     Edema:   centimeters (Right) / (Left)  2 inches above elbow: 39 (-6.0)  / 39  2 inches below elbow: 30.5 (-0.5)   / 30.5  Elbow Crease: 28.6 (-0.4)  / 28                                   Active Range of Motion: shoulder / elbow                        (Right)   //  (Left)  Shoulder Flexion: 150 (+10)  /   160  Shoulder Abduction: 130 (+40) /  139  External Rotation:  61 (+11)  /  75  Internal Rotation ( @ 90*): 70 /  70  Elbow Extension: 6  (+44)  / 0   Elbow Flexion: 130 (+15)   /  140  Supination: 75 (+5)  / 90  Pronation: 90 / 90        Manual Muscle Test:  Shoulder / Elbow          (Right)   Shoulder Flexion:  4/5  Shoulder Abduction:  3+/5  Shoulder External Rotation: 4/5  Shoulder Internal Rotation: 4/5  Elbow Flexion:      4/5  Elbow Extension:  3+/5       Strength: (JOHNNY Dynamometer in pounds),Position II  (Right): 30  (Left):  45    Pinch Strength: (Pinch Gauge in pounds)             (Right) /  (Left)  Lateral Pinch:  12  / 13  3 " "Point Pinch:  14 / 14  2 Point Pinch:  10 / 14    Assessment     Patient will continue to benefit from skilled Occupational Therapy intervention to increase functional abilities, range of motion, and strength and pain control.  Patient. demonstrated proper understanding of each exercise.  Patient continues to require verbal and tactile cues for throughout therapy session to maintain position and prevent compensation.   Patient continues to be limited in functional and leisurely pursuits. Pain limits patient's participation in activities of daily living.  Patient is not able to carryout necessary vocational tasks.   Patient's spiritual, cultural and educational needs considered and patient agreeable to plan of care and goals.  Patient is making good progress towards established goals.  Patient tolerated exercises / activities within pain tolerance.   Reviewed Home Exercise Program with patient., see EPIC under "patient instructions" for provided exercises, activity modifications and limitations, modalities for home pain management.  Patient. demo understanding of above.           Patient demo decreased edema in (Right) elbow 2Inches above and below and elbow crease;  increased Active range of motion for (Right) shoulder Flexion, Abduction, External Rotation, (Right) elbow Extension, flexion, Supination.      New/Revised Goals: Continue Plan Of Care     Goals:   Long Term (by discharge):  1)   Pt will report 0 out of 10 pain with right arm use by 6-8 weeks. (Revised 09/12/2023)   2)   Pt will return to prior level of function for ADLs and household management.  (In Progress)   3) Pt will return to work with no restrictions.  ( In Progress)   4)    Patient will Increase AROM of elbow extension and flexion 10 to 15 to increase functional hand use for ADLs/work/leisure activities.   (In Progress)   5) Patient will increase Manual Muscle Testing by a grade to assist with lifting boxes at work by 6-8 weeks. 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 ( Added 09/12/2023)   7) Patient will increase pinch strength by 1-3 lbs to assist with opening packages by 6-8 weeks.  ( Added 09/12/2023)   8) Patient will decrease edema .2-.3 cm to increase joint mobility /flexibility for improved overall functional hand use.  (MET)     Plan      Continue 2x week during the 90 day certification period  09/12/2023  to 12/12/2023   with established Plan Of Care in pursuit of Occupational Therapy goals.      Kiesha Lemus, OT

## 2023-09-12 ENCOUNTER — CLINICAL SUPPORT (OUTPATIENT)
Dept: REHABILITATION | Facility: HOSPITAL | Age: 33
End: 2023-09-12
Payer: COMMERCIAL

## 2023-09-12 DIAGNOSIS — M25.521 PAIN IN JOINT OF RIGHT ELBOW: ICD-10-CM

## 2023-09-12 DIAGNOSIS — M25.621 DECREASED RANGE OF MOTION OF ELBOW, RIGHT: Primary | ICD-10-CM

## 2023-09-12 PROCEDURE — 97140 MANUAL THERAPY 1/> REGIONS: CPT | Mod: PN

## 2023-09-12 PROCEDURE — 97530 THERAPEUTIC ACTIVITIES: CPT | Mod: PN

## 2023-09-12 PROCEDURE — 97110 THERAPEUTIC EXERCISES: CPT | Mod: PN

## 2023-09-12 NOTE — PROGRESS NOTES
"                                Occupational Therapy Daily Treatment  Note       Date: 9/14/2023  Name: Petty Donato  Clinic Number: [id number]    Therapy Diagnosis: S42.491D (ICD-10-CM) - Other closed displaced fracture of distal end of right humerus with routine healing, Z98.890,Z87.81 (ICD-10-CM) - S/P ORIF (open reduction internal fixation) fracture  Encounter Diagnosis   Name Primary?    Decreased range of motion of elbow, right Yes     Physician: Shayla Bateman FNP    Physician Orders: S42.491D (ICD-10-CM) - Other closed displaced fracture of distal end of right humerus with routine healing, Z98.890,Z87.81 (ICD-10-CM) - S/P ORIF (open reduction internal fixation) fracture; evaluate and treat;  Begin PWB 25% x 2 weeks, then 50% x 2 weeks, then WBAT x 2 weeks  Medical Diagnosis: S42.491D (ICD-10-CM) - Other closed displaced fracture of distal end of right humerus with routine healing, Z98.890,Z87.81 (ICD-10-CM) - S/P ORIF (open reduction internal fixation) fracture  Surgical Procedure and Date:  s/p ORIF (Right) distal humeral shaft fracture; 6/23/2023    Insurance Authorization Period Expiration: 08/04/2023-12/31/2023  Plan of Care Certification Period: 09/12/2023-12/012/2023  Date of Return to MD: 11/02/2023    Evaluation FOTO: 08/04/2023 = Not Applicable    Visit # / Visits authorized: 7 / 20   Time In:  7:35  Time Out: 8:15   Total Billable Time: 40  minutes    Precautions:  Standard  ;  Per 08/03/2023 follow up= Begin PWB 25% x 2 weeks, then 50% x 2 weeks (08/17/2023) , then WBAT x 2 weeks (08/31/2023)       Post Op:  06/23/2023 = 11 weeks, 6 days      Subjective     Patient reports: "I am not in any pain this morning.  I am still having trouble with doing my bra."     Pain: 0/10   Location of pain: (Right) elbow     Objective    Patient seen by Occupational Therapy this session. Tx consisted of:        Manual therapy techniques to increase joint mobilization    x     8 minutes:       -Manual Therapy " "techniques to  (Right) elbow  including stretching, and Passive Range of Motion to increase joint mobility, range of motion  and for pain management  x  5 minutes     -Soft Tissue Massage to distal biceps tendon and extensor muscle belly to relax muscle and increase range of motion and functional abilities  x  3  minutes         Therapeutic  Exercises to improve functional performance while increasing strength, endurance, range of motion,  and flexibility  x     22 minutes:    - Low load prolonged stretch with Moist Heat (on towel roll) with 0.5# leg weight in supine for elbow Extension x 5 minutes ( rest breaks as needed)    - 2.0# UBE in CW/CCW motion x 4 minutes to increase endurance and range of motion    - Active Assistive Range of Motion for elbow Extension and flexion  x 5 repetitions  -NP  - 0# pulleys for Flexion and elbow Extension  x 2 minutes   - 0# pulleys for Internal Rotation x 2 minutes  - 2# bicep curls for elbow Extension and flexion x 20 repetitions   - Internal Rotation towel stretch x 15 second holds x 3 repetitions -NP   - "Doorway Stretch" x 15 second holds x 3 repetitions   -NP  - YELLOW Theraband in standing for scap retraction x 20 repetitions -NP  - YELLOW digiweb for composite digital Extension x 15 repetitions   - RED digiweb for intrinsics x 20 repetitions    - 0.5# dowel for Supination /Pronation stretch  x 10 repetitions     - RED Theraputty for pulling x 10 repetitions   - GREEN Theraband in standing for Tricep press x 10 repetitions   - YELLOW Theraband with Wrist wheel for Supination / Pronation x 10 repetitions           Therapeutic Activities  to improve functional performance while increasing independence with ADLs and IADLs  x     10 minutes:    - LARGE dowel board for elbow Extension /Flexion and Supination /Pronation   - 25# Progressive Hand Gripper (black spring) for composite  x 20 repetitions  - RED digiflex for isolated x 10 repetitions;  GREEN digiflex for composite " "digital flexion x 20 repetitions -NP   - Wooden pegboard with GREEN clothespins with 3PT pinch x 2 repetitions  - GREEN Theraputty for PVC for "cookie cutting" and medicine top x 20  Repetitions     - YELLOW Flexbar for Supination /Pronation  and Dorsiflexion /Volar Flexion x 10 repetitions        -NP = Not Performed     Initiate in future therapy sessions:       - Biceps stretch on wall with Elbow Extension x 15 second holds x 5 repetitions    - ### in side lying for External Rotation ( towel) x 10 repetitions   -  0# in supine for External Rotation( @ side); Internal Rotation /External Rotation   (@90* shoulder Abduction) (towel roll) x 10 repetitions  - Isometrics with   ####   Theraband for External Rotation and Internal Rotation x 10 repetitions  -  ### Theraband for shoulder Flexion, Extension, External Rotation and Internal Rotation x ###  repetitions    - ### Wrist exerstick in standing for Dorsiflexion / Volar Flexion  x 3 repetitions         Assessment     Patient will continue to benefit from skilled Occupational Therapy intervention to increase functional abilities, range of motion, and strength and pain control.  Patient. demonstrated proper understanding of each exercise.  Patient continues to require verbal and tactile cues for throughout therapy session to maintain position and prevent compensation.   Patient continues to be limited in functional and leisurely pursuits. Pain limits patient's participation in activities of daily living.  Patient is not able to carryout necessary vocational tasks.   Patient's spiritual, cultural and educational needs considered and patient agreeable to plan of care and goals.  Patient is making good progress towards established goals.  Patient tolerated exercises / activities within pain tolerance.   Reviewed Home Exercise Program with patient., see EPIC under "patient instructions" for provided exercises, activity modifications and limitations, modalities for home " "pain management.  Patient. demo understanding of above.    Patient tolerated increase in repetitions during digiweb for composite digital Extension; increase in resistance during ergometer and Theraputty with PVC for "cookie cutting" and medicine top;  addition of Theraband with wrist wheel for Supination / Pronation  with no reported pain.      New/Revised Goals: Continue Plan Of Care   Goals:   Long Term (by discharge):  1)   Pt will report 0 out of 10 pain with right arm use by 6-8 weeks. (Revised 09/12/2023)   2)   Pt will return to prior level of function for ADLs and household management.  (In Progress)   3) Pt will return to work with no restrictions.  ( In Progress)   4)    Patient will Increase AROM of elbow extension and flexion 10 to 15 to increase functional hand use for ADLs/work/leisure activities.   (In Progress)   5) Patient will increase Manual Muscle Testing by a grade to assist with lifting boxes at work by 6-8 weeks. 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 ( Added 09/12/2023)   7) Patient will increase pinch strength by 1-3 lbs to assist with opening packages by 6-8 weeks.  ( Added 09/12/2023)   8) Patient will decrease edema .2-.3 cm to increase joint mobility /flexibility for improved overall functional hand use.  (MET)     Plan      Continue 2x week during the 90 day certification period  09/12/2023  to 12/12/2023   with established Plan Of Care in pursuit of Occupational Therapy goals.      Kiesha Lemus OT      "

## 2023-09-14 ENCOUNTER — CLINICAL SUPPORT (OUTPATIENT)
Dept: REHABILITATION | Facility: HOSPITAL | Age: 33
End: 2023-09-14
Payer: COMMERCIAL

## 2023-09-14 DIAGNOSIS — M25.521 PAIN IN JOINT OF RIGHT ELBOW: ICD-10-CM

## 2023-09-14 DIAGNOSIS — M25.621 DECREASED RANGE OF MOTION OF ELBOW, RIGHT: Primary | ICD-10-CM

## 2023-09-14 PROCEDURE — 97140 MANUAL THERAPY 1/> REGIONS: CPT | Mod: PN

## 2023-09-14 PROCEDURE — 97530 THERAPEUTIC ACTIVITIES: CPT | Mod: PN

## 2023-09-14 PROCEDURE — 97110 THERAPEUTIC EXERCISES: CPT | Mod: PN

## 2023-09-14 NOTE — PROGRESS NOTES
"                                Occupational Therapy Daily Treatment  Note       Date: 9/19/2023  Name: Petty Donato  Clinic Number: [id number]    Therapy Diagnosis: S42.491D (ICD-10-CM) - Other closed displaced fracture of distal end of right humerus with routine healing, Z98.890,Z87.81 (ICD-10-CM) - S/P ORIF (open reduction internal fixation) fracture  Encounter Diagnosis   Name Primary?    Decreased range of motion of elbow, right Yes     Physician: Shayla Bateman FNP    Physician Orders: S42.491D (ICD-10-CM) - Other closed displaced fracture of distal end of right humerus with routine healing, Z98.890,Z87.81 (ICD-10-CM) - S/P ORIF (open reduction internal fixation) fracture; evaluate and treat;  Begin PWB 25% x 2 weeks, then 50% x 2 weeks, then WBAT x 2 weeks  Medical Diagnosis: S42.491D (ICD-10-CM) - Other closed displaced fracture of distal end of right humerus with routine healing, Z98.890,Z87.81 (ICD-10-CM) - S/P ORIF (open reduction internal fixation) fracture  Surgical Procedure and Date:  s/p ORIF (Right) distal humeral shaft fracture; 6/23/2023    Insurance Authorization Period Expiration: 08/04/2023-12/31/2023  Plan of Care Certification Period: 09/12/2023-12/012/2023  Date of Return to MD: 11/02/2023    Evaluation FOTO: 08/04/2023 = Not Applicable    Visit # / Visits authorized: 8 / 20   Time In:  7:45  Time Out: 8:25    Total Billable Time: 40  minutes    Precautions:  Standard  ;  Per 08/03/2023 follow up= Begin PWB 25% x 2 weeks, then 50% x 2 weeks (08/17/2023) , then WBAT x 2 weeks (08/31/2023)       Post Op:  06/23/2023 = 12 weeks, 4 days      Subjective     Patient reports: "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 Tx consisted of:        Manual therapy techniques to increase joint mobilization    x     8 minutes:       -Manual Therapy techniques to  (Right) " "elbow  including stretching, and Passive Range of Motion to increase joint mobility, range of motion  and for pain management  x  5 minutes     -Soft Tissue Massage to distal biceps tendon and extensor muscle belly to relax muscle and increase range of motion and functional abilities  x  3  minutes         Therapeutic  Exercises to improve functional performance while increasing strength, endurance, range of motion,  and flexibility  x     22 minutes:    - Low load prolonged stretch with Moist Heat (on towel roll) with 0.5# leg weight in supine for elbow Extension x 5 minutes ( rest breaks as needed)    - 2.5# UBE in CW/CCW motion x 4 minutes to increase endurance and range of motion    - Active Assistive Range of Motion for elbow Extension and flexion  x 5 repetitions  -NP  - 0# pulleys for Flexion and elbow Extension  x 2 minutes   - 0# pulleys for Internal Rotation x 2 minutes  - 4# bicep curls for elbow Extension and flexion x 20 repetitions   - Internal Rotation towel stretch x 15 second holds x 3 repetitions -NP   - "Doorway Stretch" x 15 second holds x 3 repetitions   -NP  - YELLOW Theraband in standing for scap retraction x 20 repetitions -NP  - YELLOW digiweb for composite digital Extension x 15 repetitions   - RED digiweb for intrinsics x 20 repetitions    - 1.0# dowel for Supination /Pronation stretch  x 10 repetitions     - RED Theraputty for pulling x 10 repetitions   - GREEN Theraband in standing for Tricep press x 10 repetitions   - YELLOW Theraband with Wrist wheel for Supination / Pronation x 10 repetitions   - GREEN Theraband in standing for External Rotation and Internal Rotation x 20 repetitions           Therapeutic Activities  to improve functional performance while increasing independence with ADLs and IADLs  x     10 minutes:    - LARGE dowel board for elbow Extension /Flexion and Supination /Pronation   - 25# Progressive Hand Gripper (black spring) for composite  x 20 repetitions  - RED " "digiflex for isolated x 10 repetitions;  GREEN digiflex for composite digital flexion x 20 repetitions -NP   - Wooden pegboard with BLUE (1) & BLACK (1) clothespins with 3PT pinch x 2 repetitions  - GREEN Theraputty for PVC for "cookie cutting" and medicine top x 20  Repetitions     - GREEN Flexbar for Supination /Pronation  and Dorsiflexion /Volar Flexion x 10 repetitions        -NP = Not Performed     Initiate in future therapy sessions:       - Biceps stretch on wall with Elbow Extension x 15 second holds x 5 repetitions    - ### in side lying for External Rotation ( towel) x 10 repetitions   -  0# in supine for External Rotation( @ side); Internal Rotation /External Rotation   (@90* shoulder Abduction) (towel roll) x 10 repetitions  - Isometrics with   ####   Theraband for External Rotation and Internal Rotation x 10 repetitions  -  ### Theraband for shoulder Flexion, Extension, x ###  repetitions    - ### Wrist exerstick in standing for Dorsiflexion / Volar Flexion  x 3 repetitions         Assessment     Patient will continue to benefit from skilled Occupational Therapy intervention to increase functional abilities, range of motion, and strength and pain control.  Patient. demonstrated proper understanding of each exercise.  Patient continues to require verbal and tactile cues for throughout therapy session to maintain position and prevent compensation.   Patient continues to be limited in functional and leisurely pursuits. Pain limits patient's participation in activities of daily living.  Patient is not able to carryout necessary vocational tasks.   Patient's spiritual, cultural and educational needs considered and patient agreeable to plan of care and goals.  Patient is making good progress towards established goals.  Patient tolerated exercises / activities within pain tolerance.   Reviewed Home Exercise Program with patient., see EPIC under "patient instructions" for provided exercises, activity " modifications and limitations, modalities for home pain management.  Patient. demo understanding of above.    Patient tolerated increase in resistance during bicep curls for elbow Extension / flexion; clothespins with wooden pegboard for 3 Point Pinch,  ergometer, Flexbar for Supination /Pronation  and Dorsiflexion /Volar Flexion;  addition of Theraband in standing for External Rotation and Internal Rotation   with no reported pain.   Patient tolerated increase in resistance during dowel for Supination / Pronation stretch with tolerable stretching pain reported.      New/Revised Goals: Continue Plan Of Care   Goals:   Long Term (by discharge):  1)   Pt will report 0 out of 10 pain with right arm use by 6-8 weeks. (Revised 09/12/2023)   2)   Pt will return to prior level of function for ADLs and household management.  (In Progress)   3) Pt will return to work with no restrictions.  ( In Progress)   4)    Patient will Increase AROM of elbow extension and flexion 10 to 15 to increase functional hand use for ADLs/work/leisure activities.   (In Progress)   5) Patient will increase Manual Muscle Testing by a grade to assist with lifting boxes at work by 6-8 weeks. 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 ( Added 09/12/2023)   7) Patient will increase pinch strength by 1-3 lbs to assist with opening packages by 6-8 weeks.  ( Added 09/12/2023)   8) Patient will decrease edema .2-.3 cm to increase joint mobility /flexibility for improved overall functional hand use.  (MET)     Plan      Continue 2x week during the 90 day certification period  09/12/2023  to 12/12/2023   with established Plan Of Care in pursuit of Occupational Therapy goals.      Kiesha Lemus, OT

## 2023-09-19 ENCOUNTER — CLINICAL SUPPORT (OUTPATIENT)
Dept: REHABILITATION | Facility: HOSPITAL | Age: 33
End: 2023-09-19
Attending: NURSE PRACTITIONER
Payer: COMMERCIAL

## 2023-09-19 DIAGNOSIS — M25.521 PAIN IN JOINT OF RIGHT ELBOW: ICD-10-CM

## 2023-09-19 DIAGNOSIS — M25.621 DECREASED RANGE OF MOTION OF ELBOW, RIGHT: Primary | ICD-10-CM

## 2023-09-19 PROCEDURE — 97140 MANUAL THERAPY 1/> REGIONS: CPT | Mod: PN

## 2023-09-19 PROCEDURE — 97530 THERAPEUTIC ACTIVITIES: CPT | Mod: PN

## 2023-09-19 PROCEDURE — 97110 THERAPEUTIC EXERCISES: CPT | Mod: PN

## 2023-09-21 NOTE — PROGRESS NOTES
"                                Occupational Therapy Daily Treatment Note       Date: 9/26/2023  Name: Petty Donato  Clinic Number: [id number]    Therapy Diagnosis: S42.491D (ICD-10-CM) - Other closed displaced fracture of distal end of right humerus with routine healing, Z98.890,Z87.81 (ICD-10-CM) - S/P ORIF (open reduction internal fixation) fracture  Encounter Diagnosis   Name Primary?    Pain in joint of right elbow Yes     Physician: Shayla Bateman FNP    Physician Orders: S42.491D (ICD-10-CM) - Other closed displaced fracture of distal end of right humerus with routine healing, Z98.890,Z87.81 (ICD-10-CM) - S/P ORIF (open reduction internal fixation) fracture; evaluate and treat;  Begin PWB 25% x 2 weeks, then 50% x 2 weeks, then WBAT x 2 weeks  Medical Diagnosis: S42.491D (ICD-10-CM) - Other closed displaced fracture of distal end of right humerus with routine healing, Z98.890,Z87.81 (ICD-10-CM) - S/P ORIF (open reduction internal fixation) fracture  Surgical Procedure and Date:  s/p ORIF (Right) distal humeral shaft fracture; 6/23/2023    Insurance Authorization Period Expiration: 08/04/2023-12/31/2023  Plan of Care Certification Period: 09/12/2023-12/012/2023  Date of Return to MD: 11/02/2023    Evaluation FOTO: 08/04/2023 = Not Applicable      Visit # / Visits authorized: 9 / 20   Time In:  7:40   Time Out: 8:20      Total Billable Time: 40  minutes    Precautions:  Standard  ;  Per 08/03/2023 follow up= Begin PWB 25% x 2 weeks, then 50% x 2 weeks (08/17/2023) , then WBAT x 2 weeks (08/31/2023)       Post Op:  06/23/2023       Subjective     Patient reports: "I am not having any pain in my arm today.  I feel like I am doing good with everything."       Pain: 0/10   Location of pain: (Right) elbow     Objective    Patient seen by Occupational Therapy this session. Tx consisted of:        Manual therapy techniques to increase joint mobilization    x     8 minutes:       -Manual Therapy techniques to  (Right) " elbow  including stretching, and Passive Range of Motion to increase joint mobility, range of motion  and for pain management  x  5 minutes     -Soft Tissue Massage to distal biceps tendon and extensor muscle belly to relax muscle and increase range of motion and functional abilities  x  3  minutes         Therapeutic  Exercises to improve functional performance while increasing strength, endurance, range of motion,  and flexibility  x     22 minutes:    - Low load prolonged stretch with Moist Heat (on towel roll) with 0.5# leg weight in supine for elbow Extension x 5 minutes ( rest breaks as needed)    - 3.0# UBE in CW/CCW motion x 4 minutes to increase endurance and range of motion    - Active Assistive Range of Motion for elbow Extension and flexion  x 5 repetitions  -NP  - 0# pulleys for Flexion and elbow Extension  x 2 minutes -NP  - 0# pulleys for Internal Rotation x 2 minutes  - 4# bicep curls for elbow Extension and flexion x 20 repetitions  - YELLOW Theraband in standing for scap retraction x 20 repetitions -NP  - YELLOW digiweb for composite digital Extension x 15 repetitions   - 1.0# dowel for Supination /Pronation stretch  x 10 repetitions     - GREEN Theraputty for pulling x 5 repetitions   - 3# in supine for Tricep press x 20 repetitions    - YELLOW Theraband with Wrist wheel for Supination / Pronation x 10 repetitions   - GREEN Theraband in standing for External Rotation and Internal Rotation x 20 repetitions           Therapeutic Activities  to improve functional performance while increasing independence with ADLs and IADLs  x     10 minutes:    - LARGE dowel board for elbow Extension /Flexion and Supination /Pronation   - 35# Progressive Hand Gripper (black spring) for composite  x 20 repetitions  - RED digiflex for isolated x 10 repetitions;  GREEN digiflex for composite digital flexion x 20 repetitions -NP   - Wooden pegboard with  BLACK  clothespins with 3PT pinch x 2 repetitions  - GREEN  "Theraputty for PVC for "cookie cutting" and medicine top x 20  Repetitions     - GREEN Flexbar for Supination /Pronation  and Dorsiflexion /Volar Flexion x 10 repetitions        -NP = Not Performed     Initiate in future therapy sessions:      - ### in side lying for External Rotation ( towel) x 10 repetitions   -  ### Theraband for shoulder Flexion, Extension, x ###  repetitions    - ### Wrist exerstick in standing for Dorsiflexion / Volar Flexion  x 3 repetitions             Assessment     Patient will continue to benefit from skilled Occupational Therapy intervention to increase functional abilities, range of motion, and strength and pain control.  Patient. demonstrated proper understanding of each exercise.  Patient continues to require verbal and tactile cues for throughout therapy session to maintain position and prevent compensation.   Patient continues to be limited in functional and leisurely pursuits. Pain limits patient's participation in activities of daily living.  Patient is not able to carryout necessary vocational tasks.   Patient's spiritual, cultural and educational needs considered and patient agreeable to plan of care and goals.  Patient is making good progress towards established goals.  Patient tolerated exercises / activities within pain tolerance.   Reviewed Home Exercise Program with patient., see EPIC under "patient instructions" for provided exercises, activity modifications and limitations, modalities for home pain management.  Patient. demo understanding of above.    Patient tolerated increase in resistance during Theraputty for pulling ( but decrease repetitions), clothespins with wooden pegboard for 3 Point Pinch,  ergometer, Progressive Hand Gripper for composite ; addition of tricep press in supine with no reported pain.      New/Revised Goals: Continue Plan Of Care   Goals:   Long Term (by discharge):  1)   Pt will report 0 out of 10 pain with right arm use by 6-8 weeks. " (Revised 09/12/2023)   2)   Pt will return to prior level of function for ADLs and household management.  (In Progress)   3) Pt will return to work with no restrictions.  ( In Progress)   4)    Patient will Increase AROM of elbow extension and flexion 10 to 15 to increase functional hand use for ADLs/work/leisure activities.   (In Progress)   5) Patient will increase Manual Muscle Testing by a grade to assist with lifting boxes at work by 6-8 weeks. 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 ( Added 09/12/2023)   7) Patient will increase pinch strength by 1-3 lbs to assist with opening packages by 6-8 weeks.  ( Added 09/12/2023)   8) Patient will decrease edema .2-.3 cm to increase joint mobility /flexibility for improved overall functional hand use.  (MET)     Plan      Continue 2x week during the 90 day certification period  09/12/2023  to 12/12/2023   with established Plan Of Care in pursuit of Occupational Therapy goals.      Kiesha Lemus, OT

## 2023-09-26 ENCOUNTER — CLINICAL SUPPORT (OUTPATIENT)
Dept: REHABILITATION | Facility: HOSPITAL | Age: 33
End: 2023-09-26
Attending: NURSE PRACTITIONER
Payer: COMMERCIAL

## 2023-09-26 DIAGNOSIS — M25.621 DECREASED RANGE OF MOTION OF ELBOW, RIGHT: ICD-10-CM

## 2023-09-26 DIAGNOSIS — M25.521 PAIN IN JOINT OF RIGHT ELBOW: Primary | ICD-10-CM

## 2023-09-26 PROCEDURE — 97530 THERAPEUTIC ACTIVITIES: CPT | Mod: PN

## 2023-09-26 PROCEDURE — 97140 MANUAL THERAPY 1/> REGIONS: CPT | Mod: PN

## 2023-09-26 PROCEDURE — 97110 THERAPEUTIC EXERCISES: CPT | Mod: PN

## 2023-09-26 NOTE — PROGRESS NOTES
"                                Occupational Therapy Discharge Summary       Date: 9/28/2023  Name: Petty Donato  Clinic Number: [id number]    Therapy Diagnosis: S42.491D (ICD-10-CM) - Other closed displaced fracture of distal end of right humerus with routine healing, Z98.890,Z87.81 (ICD-10-CM) - S/P ORIF (open reduction internal fixation) fracture  Encounter Diagnosis   Name Primary?    Pain in joint of right elbow Yes     Physician: Shayla Bateman FNP    Physician Orders: S42.491D (ICD-10-CM) - Other closed displaced fracture of distal end of right humerus with routine healing, Z98.890,Z87.81 (ICD-10-CM) - S/P ORIF (open reduction internal fixation) fracture; evaluate and treat;  Begin PWB 25% x 2 weeks, then 50% x 2 weeks, then WBAT x 2 weeks  Medical Diagnosis: S42.491D (ICD-10-CM) - Other closed displaced fracture of distal end of right humerus with routine healing, Z98.890,Z87.81 (ICD-10-CM) - S/P ORIF (open reduction internal fixation) fracture  Surgical Procedure and Date:  s/p ORIF (Right) distal humeral shaft fracture; 6/23/2023    Insurance Authorization Period Expiration: 08/04/2023-12/31/2023  Plan of Care Certification Period: 09/12/2023-12/012/2023  Date of Return to MD: 11/02/2023    Evaluation FOTO: 08/04/2023 = Not Applicable      Visit # / Visits authorized: 10 / 20   Time In:  7:40  Time Out: 7:55     Total Billable Time: 15  minutes    Precautions:  Standard  ;  Per 08/03/2023 follow up= Begin PWB 25% x 2 weeks, then 50% x 2 weeks (08/17/2023) , then WBAT x 2 weeks (08/31/2023)       Post Op:  06/23/2023       Subjective     Patient reports: "I am not having any pain.  I think I can continue my exercises at home.  I am able to do all my work tasks and tasks at home.  I am still limiting my lifting until I get more comfortable with it."    Pain: 0/10   Location of pain: (Right) elbow     Objective    Patient seen by Occupational Therapy this session. Tx consisted of:        Therapeutic " "Activities  to reassess edema, functional abilities, range of motion, Manual Muscle Testing,  and pinch strength  x     15 minutes:        Patient reassessed as follows:     Edema:   centimeters (Right) / (Left)  2 inches above elbow: 39   / 39  2 inches below elbow: 30.5    / 30.5  Elbow Crease: 28.2 (-0.4)  / 28                                   Active Range of Motion: shoulder / elbow                        (Right)   //  (Left)  Shoulder Flexion: 155 (+5)  /   160  Shoulder Abduction: 140 (+10) /  139  External Rotation:  68 (+7)  /  75  Internal Rotation ( @ 90*): 70 /  70  Elbow Extension: 0 (+0)  / 0   Elbow Flexion: 136 (+6 )   /  140  Supination: 85 (+10)  / 90  Pronation: 90 / 90    Manual Muscle Test:  Shoulder / Elbow          (Right)   Shoulder Flexion:  5/5  (+1)   Shoulder Abduction:  5/5   (+2)   Shoulder External Rotation: 5/5  (+1)   Shoulder Internal Rotation: 5/5  (+1)   Elbow Flexion:      5/5  (+1)   Elbow Extension:  5/5  (+2)        Strength: (JOHNNY Dynamometer in pounds),Position II  (Right): 45 (+15)   (Left):  45    Pinch Strength: (Pinch Gauge in pounds)             (Right) /  (Left)  Lateral Pinch:  13 (+1)   / 13  3 Point Pinch:  15 (+1)   /  14  2 Point Pinch:  13 (+3)  / 14        Assessment      Patient to be discharged home 2* to reaching max benefit from skilled outpatient Occupational Therapy at this time.  Patient to continue Home Exercise Program to maintain range of motion, strength, functional abilities, and pain management.  Patient to follow up with doctor as needed.  Patient's spiritual, cultural and educational needs considered and patient agreeable to plan of care and goals.   Reviewed Home Exercise Program with patient., see EPIC under "patient instructions" for provided exercises, activity modifications and limitations, modalities for home pain management.  Patient. demo understanding of above.     Patient demo decreased edema in (Right) elbow crease;  increased " Active range of motion for (Right) shoulder Flexion, Abduction, External Rotation, (Right) elbow extension, flexion, and Supination;  increased  and Lateral Pinch, 3 Point Pinch, and 2 Point Pinch strength; increased Manual Muscle Testing for (Right) shoulder Flexion, Abduction, External Rotation, Internal Rotation, (Right) elbow Extension and flexion.  Patient met 8/8 established goals.  Patient reported decreased pain on average with activity.        Goals:   Long Term (by discharge):  1)   Pt will report 0 out of 10 pain with right arm use by 6-8 weeks. (MET)   2)   Pt will return to prior level of function for ADLs and household management.  (MET)   3) Pt will return to work with no restrictions.  (MET)   4)    Patient will Increase AROM of elbow extension and flexion 10 to 15 to increase functional hand use for ADLs/work/leisure activities.   (MET)   5) Patient will increase Manual Muscle Testing by a grade to assist with lifting boxes at work by 6-8 weeks. 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 ( MET)   7) Patient will increase pinch strength by 1-3 lbs to assist with opening packages by 6-8 weeks.  ( MET)   8) Patient will decrease edema .2-.3 cm to increase joint mobility /flexibility for improved overall functional hand use.  (MET)     Plan     Patient to be discharged home 2* to reaching max benefit from skilled outpatient Occupational Therapy at this time.   Patient to follow up with doctor as needed.         Kiesha Lemus, OT

## 2023-09-28 ENCOUNTER — CLINICAL SUPPORT (OUTPATIENT)
Dept: REHABILITATION | Facility: HOSPITAL | Age: 33
End: 2023-09-28
Payer: COMMERCIAL

## 2023-09-28 DIAGNOSIS — M25.621 DECREASED RANGE OF MOTION OF ELBOW, RIGHT: ICD-10-CM

## 2023-09-28 DIAGNOSIS — M25.521 PAIN IN JOINT OF RIGHT ELBOW: Primary | ICD-10-CM

## 2023-09-28 PROCEDURE — 97530 THERAPEUTIC ACTIVITIES: CPT | Mod: PN

## 2023-10-31 DIAGNOSIS — S42.491D OTHER CLOSED DISPLACED FRACTURE OF DISTAL END OF RIGHT HUMERUS WITH ROUTINE HEALING, SUBSEQUENT ENCOUNTER: Primary | ICD-10-CM

## 2023-11-02 ENCOUNTER — HOSPITAL ENCOUNTER (OUTPATIENT)
Dept: RADIOLOGY | Facility: HOSPITAL | Age: 33
Discharge: HOME | End: 2023-11-02
Attending: NURSE PRACTITIONER
Payer: COMMERCIAL

## 2023-11-02 ENCOUNTER — PATIENT MESSAGE (OUTPATIENT)
Dept: ORTHOPEDICS | Facility: CLINIC | Age: 33
End: 2023-11-02
Payer: COMMERCIAL

## 2023-11-02 ENCOUNTER — OFFICE VISIT (OUTPATIENT)
Dept: ORTHOPEDICS | Facility: CLINIC | Age: 33
End: 2023-11-02
Payer: COMMERCIAL

## 2023-11-02 VITALS — HEIGHT: 66 IN | BODY MASS INDEX: 47.09 KG/M2 | WEIGHT: 293 LBS

## 2023-11-02 DIAGNOSIS — Z98.890 S/P ORIF (OPEN REDUCTION INTERNAL FIXATION) FRACTURE: ICD-10-CM

## 2023-11-02 DIAGNOSIS — Z87.81 S/P ORIF (OPEN REDUCTION INTERNAL FIXATION) FRACTURE: ICD-10-CM

## 2023-11-02 DIAGNOSIS — S42.491D OTHER CLOSED DISPLACED FRACTURE OF DISTAL END OF RIGHT HUMERUS WITH ROUTINE HEALING, SUBSEQUENT ENCOUNTER: ICD-10-CM

## 2023-11-02 DIAGNOSIS — M54.50 LUMBAR BACK PAIN: Primary | ICD-10-CM

## 2023-11-02 DIAGNOSIS — S42.491D OTHER CLOSED DISPLACED FRACTURE OF DISTAL END OF RIGHT HUMERUS WITH ROUTINE HEALING, SUBSEQUENT ENCOUNTER: Primary | ICD-10-CM

## 2023-11-02 DIAGNOSIS — M54.50 LUMBAR SPINE PAIN: ICD-10-CM

## 2023-11-02 PROCEDURE — 99213 OFFICE O/P EST LOW 20 MIN: CPT | Mod: S$GLB,,, | Performed by: NURSE PRACTITIONER

## 2023-11-02 PROCEDURE — 99213 PR OFFICE/OUTPT VISIT, EST, LEVL III, 20-29 MIN: ICD-10-PCS | Mod: S$GLB,,, | Performed by: NURSE PRACTITIONER

## 2023-11-02 PROCEDURE — 73060 XR HUMERUS 2 VIEW RIGHT: ICD-10-PCS | Mod: 26,RT,, | Performed by: RADIOLOGY

## 2023-11-02 PROCEDURE — 99999 PR PBB SHADOW E&M-EST. PATIENT-LVL III: ICD-10-PCS | Mod: PBBFAC,,, | Performed by: NURSE PRACTITIONER

## 2023-11-02 PROCEDURE — 73060 X-RAY EXAM OF HUMERUS: CPT | Mod: 26,RT,, | Performed by: RADIOLOGY

## 2023-11-02 PROCEDURE — 73060 X-RAY EXAM OF HUMERUS: CPT | Mod: TC,PO,RT

## 2023-11-02 PROCEDURE — 99999 PR PBB SHADOW E&M-EST. PATIENT-LVL III: CPT | Mod: PBBFAC,,, | Performed by: NURSE PRACTITIONER

## 2023-11-02 RX ORDER — MELOXICAM 15 MG/1
15 TABLET ORAL DAILY
Qty: 30 TABLET | Refills: 2 | Status: SHIPPED | OUTPATIENT
Start: 2023-11-02 | End: 2023-11-10 | Stop reason: SDUPTHER

## 2023-11-02 NOTE — PROGRESS NOTES
Chief Complaint   Patient presents with    Right Upper Arm - Pain       HPI:   This is a 33 y.o. who returns to clinic today in follow-up ORIF right humerus done over 4 months ago. She is not having pain. She is here just for routine follow up. No numbness or tingling. No associated signs or symptoms.     Past Medical History:   Diagnosis Date    Other closed displaced fracture of distal end of right humerus, initial encounter      Past Surgical History:   Procedure Laterality Date    OPEN REDUCTION AND INTERNAL FIXATION (ORIF) OF FRACTURE OF DISTAL HUMERUS Right 6/23/2023    Procedure: ORIF, FRACTURE, HUMERUS, DISTAL;  Surgeon: Merlin Lemus MD;  Location: [facility];  Service: Orthopedics;  Laterality: Right;    WISDOM TOOTH EXTRACTION       Current Outpatient Medications on File Prior to Visit   Medication Sig Dispense Refill    acetaminophen (TYLENOL) 650 MG TbSR Take 650 mg by mouth every 8 (eight) hours.      azelastine (ASTELIN) 137 mcg (0.1 %) nasal spray 1 spray (137 mcg total) by Nasal route 2 (two) times daily as needed for Rhinitis. 30 mL 0    benzocaine-menthoL 6-10 mg lozenge Take 1 lozenge by mouth every 2 (two) hours as needed (Sore Throat). 18 tablet 0    doxycycline (VIBRAMYCIN) 100 MG Cap Take 1 capsule (100 mg total) by mouth 2 (two) times daily. (Patient not taking: Reported on 8/30/2023) 14 capsule 0    ketoconazole (NIZORAL) 2 % shampoo Apply 1 each topically twice a week. 100 mL 0    nystatin (MYCOSTATIN) powder Apply to affected area 3 times daily 300 g 0    phentermine 37.5 MG capsule Take 37.5 mg by mouth every morning.      valACYclovir (VALTREX) 1000 MG tablet Take 1 tablet (1,000 mg total) by mouth 2 (two) times daily. for 15 days 30 tablet 0     Current Facility-Administered Medications on File Prior to Visit   Medication Dose Route Frequency Provider Last Rate Last Admin    lactated ringers infusion   Intravenous Continuous Bee Redmond MD 20 mL/hr at 06/23/23 1016 Restarted at  06/23/23 1033    sodium chloride 0.9% flush 10 mL  10 mL Intravenous Q6H PRN Merlin Lemus MD         Review of patient's allergies indicates:  No Known Allergies  Family History   Problem Relation Age of Onset    No Known Problems Mother     Cancer Father         lung    Cerebral aneurysm Father      Social History     Socioeconomic History    Marital status: Single   Tobacco Use    Smoking status: Never    Smokeless tobacco: Never   Substance and Sexual Activity    Alcohol use: Not Currently    Drug use: Never    Sexual activity: Yes     Birth control/protection: None       Review of Systems:  Constitutional:  Denies fever or chills   Eyes:  Denies change in visual acuity   HENT:  Denies nasal congestion or sore throat   Respiratory:  Denies cough or shortness of breath   Cardiovascular:  Denies chest pain or edema   GI:  Denies abdominal pain, nausea, vomiting, bloody stools or diarrhea   :  Denies dysuria   Integument:  Denies rash   Neurologic:  Denies headache, focal weakness or sensory changes   Endocrine:  Denies polyuria or polydipsia   Lymphatic:  Denies swollen glands   Psychiatric:  Denies depression or anxiety     Physical Exam:   Constitutional:  Well developed, well nourished, no acute distress, non-toxic appearance   Integument:  Well hydrated  Neurologic:  Alert & oriented x 3  Psychiatric:  Speech and behavior appropriate      RUE     Incision healed. No erythema or fluctuance. Overall normal alignment. Mild point TTP about the fracture site. Decreased ROM due to stiffness. Compartments soft. Skin intact. NVI distally.     X-rays were performed today, personally reviewed by me and findings discussed with the patient.  3 views of the right humerus show routine right humeral ORIF changes demonstrating stable alignment.          Other closed displaced fracture of distal end of right humerus with routine healing, subsequent encounter    S/P ORIF (open reduction internal fixation)  fracture    Lumbar spine pain       She wants to know if she can restart arthritic medication that was prescribed for her by another NP. She let me know via [facility] that the medication name was meloxicam, and she requested referral to back and spine for her chronic lumbar spine pain.   Will order referral.   Informed her ok to restart meloxicam daily prn.    Rtc as needed.

## 2023-11-10 ENCOUNTER — OFFICE VISIT (OUTPATIENT)
Dept: NEUROSURGERY | Facility: CLINIC | Age: 33
End: 2023-11-10
Payer: COMMERCIAL

## 2023-11-10 VITALS
HEART RATE: 91 BPM | WEIGHT: 293 LBS | RESPIRATION RATE: 18 BRPM | HEIGHT: 66 IN | BODY MASS INDEX: 47.09 KG/M2 | DIASTOLIC BLOOD PRESSURE: 92 MMHG | SYSTOLIC BLOOD PRESSURE: 160 MMHG

## 2023-11-10 DIAGNOSIS — M54.16 LUMBAR RADICULOPATHY, CHRONIC: Primary | ICD-10-CM

## 2023-11-10 DIAGNOSIS — M54.50 LUMBAR BACK PAIN: ICD-10-CM

## 2023-11-10 PROCEDURE — 99203 PR OFFICE/OUTPT VISIT, NEW, LEVL III, 30-44 MIN: ICD-10-PCS | Mod: S$GLB,,, | Performed by: PHYSICIAN ASSISTANT

## 2023-11-10 PROCEDURE — 99203 OFFICE O/P NEW LOW 30 MIN: CPT | Mod: S$GLB,,, | Performed by: PHYSICIAN ASSISTANT

## 2023-11-10 RX ORDER — MELOXICAM 15 MG/1
15 TABLET ORAL DAILY
Qty: 30 TABLET | Refills: 2 | Status: SHIPPED | OUTPATIENT
Start: 2023-11-10 | End: 2024-02-08
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
## 2023-11-16 ENCOUNTER — OFFICE VISIT (OUTPATIENT)
Dept: FAMILY MEDICINE | Facility: CLINIC | Age: 33
End: 2023-11-16
Payer: COMMERCIAL

## 2023-11-16 ENCOUNTER — HOSPITAL ENCOUNTER (OUTPATIENT)
Dept: RADIOLOGY | Facility: HOSPITAL | Age: 33
Discharge: HOME | End: 2023-11-16
Attending: PHYSICIAN ASSISTANT
Payer: COMMERCIAL

## 2023-11-16 ENCOUNTER — LAB VISIT (OUTPATIENT)
Dept: LAB | Facility: HOSPITAL | Age: 33
End: 2023-11-16
Attending: STUDENT IN AN ORGANIZED HEALTH CARE EDUCATION/TRAINING PROGRAM
Payer: COMMERCIAL

## 2023-11-16 VITALS
SYSTOLIC BLOOD PRESSURE: 138 MMHG | WEIGHT: 293 LBS | BODY MASS INDEX: 47.09 KG/M2 | OXYGEN SATURATION: 99 % | TEMPERATURE: 98 F | DIASTOLIC BLOOD PRESSURE: 88 MMHG | HEIGHT: 66 IN | HEART RATE: 93 BPM

## 2023-11-16 DIAGNOSIS — Z76.89 ENCOUNTER TO ESTABLISH CARE: ICD-10-CM

## 2023-11-16 DIAGNOSIS — E66.01 CLASS 3 SEVERE OBESITY WITH BODY MASS INDEX (BMI) OF 60.0 TO 69.9 IN ADULT, UNSPECIFIED OBESITY TYPE, UNSPECIFIED WHETHER SERIOUS COMORBIDITY PRESENT: Primary | ICD-10-CM

## 2023-11-16 DIAGNOSIS — M54.16 LUMBAR RADICULOPATHY, CHRONIC: ICD-10-CM

## 2023-11-16 DIAGNOSIS — M54.16 LUMBAR RADICULOPATHY: ICD-10-CM

## 2023-11-16 DIAGNOSIS — M54.50 LUMBAR BACK PAIN: ICD-10-CM

## 2023-11-16 DIAGNOSIS — E66.01 CLASS 3 SEVERE OBESITY WITH BODY MASS INDEX (BMI) OF 60.0 TO 69.9 IN ADULT, UNSPECIFIED OBESITY TYPE, UNSPECIFIED WHETHER SERIOUS COMORBIDITY PRESENT: ICD-10-CM

## 2023-11-16 PROBLEM — E66.813 CLASS 3 SEVERE OBESITY WITH BODY MASS INDEX (BMI) OF 60.0 TO 69.9 IN ADULT: Status: ACTIVE | Noted: 2023-11-16

## 2023-11-16 LAB
ALBUMIN SERPL BCP-MCNC: 3.7 G/DL (ref 3.5–5.2)
ALP SERPL-CCNC: 66 U/L (ref 55–135)
ALT SERPL W/O P-5'-P-CCNC: 19 U/L (ref 10–44)
ANION GAP SERPL CALC-SCNC: 11 MMOL/L (ref 8–16)
AST SERPL-CCNC: 21 U/L (ref 10–40)
BASOPHILS # BLD AUTO: 0.03 K/UL (ref 0–0.2)
BASOPHILS NFR BLD: 0.4 % (ref 0–1.9)
BILIRUB SERPL-MCNC: 0.4 MG/DL (ref 0.1–1)
BUN SERPL-MCNC: 12 MG/DL (ref 6–20)
CALCIUM SERPL-MCNC: 9.4 MG/DL (ref 8.7–10.5)
CHLORIDE SERPL-SCNC: 106 MMOL/L (ref 95–110)
CHOLEST SERPL-MCNC: 149 MG/DL (ref 120–199)
CHOLEST/HDLC SERPL: 3.7 {RATIO} (ref 2–5)
CO2 SERPL-SCNC: 24 MMOL/L (ref 23–29)
CREAT SERPL-MCNC: 0.6 MG/DL (ref 0.5–1.4)
DIFFERENTIAL METHOD: ABNORMAL
EOSINOPHIL # BLD AUTO: 0.2 K/UL (ref 0–0.5)
EOSINOPHIL NFR BLD: 2.5 % (ref 0–8)
ERYTHROCYTE [DISTWIDTH] IN BLOOD BY AUTOMATED COUNT: 16 % (ref 11.5–14.5)
EST. GFR  (NO RACE VARIABLE): >60 ML/MIN/1.73 M^2
GLUCOSE SERPL-MCNC: 72 MG/DL (ref 70–110)
HCT VFR BLD AUTO: 39.8 % (ref 37–48.5)
HDLC SERPL-MCNC: 40 MG/DL (ref 40–75)
HDLC SERPL: 26.8 % (ref 20–50)
HGB BLD-MCNC: 12.4 G/DL (ref 12–16)
IMM GRANULOCYTES # BLD AUTO: 0.02 K/UL (ref 0–0.04)
IMM GRANULOCYTES NFR BLD AUTO: 0.3 % (ref 0–0.5)
LDLC SERPL CALC-MCNC: 97.2 MG/DL (ref 63–159)
LYMPHOCYTES # BLD AUTO: 1.9 K/UL (ref 1–4.8)
LYMPHOCYTES NFR BLD: 24.6 % (ref 18–48)
MCH RBC QN AUTO: 28.6 PG (ref 27–31)
MCHC RBC AUTO-ENTMCNC: 31.2 G/DL (ref 32–36)
MCV RBC AUTO: 92 FL (ref 82–98)
MONOCYTES # BLD AUTO: 0.6 K/UL (ref 0.3–1)
MONOCYTES NFR BLD: 8.3 % (ref 4–15)
NEUTROPHILS # BLD AUTO: 4.9 K/UL (ref 1.8–7.7)
NEUTROPHILS NFR BLD: 63.9 % (ref 38–73)
NONHDLC SERPL-MCNC: 109 MG/DL
NRBC BLD-RTO: 0 /100 WBC
PLATELET # BLD AUTO: 274 K/UL (ref 150–450)
PMV BLD AUTO: 12.4 FL (ref 9.2–12.9)
POTASSIUM SERPL-SCNC: 4 MMOL/L (ref 3.5–5.1)
PROT SERPL-MCNC: 7.7 G/DL (ref 6–8.4)
RBC # BLD AUTO: 4.34 M/UL (ref 4–5.4)
SODIUM SERPL-SCNC: 141 MMOL/L (ref 136–145)
TRIGL SERPL-MCNC: 59 MG/DL (ref 30–150)
TSH SERPL DL<=0.005 MIU/L-ACNC: 1.82 UIU/ML (ref 0.4–4)
WBC # BLD AUTO: 7.63 K/UL (ref 3.9–12.7)

## 2023-11-16 PROCEDURE — 99999 PR PBB SHADOW E&M-EST. PATIENT-LVL III: ICD-10-PCS | Mod: PBBFAC,,, | Performed by: STUDENT IN AN ORGANIZED HEALTH CARE EDUCATION/TRAINING PROGRAM

## 2023-11-16 PROCEDURE — 36415 COLL VENOUS BLD VENIPUNCTURE: CPT | Mod: PO | Performed by: STUDENT IN AN ORGANIZED HEALTH CARE EDUCATION/TRAINING PROGRAM

## 2023-11-16 PROCEDURE — 84443 ASSAY THYROID STIM HORMONE: CPT | Performed by: STUDENT IN AN ORGANIZED HEALTH CARE EDUCATION/TRAINING PROGRAM

## 2023-11-16 PROCEDURE — 85025 COMPLETE CBC W/AUTO DIFF WBC: CPT | Performed by: STUDENT IN AN ORGANIZED HEALTH CARE EDUCATION/TRAINING PROGRAM

## 2023-11-16 PROCEDURE — 80061 LIPID PANEL: CPT | Performed by: STUDENT IN AN ORGANIZED HEALTH CARE EDUCATION/TRAINING PROGRAM

## 2023-11-16 PROCEDURE — 99213 PR OFFICE/OUTPT VISIT, EST, LEVL III, 20-29 MIN: ICD-10-PCS | Mod: S$GLB,,, | Performed by: STUDENT IN AN ORGANIZED HEALTH CARE EDUCATION/TRAINING PROGRAM

## 2023-11-16 PROCEDURE — 99213 OFFICE O/P EST LOW 20 MIN: CPT | Mod: S$GLB,,, | Performed by: STUDENT IN AN ORGANIZED HEALTH CARE EDUCATION/TRAINING PROGRAM

## 2023-11-16 PROCEDURE — 99999 PR PBB SHADOW E&M-EST. PATIENT-LVL III: CPT | Mod: PBBFAC,,, | Performed by: STUDENT IN AN ORGANIZED HEALTH CARE EDUCATION/TRAINING PROGRAM

## 2023-11-16 PROCEDURE — 72110 X-RAY EXAM L-2 SPINE 4/>VWS: CPT | Mod: TC,PO

## 2023-11-16 PROCEDURE — 80053 COMPREHEN METABOLIC PANEL: CPT | Performed by: STUDENT IN AN ORGANIZED HEALTH CARE EDUCATION/TRAINING PROGRAM

## 2023-11-16 NOTE — PROGRESS NOTES
[facility] Note    Subjective:  Chief Complaint:   Chief Complaint   Patient presents with    Establish Care     Lower back pain       History of Present Illness:  Petty is a pleasant intelligent patient who is here for establishing care. No daily medications.     BMI 61.98  Last A1c 5.3 (6/23/23)    Chronic lumbar back pain  Follows with Neurosurgery    Recent arm fracture  Meloxicam 15  Plan for PT   Follows with Orthopedics    OBGYN for PAP   Recommend COVID vaccinations.     Screening  Health Maintenance         Date Due Completion Date    Lipid Panel Never done ---    TETANUS VACCINE Never done ---    COVID-19 Vaccine (2 - 2023-24 season) 09/01/2023 7/29/2021    Cervical Cancer Screening 12/13/2027 12/13/2022          Immunization History   Administered Date(s) Administered    COVID-19, MRNA, LN-S, PF (Pfizer) (Purple Cap) 07/29/2021     The ASCVD Risk score (Elizabeth CROSS, et al., 2019) failed to calculate for the following reasons:    The 2019 ASCVD risk score is only valid for ages 40 to 79    Allergies:  Review of patient's allergies indicates:  No Known Allergies    Home Medications:  Current Outpatient Medications on File Prior to Visit   Medication Sig    meloxicam (MOBIC) 15 MG tablet Take 1 tablet (15 mg total) by mouth once daily.    tirzepatide 5 mg/0.5 mL PnIj Inject 5 mg into the skin every 7 days.    valACYclovir (VALTREX) 1000 MG tablet Take 1 tablet (1,000 mg total) by mouth 2 (two) times daily. for 15 days    [DISCONTINUED] acetaminophen (TYLENOL) 650 MG TbSR Take 650 mg by mouth every 8 (eight) hours.    [DISCONTINUED] azelastine (ASTELIN) 137 mcg (0.1 %) nasal spray 1 spray (137 mcg total) by Nasal route 2 (two) times daily as needed for Rhinitis. (Patient not taking: Reported on 11/10/2023)    [DISCONTINUED] benzocaine-menthoL 6-10 mg lozenge Take 1 lozenge by mouth every 2 (two) hours as needed (Sore Throat). (Patient not taking: Reported on 11/10/2023)    [DISCONTINUED]  doxycycline (VIBRAMYCIN) 100 MG Cap Take 1 capsule (100 mg total) by mouth 2 (two) times daily. (Patient not taking: Reported on 8/30/2023)    [DISCONTINUED] ketoconazole (NIZORAL) 2 % shampoo Apply 1 each topically twice a week. (Patient not taking: Reported on 11/10/2023)    [DISCONTINUED] nystatin (MYCOSTATIN) powder Apply to affected area 3 times daily (Patient not taking: Reported on 11/10/2023)    [DISCONTINUED] phentermine 37.5 MG capsule Take 37.5 mg by mouth every morning.     Current Facility-Administered Medications on File Prior to Visit   Medication    lactated ringers infusion    [DISCONTINUED] sodium chloride 0.9% flush 10 mL       Past Medical History:   Diagnosis Date    Other closed displaced fracture of distal end of right humerus, initial encounter      Past Surgical History:   Procedure Laterality Date    OPEN REDUCTION AND INTERNAL FIXATION (ORIF) OF FRACTURE OF DISTAL HUMERUS Right 6/23/2023    Procedure: ORIF, FRACTURE, HUMERUS, DISTAL;  Surgeon: Merlin Lemus MD;  Location: [facility];  Service: Orthopedics;  Laterality: Right;    WISDOM TOOTH EXTRACTION       Family History   Problem Relation Age of Onset    No Known Problems Mother     Cancer Father         lung    Cerebral aneurysm Father      Social History     Tobacco Use    Smoking status: Never    Smokeless tobacco: Never   Substance Use Topics    Alcohol use: Not Currently    Drug use: Never            The patient's past medical history, surgical history, social history, family history, allergies and medications have been reviewed.    Review of Systems     10 point review of systems was conducted and only the pertinent positives and pertinent negatives are noted above in the HPI section.    Physical Examination  General appearance: alert, cooperative, no distress  HEENT: normocephalic, atraumatic, PERRLA, TMs visualized bilaterally not impacted by cerumen, no nasal septal deviation, oropharynx mucosa pink and moist without tonsillar  "exudates  Neck: trachea midline, no LAD, no thyromegaly, no neck stiffness  Lungs: clear to auscultation, no wheezes, rales or rhonchi, symmetric air entry  Heart: normal rate, regular rhythm, normal S1, S2, no murmurs, rubs, clicks or gallops  Abdomen: soft, nontender, nondistended, no rigidity, rebound, or guarding.   Skin: No rashes or abnormal skin lesions, no apparent jaundice or bruising  Extremities: Full ROM of all extremities, peripheral pulses normal, no unilateral leg swelling or calf tenderness   Neurological:alert, oriented, normal speech, no new focal findings or movement disorder noted from baseline      BP Readings from Last 3 Encounters:   11/16/23 138/88   11/10/23 (!) 160/92   09/27/23 130/78     Wt Readings from Last 3 Encounters:   11/16/23 (!) 172.5 kg (380 lb 4.7 oz)   11/10/23 (!) 174.2 kg (384 lb)   11/02/23 (!) 174.2 kg (384 lb 0.7 oz)     /88 (BP Location: Left arm, Patient Position: Sitting, BP Method: Large (Manual))   Pulse 93   Temp 98.3 °F (36.8 °C)   Ht 5' 6" (1.676 m)   Wt (!) 172.5 kg (380 lb 4.7 oz)   SpO2 99%   BMI 61.38 kg/m²       274}  Laboratory: I have reviewed old labs below:       274}    Lab Results   Component Value Date     06/23/2023    K 4.1 06/23/2023     06/23/2023    CALCIUM 8.4 06/23/2023    CO2 27 06/23/2023    GLU 97 06/23/2023    BUN 12 06/23/2023    CREATININE 0.58 06/23/2023    EGFRNORACEVR >60 06/23/2023    ANIONGAP 6 06/23/2023    HGBA1C 5.3 06/23/2023      Lab reviewed by me: Particular labs of significance that I will monitor, workup, or treat to improve are mentioned below in diagnostic impression remarks.    Imaging/EKG: I have reviewed the pertinent results and my findings are noted in remarks.     274}    CC:   Chief Complaint   Patient presents with    Establish Care     Lower back pain           274}    Assessment/Plan  Petty Donato is a 33 y.o. female who presents to clinic with:  1. Class 3 severe obesity with body mass " index (BMI) of 60.0 to 69.9 in adult, unspecified obesity type, unspecified whether serious comorbidity present    2. Encounter to establish care    3. Lumbar radiculopathy          274}  Diagnostic Impression Remarks       33 y.o. female who  has a past medical history of Other closed displaced fracture of distal end of right humerus, initial encounter. presents to clinic today for est care    This is the extent of this pleasant patient's concerns at this present time. She did not feel chest pain upon exertion, dyspnea, nausea, vomiting, diaphoresis, or syncope. No pleuritic chest pain, unilateral leg swelling, calf tenderness, or calf pain. Negative for unintentional weight loss night sweats, hematuria, and fevers. Petty will return to clinic in a few months for further workup and reassessment or sooner as needed. She was instructed to call the clinic or go to the emergency department or urgent care immediately if her symptoms do not improve, worsens, or if any new symptoms develop. As we discussed that symptoms could worsen over the next 24 hours she was advised that if any increased swelling, pain, or numbness arise to go immediately to the ED. Patient knows to call any time if an emergency arises. Shared decision making occurred and she verbalized understanding in agreement with this plan. I discussed imaging findings, diagnosis, possibilities, treatment options, medications, risks, and benefits. She had many questions regarding the options and long-term effects. All questions were answered. She expressed understanding after counseling regarding the diagnosis and recommendations. She was capable and demonstrated competence with understanding of these options. Shared decision making was performed resulting in her choosing the current treatment plan. Patient handout was given with instructions and recommendations. Advised the patient that if they become pregnant to alert us immediately to assess for medication  changes. Having a healthy weight can decrease the risk of 13 cancers and is an important goal. I also discussed the importance of close follow up to discuss labs, change or modify her medications if needed, monitor side effects, and further evaluation of medical problems.     Additional workup planned: see labs ordered below.    See below for labs and meds ordered with associated diagnosis      1. Encounter to establish care    2. Class 3 severe obesity with body mass index (BMI) of 60.0 to 69.9 in adult, unspecified obesity type, unspecified whether serious comorbidity present  - CBC Auto Differential; Future  - Comprehensive Metabolic Panel; Future  - Lipid Panel; Future  - TSH; Future    3. Lumbar radiculopathy  Follow with Orthopedics and Neurosurgery     I also recommend the following therapeutic lifestyle changes as an adjunct to medical therapy:     - Transition to a low salt, mediterranean-style diet which emphasizes:  Eating primarily plant-based foods, such as fruits and vegetables, whole grains, legumes and nuts   Replacing butter with healthy fats, such as olive oil   Using herbs and spices instead of salt to flavor foods   Limiting red meat to no more than a few times a month   Eating fish and poultry at least twice a week      - Patient counseled on benefits of regular exercise.  Initiation of a graded aerobic exercise plan (goal 30-45 minutes per day 4-5 times per week)  Patient encouraged to participate in low intensity strength exercising and if unfamiliar with strength and conditioning training, I recommend joining a gym with access to a  to further instruct the patient.      - If weight/obesity is a concern a reasonable weight loss goal of 1-2lbs per month to reach a goal of 5-10% weight loss in 5-6 months, or a BMI less than 25.      RTC annually or PRN     Vanesa William MD, [facility]   Family Medicine Physician  11/16/2023      About The Book  Improve all areas of your health  from your weight, sleep, cravings, mood, energy, skin, and even slow down aging, with easy-to-implement, science-based hacks to manage your blood sugar levels while still eating the foods you love.    Glucose, or blood sugar, is a tiny molecule in our body that has a huge impact on our health. It enters our bloodstream through the starchy or sweet foods we eat. Ninety percent of us suffer from too much glucose in our system--and most of us don't know it.    The symptoms? Cravings, fatigue, infertility, hormonal issues, acne, wrinkles And over time, the development of conditions like type 2 diabetes, polycystic ovarian syndrome, cancer, dementia, and heart disease.    Drawing on cutting-edge science and her own pioneering research,  Mandi Pérez offers ten simple, surprising hacks to help you balance your glucose levels and reverse your symptoms--without going on a diet or giving up the foods you love. For example:  * How eating foods in the right order will make you lose weight effortlessly  * What secret ingredient will allow you to eat dessert and still go into fat-burning mode  * What small change to your breakfast will unlock energy and cut your cravings    Both entertaining, informative, and packed with the latest scientific data, this book presents a new way to think about better health. Glucose Revolution is chock-full of tips that can drastically and immediately improve your life, whatever your dietary preferences.    About the author     Mandi Pérez is on a mission to translate cutting-edge science into easy tips to help people improve their physical and mental health. Shes the  of the wildly popular [employer] account [employer] where she teaches tens of thousands of people about healthy food habits. She holds a Bachelor of Science in mathematics from [employer], [place], and a Master of Science in biochemistry from [employer]. 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 [employer] [facility] made her realize that food habits beat genetics for good health. Glucose Revolution is her first book.

## 2023-11-19 ENCOUNTER — PATIENT MESSAGE (OUTPATIENT)
Dept: NEUROSURGERY | Facility: CLINIC | Age: 33
End: 2023-11-19
Payer: COMMERCIAL

## 2023-11-20 ENCOUNTER — PATIENT MESSAGE (OUTPATIENT)
Dept: NEUROSURGERY | Facility: CLINIC | Age: 33
End: 2023-11-20
Payer: COMMERCIAL

## 2023-12-15 ENCOUNTER — PATIENT MESSAGE (OUTPATIENT)
Dept: FAMILY MEDICINE | Facility: CLINIC | Age: 33
End: 2023-12-15
Payer: COMMERCIAL

## 2024-01-11 ENCOUNTER — OFFICE VISIT (OUTPATIENT)
Dept: FAMILY MEDICINE | Facility: CLINIC | Age: 34
End: 2024-01-11
Payer: COMMERCIAL

## 2024-01-11 VITALS
HEIGHT: 66 IN | HEART RATE: 71 BPM | TEMPERATURE: 98 F | BODY MASS INDEX: 47.09 KG/M2 | SYSTOLIC BLOOD PRESSURE: 108 MMHG | OXYGEN SATURATION: 100 % | DIASTOLIC BLOOD PRESSURE: 68 MMHG | WEIGHT: 293 LBS

## 2024-01-11 DIAGNOSIS — U07.1 COVID-19: Primary | ICD-10-CM

## 2024-01-11 DIAGNOSIS — E66.01 CLASS 3 SEVERE OBESITY WITH BODY MASS INDEX (BMI) OF 60.0 TO 69.9 IN ADULT, UNSPECIFIED OBESITY TYPE, UNSPECIFIED WHETHER SERIOUS COMORBIDITY PRESENT: ICD-10-CM

## 2024-01-11 LAB
CTP QC/QA: YES
SARS-COV-2 RDRP RESP QL NAA+PROBE: POSITIVE

## 2024-01-11 PROCEDURE — 99999 PR PBB SHADOW E&M-EST. PATIENT-LVL IV: CPT | Mod: PBBFAC,,, | Performed by: STUDENT IN AN ORGANIZED HEALTH CARE EDUCATION/TRAINING PROGRAM

## 2024-01-11 PROCEDURE — 99214 OFFICE O/P EST MOD 30 MIN: CPT | Mod: S$GLB,,, | Performed by: STUDENT IN AN ORGANIZED HEALTH CARE EDUCATION/TRAINING PROGRAM

## 2024-01-11 PROCEDURE — 87635 SARS-COV-2 COVID-19 AMP PRB: CPT | Mod: QW,S$GLB,, | Performed by: STUDENT IN AN ORGANIZED HEALTH CARE EDUCATION/TRAINING PROGRAM

## 2024-01-11 RX ORDER — ALBUTEROL SULFATE 90 UG/1
2 AEROSOL, METERED RESPIRATORY (INHALATION)
COMMUNITY
Start: 2024-01-03

## 2024-01-11 RX ORDER — TIZANIDINE 4 MG/1
4 TABLET ORAL NIGHTLY
COMMUNITY
Start: 2024-01-02

## 2024-01-11 RX ORDER — BUPROPION HYDROCHLORIDE 100 MG/1
100 TABLET ORAL DAILY
Qty: 90 TABLET | Refills: 3 | Status: SHIPPED | OUTPATIENT
Start: 2024-01-11 | End: 2025-01-10

## 2024-01-11 NOTE — PATIENT INSTRUCTIONS
Carlos Dove,     If you are due for any health screening(s) below please notify me so we can arrange them to be ordered and scheduled. Most healthy patients at your age complete them, but you are free to accept or refuse.     If you can't do it, I'll definitely understand. If you can, I'd certainly appreciate it!    All of your core healthy metrics are met.

## 2024-01-11 NOTE — PROGRESS NOTES
[facility] Note    Subjective:  Chief Complaint:   Chief Complaint   Patient presents with    Follow-up       History of Present Illness:  Petty is a pleasant intelligent patient who is here for problem visit     Recent COVID diagnosis last week (1/3) at  in [place]  Symptoms had started that night, +sinus congestion   Prescribed cough syrup and inhaler   Symptoms worsened and now Improving, persistent cough  Denies fevers   Requesting retesting for negative test prior to return to work       BMI 60.17  Currently on tirzepatide 5 paying out of pocket, expense prohibitive   Reports going to the gym   Requesting Adipex         Allergies:  Review of patient's allergies indicates:  No Known Allergies    Home Medications:  Current Outpatient Medications on File Prior to Visit   Medication Sig    albuterol (PROVENTIL/VENTOLIN HFA) 90 mcg/actuation inhaler Inhale 2 puffs into the lungs.    meloxicam (MOBIC) 15 MG tablet Take 1 tablet (15 mg total) by mouth once daily.    tirzepatide 5 mg/0.5 mL PnIj Inject 100 mg into the skin every 7 days.    tiZANidine (ZANAFLEX) 4 MG tablet Take 4 mg by mouth every evening.    valACYclovir (VALTREX) 1000 MG tablet Take 1 tablet (1,000 mg total) by mouth 2 (two) times daily. for 15 days     Current Facility-Administered Medications on File Prior to Visit   Medication    lactated ringers infusion       Past Medical History:   Diagnosis Date    Other closed displaced fracture of distal end of right humerus, initial encounter      Past Surgical History:   Procedure Laterality Date    OPEN REDUCTION AND INTERNAL FIXATION (ORIF) OF FRACTURE OF DISTAL HUMERUS Right 6/23/2023    Procedure: ORIF, FRACTURE, HUMERUS, DISTAL;  Surgeon: Merlin Lemus MD;  Location: [facility];  Service: Orthopedics;  Laterality: Right;    WISDOM TOOTH EXTRACTION       Family History   Problem Relation Age of Onset    No Known Problems Mother     Cancer Father         lung    Cerebral aneurysm Father   "    Social History     Tobacco Use    Smoking status: Never    Smokeless tobacco: Never   Substance Use Topics    Alcohol use: Not Currently    Drug use: Never            The patient's past medical history, surgical history, social history, family history, allergies and medications have been reviewed.    Review of Systems     10 point review of systems was conducted and only the pertinent positives and pertinent negatives are noted above in the HPI section.    Physical Examination  General appearance: alert, cooperative, no distress  HEENT: normocephalic, atraumatic, PERRLA, TMs visualized bilaterally not impacted by cerumen, no nasal septal deviation, oropharynx mucosa pink and moist without tonsillar exudates  Neck: trachea midline, no LAD, no thyromegaly, no neck stiffness  Lungs: clear to auscultation, no wheezes, rales or rhonchi, symmetric air entry  Heart: normal rate, regular rhythm, normal S1, S2, no murmurs, rubs, clicks or gallops  Abdomen: soft, nontender, nondistended, no rigidity, rebound, or guarding.   Back: no point tenderness over spine  Skin: No rashes or abnormal skin lesions, no apparent jaundice or bruising  Extremities: Full ROM of all extremities, peripheral pulses normal, no unilateral leg swelling or calf tenderness   Neurological:alert, oriented, normal speech, no new focal findings or movement disorder noted from baseline      BP Readings from Last 3 Encounters:   01/11/24 108/68   11/16/23 138/88   11/10/23 (!) 160/92     Wt Readings from Last 3 Encounters:   01/11/24 (!) 169.1 kg (372 lb 12.8 oz)   11/22/23 (!) 173.7 kg (383 lb)   11/16/23 (!) 172.5 kg (380 lb 4.7 oz)     /68 (BP Location: Right forearm, Patient Position: Sitting, BP Method: Large (Manual))   Pulse 71   Temp 98.1 °F (36.7 °C) (Oral)   Ht 5' 6" (1.676 m)   Wt (!) 169.1 kg (372 lb 12.8 oz)   SpO2 100%   BMI 60.17 kg/m²    274}  Laboratory: I have reviewed old labs below:    274}    Lab Results   Component " Value Date    WBC 7.63 11/16/2023    HGB 12.4 11/16/2023    HCT 39.8 11/16/2023    MCV 92 11/16/2023     11/16/2023     11/16/2023    K 4.0 11/16/2023     11/16/2023    CALCIUM 9.4 11/16/2023    CO2 24 11/16/2023    GLU 72 11/16/2023    BUN 12 11/16/2023    CREATININE 0.6 11/16/2023    EGFRNORACEVR >60.0 11/16/2023    ANIONGAP 11 11/16/2023    PROT 7.7 11/16/2023    ALBUMIN 3.7 11/16/2023    BILITOT 0.4 11/16/2023    ALKPHOS 66 11/16/2023    ALT 19 11/16/2023    AST 21 11/16/2023    CHOL 149 11/16/2023    TRIG 59 11/16/2023    HDL 40 11/16/2023    LDLCALC 97.2 11/16/2023    TSH 1.825 11/16/2023    HGBA1C 5.3 06/23/2023      Lab reviewed by me: Particular labs of significance that I will monitor, workup, or treat to improve are mentioned below in diagnostic impression remarks.    Imaging/EKG: I have reviewed the pertinent results and my findings are noted in remarks.  274}    CC:   Chief Complaint   Patient presents with    Follow-up        274}    Assessment/Plan  Petty Donato is a 33 y.o. female who presents to clinic with:  1. COVID-19    2. Class 3 severe obesity with body mass index (BMI) of 60.0 to 69.9 in adult, unspecified obesity type, unspecified whether serious comorbidity present       274}  Diagnostic Impression Remarks       33 y.o. female who presents to clinic today for     This is the extent of this pleasant patient's concerns at this present time. She did not feel chest pain upon exertion, dyspnea, nausea, vomiting, diaphoresis, or syncope. No pleuritic chest pain, unilateral leg swelling, calf tenderness, or calf pain. Negative for unintentional weight loss night sweats, hematuria, and fevers. Petty will return to clinic in a few months for further workup and reassessment or sooner as needed. She was instructed to call the clinic or go to the emergency department or urgent care immediately if her symptoms do not improve, worsens, or if any new symptoms develop. As we  discussed that symptoms could worsen over the next 24 hours she was advised that if any increased swelling, pain, or numbness arise to go immediately to the ED. Patient knows to call any time if an emergency arises. Shared decision making occurred and she verbalized understanding in agreement with this plan. I discussed imaging findings, diagnosis, possibilities, treatment options, medications, risks, and benefits. She had many questions regarding the options and long-term effects. All questions were answered. She expressed understanding after counseling regarding the diagnosis and recommendations. She was capable and demonstrated competence with understanding of these options. Shared decision making was performed resulting in her choosing the current treatment plan. Patient handout was given with instructions and recommendations. Advised the patient that if they become pregnant to alert us immediately to assess for medication changes. Having a healthy weight can decrease the risk of 13 cancers and is an important goal. I also discussed the importance of close follow up to discuss labs, change or modify her medications if needed, monitor side effects, and further evaluation of medical problems.     Additional workup planned: see labs ordered below.    See below for labs and meds ordered with associated diagnosis      1. COVID-19  - POCT COVID-19 Rapid Screening Positive     2. Class 3 severe obesity with body mass index (BMI) of 60.0 to 69.9 in adult, unspecified obesity type, unspecified whether serious comorbidity present  - buPROPion (WELLBUTRIN) 100 MG tablet; Take 1 tablet (100 mg total) by mouth once daily.  Dispense: 90 tablet; Refill: 3  - naltrexone capsule; Take 1 capsule (4.5 mg total) by mouth 2 (two) times daily.  Dispense: 180 capsule; Refill: 3      If you had symptoms  Day 0 of isolation is the day of symptom onset, regardless of when you tested positive  Day 1 is the first full day after the day  your symptoms started  If you test positive for COVID-19, stay home for at least 5 days and isolate from others in your home.  You are likely most infectious during these first 5 days.  Wear a high-quality mask if you must be around others at home and in public.  Do not go places where you are unable to wear a mask. For travel guidance, see [facility]s Travel webpage.  Do not travel.  Stay home and separate from others as much as possible.  Use a separate bathroom, if possible.  Take steps to improve ventilation at home, if possible.  Dont share personal household items, like cups, towels, and utensils.  Monitor your symptoms. If you have an emergency warning sign (like trouble breathing), seek emergency medical care immediately.  End isolation based on how serious your COVID-19 symptoms were. Loss of taste and smell may persist for weeks or months after recovery and need not delay the end of isolation.      Your symptoms are improving  You may end isolation after day 5 if:  You are fever-free for 24 hours (without the use of fever-reducing medication).     If you continue to test positive on repeat testing through 10 days, you should continue to wear a mask and avoid people who are immunocompromised or at high risk for severe disease until you receive two sequential negative antigen test results.   Recommended home COVID testing   Reassurance     RTC 3 months or PRN     Vanesa William MD, [facility]   Family Medicine Physician  01/11/2024    Obesity  Lifestyle Modifications:  Physical activity   -Initiation of a graded aerobic exercise plan (goal 30-45 minutes per day 4-5 times per week)  -Participate in low intensity strength exercising and if unfamiliar with strength and conditioning training, I recommend joining a gym with access to a  to further instruct the patient.   Balanced diet  -Transition to a low salt, mediterranean-style diet which emphasizes:  Eating primarily plant-based foods, such as  fruits and vegetables, whole grains, legumes and nuts   Replacing butter with healthy fats, such as olive oil   Using herbs and spices instead of salt to flavor foods   Limiting red meat to no more than a few times a month   Eating fish and poultry at least twice a week      A reasonable weight loss goal of 1-2lbs per month to reach a goal of 5-10% weight loss in 5-6 months, or a BMI less than 25.    Drug Therapy, in addition to lifestyle modifications   Consistently use contraception to avoid pregnancy because of the increased risk of teratogenicity    Phentermine/topiramate (Qsymia) 3.75/23 mg starter dose x 2 weeks , increase to 7.5/46  After 12 weeks at the recommended dose, if adult patients have not lost at least 3% of baseline body weight or pediatric patients have not experienced a reduction of at least 3% of baseline BMI, increase the dosage  The suggested follow-up is 2-8 weeks  It is not known if Qsymia changes your risk of heart problems or stroke or of death due to heart problems or stroke  It is not known if Qsymia is safe and effective when taken with other prescription, over-the-counter, or herbal weight loss products  Contraindictions: Pregnancy. Glaucoma. Hyperthyroidism. During or within 14 days following the administration of monoamine oxidase inhibitors. Known hypersensitivity or idiosyncrasy to the sympathomimetic amines.  [email or web address]    Orlistat (Xenical)   The most common side effect of orlistat use is steatorrhea, which occurs because of the impaired absorption of dietary fat. Other side effects include fecal spotting, diarrhea, abdominal pain, and anal fissures.  Orlistat is contraindicated in: Hypersensitivity to orlistat or its constituents, Chronic malabsorption, Cholestasis, Anorexia and bulimia, Pregnancy, Severe renal impairment.  Use with caution in patients with anorexia or bulimia nervosa.    Adipex   Advised adipex is intended for short term use only with  max of 3 mths tx. Discussed potential risks and side effects of adipex - dry mouth, flushing/sweating, increased HR/palpitations, increased BP, or possible heart rhythm disturbance. Rx requires monthly visit to monitor weight and BP, and may be discontinued if not losing weight. Adipex must be used in conjunction with healthy, reduced calorie diet and exercise. Advised weight loss achieved with adipex will not be maintained without permanent behavioral and lifestyle changes. Patient voices understanding and feels potential benefit outweighs risk and after shared decision making will send in a prescription.  Adipex in contraindicated in: history of cardiovascular disease (eg, coronary artery disease, stroke, arrhythmias, congestive heart failure, uncontrolled hypertension). Hyperthyroidism. Glaucoma. Agitated states.    Bupropion/naltrexone (Contrave) 8/90 mg  Wellbutrin and naltrexone for weight loss. Medication, side effects and proper use discussed. Common side effects include nausea or vomiting, dizziness, dry mouth, constipation and headache. Serious side effects include seizures, allergic reactions and mental disturbances like suicidal thoughts, irritability, anxiety or agitation and some skin reactions. Bupropion is the active ingredient responsible for Contrave's black box warning. In general, many antidepressants are known to stimulate suicidal thoughts and behaviours in children, adolescents and young adults (i.e. those under 25 years of age) with major depressive or psychiatric disorders. Pt verbalized understanding and was in agreement with the plan.    Low-dose Naltrexone aids in weight loss, especially for those with insulin resistance or overeating issues. Combined with a healthy diet and moderate exercise, it often yields significant results. It's commonly prescribed with bupropion as Contrave®. Taking Naltrexone in the early morning can reduce appetite by 30%, aiding weight loss. Morning dosing  suppresses cravings all day, enhancing your weight loss regimen.     Liraglutide (if failure to lose AND BMI >40 or >35 with comorbidity)    Ozempic  Discussed MOA, side effects, including n/v/pancreatitis/medullary thyroid ca. Instructed patient to notify me of any n/v/abdominal pain. Discussed proper dosing and administration.  Discussed that this medication is well known to cause weight loss and obesity is a large risk factor for multiple different cancers and by losing weight this can decrease the overall cancer risk of multiple organs.  This class of medication also has cardiovascular benefits and multiple studies show benefits decreasing cardiovascular risk.  We believe that the benefits outweigh the risks and after shared decision-making the patient decided to try this medication.   Counseled on use of contraception while on ozempic, and immediate discontinuation if she were to become pregnant.    Patient denies any personal or family history of Medullary thyroid carcinoma (MTC), multiple endocrine neoplasia syndrome type 2 (MEN 2), thyroid cancer, thyroid C-cell tumors , nor h/o pancreatitis.   - semaglutide (OZEMPIC) 0.25 mg or 0.5 mg (2 mg/3 mL) pen injector; Inject 0.25 mg into the skin every 7 days.  Dispense: 1.5 mL; Refill: 2  Ozempic® is contraindicated if you or any of your family have ever had MTC, or if you have an endocrine system condition called Multiple Endocrine Neoplasia syndrome type 2 (MEN 2).    Consider Bariatric surgery

## 2024-02-22 ENCOUNTER — HOSPITAL ENCOUNTER (OUTPATIENT)
Dept: RADIOLOGY | Facility: HOSPITAL | Age: 34
Discharge: HOME | End: 2024-02-22
Attending: NURSE PRACTITIONER
Payer: COMMERCIAL

## 2024-02-22 DIAGNOSIS — N60.09 CYST OF BREAST, UNSPECIFIED LATERALITY: ICD-10-CM

## 2024-02-22 PROCEDURE — 76642 ULTRASOUND BREAST LIMITED: CPT | Mod: 26,50,, | Performed by: RADIOLOGY

## 2024-02-22 PROCEDURE — 76642 ULTRASOUND BREAST LIMITED: CPT | Mod: TC,50

## 2024-02-22 PROCEDURE — 77062 BREAST TOMOSYNTHESIS BI: CPT | Mod: 26,,, | Performed by: RADIOLOGY

## 2024-02-22 PROCEDURE — 77066 DX MAMMO INCL CAD BI: CPT | Mod: 26,,, | Performed by: RADIOLOGY

## 2024-02-22 PROCEDURE — 77066 DX MAMMO INCL CAD BI: CPT | Mod: TC

## 2024-03-19 ENCOUNTER — PATIENT MESSAGE (OUTPATIENT)
Dept: FAMILY MEDICINE | Facility: CLINIC | Age: 34
End: 2024-03-19
Payer: COMMERCIAL

## 2024-03-20 NOTE — TELEPHONE ENCOUNTER
Spoke with patient    She stopped the medication last week.  She states she is feeling better.   She will keep her scheduled appointment on 4/11/2024.   Refused sooner appointment.

## 2024-04-11 ENCOUNTER — TELEPHONE (OUTPATIENT)
Dept: FAMILY MEDICINE | Facility: CLINIC | Age: 34
End: 2024-04-11
Payer: COMMERCIAL

## 2024-04-11 NOTE — TELEPHONE ENCOUNTER
Called pt, no answer. Message left for pt to call our clinic.  Appt for today, 4/11 canceled due to weather event. Need to r/s

## 2024-04-17 ENCOUNTER — OFFICE VISIT (OUTPATIENT)
Dept: FAMILY MEDICINE | Facility: CLINIC | Age: 34
End: 2024-04-17
Payer: COMMERCIAL

## 2024-04-17 VITALS
BODY MASS INDEX: 47.09 KG/M2 | WEIGHT: 293 LBS | HEART RATE: 79 BPM | DIASTOLIC BLOOD PRESSURE: 78 MMHG | OXYGEN SATURATION: 98 % | TEMPERATURE: 98 F | HEIGHT: 66 IN | SYSTOLIC BLOOD PRESSURE: 120 MMHG

## 2024-04-17 DIAGNOSIS — E66.01 CLASS 3 SEVERE OBESITY DUE TO EXCESS CALORIES WITHOUT SERIOUS COMORBIDITY WITH BODY MASS INDEX (BMI) OF 60.0 TO 69.9 IN ADULT: Primary | ICD-10-CM

## 2024-04-17 PROCEDURE — 99999 PR PBB SHADOW E&M-EST. PATIENT-LVL III: CPT | Mod: PBBFAC,,, | Performed by: STUDENT IN AN ORGANIZED HEALTH CARE EDUCATION/TRAINING PROGRAM

## 2024-04-17 PROCEDURE — 99214 OFFICE O/P EST MOD 30 MIN: CPT | Mod: S$GLB,,, | Performed by: STUDENT IN AN ORGANIZED HEALTH CARE EDUCATION/TRAINING PROGRAM

## 2024-04-17 RX ORDER — PHENTERMINE HYDROCHLORIDE 37.5 MG/1
37.5 TABLET ORAL
Qty: 30 TABLET | Refills: 0 | Status: SHIPPED | OUTPATIENT
Start: 2024-04-17 | End: 2024-05-16 | Stop reason: SDUPTHER

## 2024-04-17 NOTE — PROGRESS NOTES
[facility] Note    Subjective:  Chief Complaint:   Chief Complaint   Patient presents with    Follow-up       History of Present Illness:  Petty is here for follow up   Feels well today     BMI 61 > 63  Started on Wellbutrin 100 mg and naltrexone 4.5 mg bid - discontinued 2/2 se  Previously trial mounjouro $800/mo cost prohibitive   Previously trial phentermine tolerated well     Weight intake  What has your weight been like over time?  Since childhood   Note anything you feel has been significant to  help you lose weight or reasons you might have gained weight. no    Are you interested in medication to help you lose weight? 10/10  Are you interested in meal replacement? Never considered   Are you interested in surgery to help you lose weight? Yes , last resort   How confident are you that you will be able to lose weight? 8/10    What does a typical day of eating look like for you?  Breakfast: hot line at work eggs, sausage, biscuit  (works nights)  Snacks: sm bag of chips ([employer])   Lunch: cheeseburger at work , fruits   Snacks: granola bar ([facility] high protein)   Dinner: protein and vegetable , potato   Snacks:  protein shake   Drinks: coke , otherwise zero sugar drinks   What about weekends and special occasions? Same , maybe add desert on special occasions   What type, if any, exercise do you currently do? Crossfit at the gym 3-5 days a week     Medications possibly negatively affecting weight? none    Max weight:  now  Current weight: 392 lbs  Goal weight:  none, to feel healthy     Contraception: none     R/o anxiety/depression  NOHELIA-7     Not at all Several days More than half the days Nearly every day   Over the last two weeks, how often have you been bothered by the following problems?   1. Feeling nervous, anxious, or on edge 0x 1 2 3   2. Not being able to stop or control worrying 0 1 2x 3   3. Worrying too much about different things 0 1 2x 3   4. Trouble relaxing 0x 1 2 3   5. Being so  restless that it is hard to sit still 0x 1 2 3   6. Becoming easily annoyed or irritable 0 1 2 3x   7. Feeling afraid as if something awful might happen 0x 1 2 3   Total score*¶ ____7_ = Add Columns _____ + _____ + _____   If you checked off any problems, how difficult have these problems made it for you to do your work, take care of things at home, or get along with other people?   [place] one Not difficult at all Somewhat difficult Very difficult Extremely difficult   * Score: 5 to 9 = mild anxiety; 10 to 14 = moderate anxiety; 15 to 21 = severe anxiety.    PHQ-9  Name ______________________ Date _________   Over the last 2 weeks, how often have you been bothered by any of the following problems? Not at all Several days More than half the days Nearly every day   1. Little interest or pleasure in doing things 0x 1 2 3   2. Feeling down, depressed, or hopeless 0x 1 2 3   3. Trouble falling or staying asleep, or sleeping too much 0 1 2x 3   4. Feeling tired or having little energy 0 1 2x 3   5. Poor appetite or overeating 0 1 2 3x   6. Feeling bad about yourself--or that you are a failure or have let yourself or your family down 0x 1 2 3   7. Trouble concentrating on things, such as reading the newspaper or watching television 0x 1 2 3   8. Moving or speaking so slowly that other people could have noticed? Or the opposite--being so fidgety or restless that you have been moving around a lot more than usual 0x 1 2 3   9. Thoughts that you would be better off dead or of hurting yourself in some way 0x 1 2 3   (For office coding: Total Score __7__ = ____ + ____ + ____)   Depression Severity: 0-4 none, 5-9 mild, 10-14 moderate, 15-19 moderately severe, 20-27 severe.    R/o binge eating disorder  BED-7  The following questions ask about your eating patterns and behaviours within the last 3 months. For each question, choose the answer that best applies to you.  1. During the last 3 months, did you have any episodes of  excessive overeating (i.e., eating significantly more than what most people would eat in a similar period of time)?  No      Allergies:  Review of patient's allergies indicates:  No Known Allergies    Home Medications:  Current Outpatient Medications   Medication Sig Dispense Refill    valACYclovir (VALTREX) 500 MG tablet Take 1 tablet (500 mg total) by mouth once daily. 90 tablet 3    phentermine (ADIPEX-P) 37.5 mg tablet Take 1 tablet (37.5 mg total) by mouth before breakfast. 30 tablet 0     No current facility-administered medications for this visit.     Facility-Administered Medications Ordered in Other Visits   Medication Dose Route Frequency Provider Last Rate Last Admin    lactated ringers infusion   Intravenous Continuous Bee Redmond MD 20 mL/hr at 06/23/23 1016 Restarted at 06/23/23 1033       Past Medical History:   Diagnosis Date    Other closed displaced fracture of distal end of right humerus, initial encounter      Past Surgical History:   Procedure Laterality Date    OPEN REDUCTION AND INTERNAL FIXATION (ORIF) OF FRACTURE OF DISTAL HUMERUS Right 6/23/2023    Procedure: ORIF, FRACTURE, HUMERUS, DISTAL;  Surgeon: Merlin Lemus MD;  Location: [facility];  Service: Orthopedics;  Laterality: Right;    WISDOM TOOTH EXTRACTION       Family History   Problem Relation Name Age of Onset    No Known Problems Mother      Cancer Father          lung    Cerebral aneurysm Father       Social History     Tobacco Use    Smoking status: Never    Smokeless tobacco: Never   Substance Use Topics    Alcohol use: Not Currently    Drug use: Never            The patient's past medical history, surgical history, social history, family history, allergies and medications have been reviewed.    Review of Systems     10 point review of systems was conducted and only the pertinent positives and pertinent negatives are noted above in the HPI section.    Physical Examination  General appearance: alert, cooperative, no  "distress  HEENT: normocephalic, atraumatic, PERRLA   Neck: trachea midline, no LAD, no thyromegaly, no neck stiffness  Lungs: clear to auscultation, no wheezes, rales or rhonchi, symmetric air entry  Heart: normal rate, regular rhythm, normal S1, S2, no murmurs, rubs, clicks or gallops  Skin: No rashes or abnormal skin lesions, no apparent jaundice or bruising  Extremities: Full ROM of all extremities, peripheral pulses normal, no unilateral leg swelling or calf tenderness   Neurological:alert, oriented, normal speech, no new focal findings or movement disorder noted from baseline      BP Readings from Last 3 Encounters:   04/17/24 120/78   03/26/24 (!) 150/86   01/31/24 130/82     Wt Readings from Last 3 Encounters:   04/17/24 (!) 178.1 kg (392 lb 10.2 oz)   03/26/24 (!) 171.9 kg (379 lb)   01/31/24 (!) 168.3 kg (371 lb)     /78 (BP Location: Left arm, Patient Position: Sitting, BP Method: Large (Manual))   Pulse 79   Temp 98.3 °F (36.8 °C) (Oral)   Ht 5' 6" (1.676 m)   Wt (!) 178.1 kg (392 lb 10.2 oz)   LMP 03/04/2024   SpO2 98%   BMI 63.37 kg/m²    274}  Laboratory: I have reviewed old labs below:    274}    Lab Results   Component Value Date    WBC 7.63 11/16/2023    HGB 12.4 11/16/2023    HCT 39.8 11/16/2023    MCV 92 11/16/2023     11/16/2023     11/16/2023    K 4.0 11/16/2023     11/16/2023    CALCIUM 9.4 11/16/2023    CO2 24 11/16/2023    GLU 72 11/16/2023    BUN 12 11/16/2023    CREATININE 0.6 11/16/2023    EGFRNORACEVR >60.0 11/16/2023    ANIONGAP 11 11/16/2023    PROT 7.7 11/16/2023    ALBUMIN 3.7 11/16/2023    BILITOT 0.4 11/16/2023    ALKPHOS 66 11/16/2023    ALT 19 11/16/2023    AST 21 11/16/2023    CHOL 149 11/16/2023    TRIG 59 11/16/2023    HDL 40 11/16/2023    LDLCALC 97.2 11/16/2023    TSH 1.825 11/16/2023    HGBA1C 5.3 06/23/2023      Lab reviewed by me: Particular labs of significance that I will monitor, workup, or treat to improve are mentioned below in " diagnostic impression remarks.    Imaging/EKG: I have reviewed the pertinent results and my findings are noted in remarks.  274}    CC:   Chief Complaint   Patient presents with    Follow-up        274}    Assessment/Plan  Petty Donato is a 33 y.o. female who presents to clinic with:  1. Class 3 severe obesity due to excess calories without serious comorbidity with body mass index (BMI) of 60.0 to 69.9 in adult       274}  Diagnostic Impression Remarks       33 y.o. female who presents to clinic today for weight loss follow up     This is the extent of this pleasant patient's concerns at this present time.  I also discussed the importance of close follow up to discuss labs, change or modify her medications if needed, monitor side effects, and further evaluation of medical problems.     Additional workup planned: see labs ordered below.    See below for labs and meds ordered with associated diagnosis      1. Class 3 severe obesity due to excess calories without serious comorbidity with body mass index (BMI) of 60.0 to 69.9 in adult  - phentermine (ADIPEX-P) 37.5 mg tablet; Take 1 tablet (37.5 mg total) by mouth before breakfast.  Dispense: 30 tablet; Refill: 0  Discussed and provided Plan to Success handout overview of dietary choices, exercise and medication options  Discussed meal replacement programs for when unable to cook, discussed replacing regular coke with diet   Next visit further review healthier choices at work     RTC 1 month weight loss f/u     Vanesa William MD, [facility]  Family Medicine Physician  04/17/2024      How much weight do I need to lose?  From a medical standpoint, a 5-10% weight loss often has the most impact on your health. We recognize people feel their health is optimal at a variety of different weights. Often focusing more on how you feel, what your blood sugar is, what your blood pressure is, or your relationship with food is more important than what the scale says. Some of our  patients don't even look at the scale, depending on their goals.    What about medications?  We offer a variety of prescription medications including phentermine, Contrave, Saxenda, Wegovy. Our comprehensive intake will include discussing which if any of these medications are the best option for  you. If medications work and do not cause side effects they are generally used long term.    How often do I need to be seen?  We have discovered that regardless of what plan we help you pick the biggest predictor of success is sticking with the monthly follow up. It is also dictated by the [facility] Board of Medical Examiners that any patient on weight loss medications should be closely and frequently monitored by the physician, no less than monthly. Use should not continue if the patient does not demonstrate clinically significant weight loss since the last examination. Some patients will need to be seen more often. Once you are in maintenance, we recommend being seen every 12 weeks. It is often harder to keep weight off than to lose it, and following up long term is key.    How long does it take to lose weight?  Most people will lose the most weight their first month of making lifestyle changes. Many people lose most their weight in the first 6 months. Being ready to commit to change is key to get the most out of your effort.    What about exercise?  Exercise is very, very good for your health. It protects your heart, bones, and mood. Exercise helps you keep weight off once you lose it. Exercise does not help most people lose weight. BUT it does help you keep from regaining weight. All forms of exercise have benefits and we will help support you in an exercise plan to help your health and help you maintain weight.

## 2024-05-16 ENCOUNTER — OFFICE VISIT (OUTPATIENT)
Dept: FAMILY MEDICINE | Facility: CLINIC | Age: 34
End: 2024-05-16
Payer: COMMERCIAL

## 2024-05-16 VITALS
OXYGEN SATURATION: 97 % | TEMPERATURE: 98 F | HEIGHT: 66 IN | HEART RATE: 106 BPM | DIASTOLIC BLOOD PRESSURE: 68 MMHG | WEIGHT: 293 LBS | SYSTOLIC BLOOD PRESSURE: 110 MMHG | BODY MASS INDEX: 47.09 KG/M2

## 2024-05-16 DIAGNOSIS — E66.01 CLASS 3 SEVERE OBESITY DUE TO EXCESS CALORIES WITHOUT SERIOUS COMORBIDITY WITH BODY MASS INDEX (BMI) OF 60.0 TO 69.9 IN ADULT: Primary | ICD-10-CM

## 2024-05-16 PROCEDURE — 99999 PR PBB SHADOW E&M-EST. PATIENT-LVL III: CPT | Mod: PBBFAC,,, | Performed by: STUDENT IN AN ORGANIZED HEALTH CARE EDUCATION/TRAINING PROGRAM

## 2024-05-16 PROCEDURE — 99214 OFFICE O/P EST MOD 30 MIN: CPT | Mod: S$GLB,,, | Performed by: STUDENT IN AN ORGANIZED HEALTH CARE EDUCATION/TRAINING PROGRAM

## 2024-05-16 RX ORDER — PHENTERMINE HYDROCHLORIDE 37.5 MG/1
37.5 TABLET ORAL
Qty: 30 TABLET | Refills: 0 | Status: SHIPPED | OUTPATIENT
Start: 2024-05-16 | End: 2024-06-15

## 2024-05-16 NOTE — PROGRESS NOTES
[facility]    Subjective:  Chief Complaint:   Chief Complaint   Patient presents with    Follow-up       History of Present Illness:  Petty is here for weight loss follow up     BMI 63 > 60  Started on Adipex x 1 month , tolerating well  Flips schedule on Thursday so skips that day   Started using Keep it Real meals as lunch substitution   392 to 377 lbs       Allergies:  Review of patient's allergies indicates:  No Known Allergies    Home Medications:  Current Outpatient Medications on File Prior to Visit   Medication Sig    methocarbamoL (ROBAXIN) 500 MG Tab     nystatin (MYCOSTATIN) ointment Apply topically 2 (two) times daily.    valACYclovir (VALTREX) 500 MG tablet Take 1 tablet (500 mg total) by mouth once daily.    [DISCONTINUED] phentermine (ADIPEX-P) 37.5 mg tablet Take 1 tablet (37.5 mg total) by mouth before breakfast.     Current Facility-Administered Medications on File Prior to Visit   Medication    lactated ringers infusion       Past Medical History:   Diagnosis Date    Other closed displaced fracture of distal end of right humerus, initial encounter      Past Surgical History:   Procedure Laterality Date    OPEN REDUCTION AND INTERNAL FIXATION (ORIF) OF FRACTURE OF DISTAL HUMERUS Right 6/23/2023    Procedure: ORIF, FRACTURE, HUMERUS, DISTAL;  Surgeon: Merlin Lemus MD;  Location: [facility];  Service: Orthopedics;  Laterality: Right;    WISDOM TOOTH EXTRACTION       Family History   Problem Relation Name Age of Onset    No Known Problems Mother      Cancer Father          lung    Cerebral aneurysm Father       Social History     Tobacco Use    Smoking status: Never    Smokeless tobacco: Never   Substance Use Topics    Alcohol use: Not Currently    Drug use: Never            The patient's past medical history, surgical history, social history, family history, allergies and medications have been reviewed.    Review of Systems     10 point review of systems was conducted and only  "the pertinent positives and pertinent negatives are noted above in the HPI section.    Physical Examination  General appearance: alert, cooperative, no distress  HEENT: normocephalic, atraumatic, PERRLA   Neck: trachea midline,   no neck stiffness  Lungs: clear to auscultation, no wheezes, rales or rhonchi, symmetric air entry  Heart: normal rate, regular rhythm, normal S1, S2, no murmurs, rubs, clicks or gallops  Skin: No rashes or abnormal skin lesions, no apparent jaundice or bruising  Extremities: Full ROM of all extremities, peripheral pulses normal, no unilateral leg swelling or calf tenderness   Neurological:alert, oriented, normal speech, no new focal findings or movement disorder noted from baseline      BP Readings from Last 3 Encounters:   05/16/24 110/68   05/02/24 130/84   04/17/24 120/78     Wt Readings from Last 3 Encounters:   05/16/24 (!) 171.2 kg (377 lb 6.8 oz)   05/02/24 (!) 171.5 kg (378 lb)   04/17/24 (!) 178.1 kg (392 lb 10.2 oz)     /68 (BP Location: Left arm, Patient Position: Sitting, BP Method: Large (Manual))   Pulse 106   Temp 98.3 °F (36.8 °C) (Oral)   Ht 5' 6" (1.676 m)   Wt (!) 171.2 kg (377 lb 6.8 oz)   SpO2 97%   BMI 60.92 kg/m²    274}  Laboratory: I have reviewed old labs below:    274}    Lab Results   Component Value Date    WBC 7.63 11/16/2023    HGB 12.4 11/16/2023    HCT 39.8 11/16/2023    MCV 92 11/16/2023     11/16/2023     11/16/2023    K 4.0 11/16/2023     11/16/2023    CALCIUM 9.4 11/16/2023    CO2 24 11/16/2023    GLU 72 11/16/2023    BUN 12 11/16/2023    CREATININE 0.6 11/16/2023    EGFRNORACEVR >60.0 11/16/2023    ANIONGAP 11 11/16/2023    PROT 7.7 11/16/2023    ALBUMIN 3.7 11/16/2023    BILITOT 0.4 11/16/2023    ALKPHOS 66 11/16/2023    ALT 19 11/16/2023    AST 21 11/16/2023    CHOL 149 11/16/2023    TRIG 59 11/16/2023    HDL 40 11/16/2023    LDLCALC 97.2 11/16/2023    TSH 1.825 11/16/2023    HGBA1C 5.3 06/23/2023      Lab reviewed by " me: Particular labs of significance that I will monitor, workup, or treat to improve are mentioned below in diagnostic impression remarks.    Imaging/EKG: I have reviewed the pertinent results and my findings are noted in remarks.  274}    CC:   Chief Complaint   Patient presents with    Follow-up        274}    Assessment/Plan  Petty Donato is a 33 y.o. female who presents to clinic with:  1. Class 3 severe obesity due to excess calories without serious comorbidity with body mass index (BMI) of 60.0 to 69.9 in adult       274}  Diagnostic Impression Remarks       33 y.o. female who presents to clinic today for weight loss follow up     This is the extent of this pleasant patient's concerns at this present time.  I also discussed the importance of close follow up to discuss labs, change or modify her medications if needed, monitor side effects, and further evaluation of medical problems.     Additional workup planned: see labs ordered below.    See below for labs and meds ordered with associated diagnosis      1. Class 3 severe obesity due to excess calories without serious comorbidity with body mass index (BMI) of 60.0 to 69.9 in adult  - phentermine (ADIPEX-P) 37.5 mg tablet; Take 1 tablet (37.5 mg total) by mouth before breakfast.  Dispense: 30 tablet; Refill: 0  Continue meal replacement   Discussed and provided Healthy Habits handout     RTC 1 month weight loss follow up or PRN     Vanesa William MD, [facility]   Family Medicine Physician  05/16/2024

## 2024-05-31 ENCOUNTER — PATIENT MESSAGE (OUTPATIENT)
Dept: FAMILY MEDICINE | Facility: CLINIC | Age: 34
End: 2024-05-31
Payer: COMMERCIAL

## 2024-06-25 ENCOUNTER — OFFICE VISIT (OUTPATIENT)
Dept: FAMILY MEDICINE | Facility: CLINIC | Age: 34
End: 2024-06-25
Payer: COMMERCIAL

## 2024-06-25 VITALS
TEMPERATURE: 98 F | DIASTOLIC BLOOD PRESSURE: 78 MMHG | WEIGHT: 293 LBS | HEART RATE: 75 BPM | OXYGEN SATURATION: 99 % | SYSTOLIC BLOOD PRESSURE: 118 MMHG | BODY MASS INDEX: 47.09 KG/M2 | HEIGHT: 66 IN

## 2024-06-25 DIAGNOSIS — E66.01 CLASS 3 SEVERE OBESITY DUE TO EXCESS CALORIES WITHOUT SERIOUS COMORBIDITY WITH BODY MASS INDEX (BMI) OF 60.0 TO 69.9 IN ADULT: Primary | ICD-10-CM

## 2024-06-25 PROCEDURE — 99214 OFFICE O/P EST MOD 30 MIN: CPT | Mod: S$GLB,,, | Performed by: STUDENT IN AN ORGANIZED HEALTH CARE EDUCATION/TRAINING PROGRAM

## 2024-06-25 PROCEDURE — 99999 PR PBB SHADOW E&M-EST. PATIENT-LVL III: CPT | Mod: PBBFAC,,, | Performed by: STUDENT IN AN ORGANIZED HEALTH CARE EDUCATION/TRAINING PROGRAM

## 2024-06-25 RX ORDER — PHENTERMINE HYDROCHLORIDE 37.5 MG/1
37.5 TABLET ORAL
Qty: 30 TABLET | Refills: 0 | Status: SHIPPED | OUTPATIENT
Start: 2024-06-25 | End: 2024-07-25

## 2024-06-25 RX ORDER — PHENTERMINE HYDROCHLORIDE 37.5 MG/1
37.5 TABLET ORAL
COMMUNITY
End: 2024-06-25 | Stop reason: SDUPTHER

## 2024-06-25 NOTE — PROGRESS NOTES
[facility]    Subjective:  Chief Complaint:   Chief Complaint   Patient presents with    Follow-up       History of Present Illness:  Petty is here for weight loss follow up     BMI 63 > 60 > 59   11 pound weight loss since last month   Adipex 37.5 - tolerating well  Flips schedule on Thursday so skips that day   Diet: Keep it Real meals as lunch substitution , has crossfit meal plan with goal of 180 g protein   Exercise: crossfit       Allergies:  Review of patient's allergies indicates:  No Known Allergies    Home Medications:  Current Outpatient Medications on File Prior to Visit   Medication Sig    methocarbamoL (ROBAXIN) 500 MG Tab     nystatin (MYCOSTATIN) ointment Apply topically 2 (two) times daily.    valACYclovir (VALTREX) 500 MG tablet Take 1 tablet (500 mg total) by mouth once daily.    [DISCONTINUED] phentermine (ADIPEX-P) 37.5 mg tablet Take 37.5 mg by mouth before breakfast.     Current Facility-Administered Medications on File Prior to Visit   Medication    lactated ringers infusion       Past Medical History:   Diagnosis Date    Other closed displaced fracture of distal end of right humerus, initial encounter      Past Surgical History:   Procedure Laterality Date    OPEN REDUCTION AND INTERNAL FIXATION (ORIF) OF FRACTURE OF DISTAL HUMERUS Right 6/23/2023    Procedure: ORIF, FRACTURE, HUMERUS, DISTAL;  Surgeon: Merlin Lemus MD;  Location: [facility];  Service: Orthopedics;  Laterality: Right;    WISDOM TOOTH EXTRACTION       Family History   Problem Relation Name Age of Onset    No Known Problems Mother      Cancer Father          lung    Cerebral aneurysm Father       Social History     Tobacco Use    Smoking status: Never    Smokeless tobacco: Never   Substance Use Topics    Alcohol use: Not Currently    Drug use: Never            The patient's past medical history, surgical history, social history, family history, allergies and medications have been reviewed.    Review of  "Systems     10 point review of systems was conducted and only the pertinent positives and pertinent negatives are noted above in the HPI section.    Physical Examination  General appearance: alert, cooperative, no distress  HEENT: normocephalic, atraumatic, PERRLA,   Neck: trachea midline, no neck stiffness  Lungs: normal effort   Skin: No rashes or abnormal skin lesions, no apparent jaundice or bruising  Extremities: Full ROM of all extremities    Neurological:alert, oriented, normal speech, no new focal findings or movement disorder noted from baseline      BP Readings from Last 3 Encounters:   06/25/24 118/78   05/16/24 110/68   05/02/24 130/84     Wt Readings from Last 3 Encounters:   06/25/24 (!) 166.3 kg (366 lb 10 oz)   05/16/24 (!) 171.2 kg (377 lb 6.8 oz)   05/02/24 (!) 171.5 kg (378 lb)     /78 (BP Location: Left arm, Patient Position: Sitting, BP Method: Medium (Manual))   Pulse 75   Temp 98.1 °F (36.7 °C) (Oral)   Ht 5' 6" (1.676 m)   Wt (!) 166.3 kg (366 lb 10 oz)   SpO2 99%   BMI 59.17 kg/m²    274}  Laboratory: I have reviewed old labs below:    274}    Lab Results   Component Value Date    WBC 7.63 11/16/2023    HGB 12.4 11/16/2023    HCT 39.8 11/16/2023    MCV 92 11/16/2023     11/16/2023     11/16/2023    K 4.0 11/16/2023     11/16/2023    CALCIUM 9.4 11/16/2023    CO2 24 11/16/2023    GLU 72 11/16/2023    BUN 12 11/16/2023    CREATININE 0.6 11/16/2023    EGFRNORACEVR >60.0 11/16/2023    ANIONGAP 11 11/16/2023    PROT 7.7 11/16/2023    ALBUMIN 3.7 11/16/2023    BILITOT 0.4 11/16/2023    ALKPHOS 66 11/16/2023    ALT 19 11/16/2023    AST 21 11/16/2023    CHOL 149 11/16/2023    TRIG 59 11/16/2023    HDL 40 11/16/2023    LDLCALC 97.2 11/16/2023    TSH 1.825 11/16/2023    HGBA1C 5.3 06/23/2023      Lab reviewed by me: Particular labs of significance that I will monitor, workup, or treat to improve are mentioned below in diagnostic impression remarks.    Imaging/EKG: I have " reviewed the pertinent results and my findings are noted in remarks.  274}    CC:   Chief Complaint   Patient presents with    Follow-up        274}    Assessment/Plan  Petty Donato is a 34 y.o. female who presents to clinic with:  1. Class 3 severe obesity due to excess calories without serious comorbidity with body mass index (BMI) of 60.0 to 69.9 in adult       274}  Diagnostic Impression Remarks       34 y.o. female who presents to clinic today for weight loss counseling     This is the extent of this pleasant patient's concerns at this present time.  I also discussed the importance of close follow up to discuss labs, change or modify her medications if needed, monitor side effects, and further evaluation of medical problems.     Additional workup planned: see labs ordered below.    See below for labs and meds ordered with associated diagnosis      1. Class 3 severe obesity due to excess calories without serious comorbidity with body mass index (BMI) of 60.0 to 69.9 in adult  - phentermine (ADIPEX-P) 37.5 mg tablet; Take 1 tablet (37.5 mg total) by mouth before breakfast.  Dispense: 30 tablet; Refill: 0  Discussed and provided [employer] handout, protein goals.         RTC 1 month wt loss f/u or prn     Vanesa William MD, [facility]   Family Medicine Physician  06/25/2024

## 2024-07-22 ENCOUNTER — OFFICE VISIT (OUTPATIENT)
Dept: FAMILY MEDICINE | Facility: CLINIC | Age: 34
End: 2024-07-22
Payer: COMMERCIAL

## 2024-07-22 VITALS
HEART RATE: 108 BPM | WEIGHT: 293 LBS | OXYGEN SATURATION: 99 % | BODY MASS INDEX: 47.09 KG/M2 | DIASTOLIC BLOOD PRESSURE: 70 MMHG | TEMPERATURE: 98 F | HEIGHT: 66 IN | SYSTOLIC BLOOD PRESSURE: 120 MMHG

## 2024-07-22 DIAGNOSIS — E66.01 CLASS 3 SEVERE OBESITY DUE TO EXCESS CALORIES WITHOUT SERIOUS COMORBIDITY WITH BODY MASS INDEX (BMI) OF 60.0 TO 69.9 IN ADULT: Primary | ICD-10-CM

## 2024-07-22 PROCEDURE — 3074F SYST BP LT 130 MM HG: CPT | Mod: CPTII,S$GLB,, | Performed by: STUDENT IN AN ORGANIZED HEALTH CARE EDUCATION/TRAINING PROGRAM

## 2024-07-22 PROCEDURE — 1160F RVW MEDS BY RX/DR IN RCRD: CPT | Mod: CPTII,S$GLB,, | Performed by: STUDENT IN AN ORGANIZED HEALTH CARE EDUCATION/TRAINING PROGRAM

## 2024-07-22 PROCEDURE — 3078F DIAST BP <80 MM HG: CPT | Mod: CPTII,S$GLB,, | Performed by: STUDENT IN AN ORGANIZED HEALTH CARE EDUCATION/TRAINING PROGRAM

## 2024-07-22 PROCEDURE — 99214 OFFICE O/P EST MOD 30 MIN: CPT | Mod: S$GLB,,, | Performed by: STUDENT IN AN ORGANIZED HEALTH CARE EDUCATION/TRAINING PROGRAM

## 2024-07-22 PROCEDURE — 1159F MED LIST DOCD IN RCRD: CPT | Mod: CPTII,S$GLB,, | Performed by: STUDENT IN AN ORGANIZED HEALTH CARE EDUCATION/TRAINING PROGRAM

## 2024-07-22 PROCEDURE — 99999 PR PBB SHADOW E&M-EST. PATIENT-LVL IV: CPT | Mod: PBBFAC,,, | Performed by: STUDENT IN AN ORGANIZED HEALTH CARE EDUCATION/TRAINING PROGRAM

## 2024-07-22 PROCEDURE — 3008F BODY MASS INDEX DOCD: CPT | Mod: CPTII,S$GLB,, | Performed by: STUDENT IN AN ORGANIZED HEALTH CARE EDUCATION/TRAINING PROGRAM

## 2024-07-22 RX ORDER — PHENTERMINE HYDROCHLORIDE 37.5 MG/1
37.5 TABLET ORAL
Qty: 30 TABLET | Refills: 0 | Status: SHIPPED | OUTPATIENT
Start: 2024-07-22 | End: 2024-08-21

## 2024-07-22 NOTE — PROGRESS NOTES
[facility]    Subjective:  Chief Complaint:   Chief Complaint   Patient presents with    Follow-up       History of Present Illness:  Petty is here for routine follow up     BMI 63 > 60 > 59 > 58   4 pound weight loss since last month   Reports having family in town and more difficulty sticking to her meal plans   Adipex 37.5 - tolerating well  Flips schedule on Thursday so skips that day   Diet: Keep it Real meals as lunch substitution , has crossfit meal plan with goal of 180 g protein   Exercise: crossfit     Allergies:  Review of patient's allergies indicates:  No Known Allergies    Home Medications:  Current Outpatient Medications on File Prior to Visit   Medication Sig    meloxicam (MOBIC) 15 MG tablet Take 15 mg by mouth.    methocarbamoL (ROBAXIN) 500 MG Tab     nystatin (MYCOSTATIN) ointment Apply topically 2 (two) times daily.    valACYclovir (VALTREX) 500 MG tablet Take 1 tablet (500 mg total) by mouth once daily.    [DISCONTINUED] phentermine (ADIPEX-P) 37.5 mg tablet Take 1 tablet (37.5 mg total) by mouth before breakfast.     Current Facility-Administered Medications on File Prior to Visit   Medication    lactated ringers infusion       Past Medical History:   Diagnosis Date    Other closed displaced fracture of distal end of right humerus, initial encounter      Past Surgical History:   Procedure Laterality Date    OPEN REDUCTION AND INTERNAL FIXATION (ORIF) OF FRACTURE OF DISTAL HUMERUS Right 6/23/2023    Procedure: ORIF, FRACTURE, HUMERUS, DISTAL;  Surgeon: Merlin Lemus MD;  Location: [facility];  Service: Orthopedics;  Laterality: Right;    WISDOM TOOTH EXTRACTION       Family History   Problem Relation Name Age of Onset    No Known Problems Mother      Cancer Father          lung    Cerebral aneurysm Father       Social History     Tobacco Use    Smoking status: Never    Smokeless tobacco: Never   Substance Use Topics    Alcohol use: Not Currently    Drug use: Never          "   The patient's past medical history, surgical history, social history, family history, allergies and medications have been reviewed.    Review of Systems     10 point review of systems was conducted and only the pertinent positives and pertinent negatives are noted above in the HPI section.    Physical Examination  General appearance: alert, cooperative, no distress  HEENT: normocephalic, atraumatic, PERRLA   Neck: trachea midline,  no neck stiffness  Lungs:normal effort   Skin: No rashes or abnormal skin lesions, no apparent jaundice or bruising  Extremities: Full ROM of all extremities   Neurological:alert, oriented, normal speech, no new focal findings or movement disorder noted from baseline      BP Readings from Last 3 Encounters:   07/22/24 120/70   07/09/24 126/84   06/25/24 118/78     Wt Readings from Last 3 Encounters:   07/22/24 (!) 164.4 kg (362 lb 7 oz)   07/09/24 (!) 163.3 kg (360 lb)   06/25/24 (!) 166.3 kg (366 lb 10 oz)     /70 (BP Location: Left forearm, Patient Position: Sitting, BP Method: Medium (Manual))   Pulse 108   Temp 98.4 °F (36.9 °C) (Oral)   Ht 5' 6" (1.676 m)   Wt (!) 164.4 kg (362 lb 7 oz)   LMP 07/01/2024   SpO2 99%   BMI 58.50 kg/m²    274}  Laboratory: I have reviewed old labs below:    274}    Lab Results   Component Value Date    WBC 7.63 11/16/2023    HGB 12.4 11/16/2023    HCT 39.8 11/16/2023    MCV 92 11/16/2023     11/16/2023     11/16/2023    K 4.0 11/16/2023     11/16/2023    CALCIUM 9.4 11/16/2023    CO2 24 11/16/2023    GLU 72 11/16/2023    BUN 12 11/16/2023    CREATININE 0.6 11/16/2023    EGFRNORACEVR >60.0 11/16/2023    ANIONGAP 11 11/16/2023    PROT 7.7 11/16/2023    ALBUMIN 3.7 11/16/2023    BILITOT 0.4 11/16/2023    ALKPHOS 66 11/16/2023    ALT 19 11/16/2023    AST 21 11/16/2023    CHOL 149 11/16/2023    TRIG 59 11/16/2023    HDL 40 11/16/2023    LDLCALC 97.2 11/16/2023    TSH 1.825 11/16/2023    HGBA1C 5.3 06/23/2023      Lab " reviewed by me: Particular labs of significance that I will monitor, workup, or treat to improve are mentioned below in diagnostic impression remarks.    Imaging/EKG: I have reviewed the pertinent results and my findings are noted in remarks.  274}    CC:   Chief Complaint   Patient presents with    Follow-up        274}    Assessment/Plan  Petty Donato is a 34 y.o. female who presents to clinic with:  1. Class 3 severe obesity due to excess calories without serious comorbidity with body mass index (BMI) of 60.0 to 69.9 in adult       274}  Diagnostic Impression Remarks       34 y.o. female who presents to clinic today for follow up     This is the extent of this pleasant patient's concerns at this present time.  I also discussed the importance of close follow up to discuss labs, change or modify her medications if needed, monitor side effects, and further evaluation of medical problems.     Additional workup planned: see labs ordered below.    See below for labs and meds ordered with associated diagnosis      1. Class 3 severe obesity due to excess calories without serious comorbidity with body mass index (BMI) of 60.0 to 69.9 in adult  - phentermine (ADIPEX-P) 37.5 mg tablet; Take 1 tablet (37.5 mg total) by mouth before breakfast.  Dispense: 30 tablet; Refill: 0  Continue current plan         RTC 1 mo wt loss or prn     Vanesa William MD, [facility]   Family Medicine Physician  07/22/2024

## 2024-08-21 ENCOUNTER — OFFICE VISIT (OUTPATIENT)
Dept: FAMILY MEDICINE | Facility: CLINIC | Age: 34
End: 2024-08-21
Payer: COMMERCIAL

## 2024-08-21 VITALS
TEMPERATURE: 98 F | WEIGHT: 293 LBS | HEART RATE: 99 BPM | BODY MASS INDEX: 47.09 KG/M2 | SYSTOLIC BLOOD PRESSURE: 128 MMHG | DIASTOLIC BLOOD PRESSURE: 84 MMHG | HEIGHT: 66 IN | OXYGEN SATURATION: 98 %

## 2024-08-21 DIAGNOSIS — E66.01 CLASS 3 SEVERE OBESITY DUE TO EXCESS CALORIES WITHOUT SERIOUS COMORBIDITY WITH BODY MASS INDEX (BMI) OF 60.0 TO 69.9 IN ADULT: Primary | ICD-10-CM

## 2024-08-21 PROCEDURE — 99999 PR PBB SHADOW E&M-EST. PATIENT-LVL III: CPT | Mod: PBBFAC,,, | Performed by: STUDENT IN AN ORGANIZED HEALTH CARE EDUCATION/TRAINING PROGRAM

## 2024-08-21 PROCEDURE — 3074F SYST BP LT 130 MM HG: CPT | Mod: CPTII,S$GLB,, | Performed by: STUDENT IN AN ORGANIZED HEALTH CARE EDUCATION/TRAINING PROGRAM

## 2024-08-21 PROCEDURE — 3079F DIAST BP 80-89 MM HG: CPT | Mod: CPTII,S$GLB,, | Performed by: STUDENT IN AN ORGANIZED HEALTH CARE EDUCATION/TRAINING PROGRAM

## 2024-08-21 PROCEDURE — 3008F BODY MASS INDEX DOCD: CPT | Mod: CPTII,S$GLB,, | Performed by: STUDENT IN AN ORGANIZED HEALTH CARE EDUCATION/TRAINING PROGRAM

## 2024-08-21 PROCEDURE — 1160F RVW MEDS BY RX/DR IN RCRD: CPT | Mod: CPTII,S$GLB,, | Performed by: STUDENT IN AN ORGANIZED HEALTH CARE EDUCATION/TRAINING PROGRAM

## 2024-08-21 PROCEDURE — 1159F MED LIST DOCD IN RCRD: CPT | Mod: CPTII,S$GLB,, | Performed by: STUDENT IN AN ORGANIZED HEALTH CARE EDUCATION/TRAINING PROGRAM

## 2024-08-21 PROCEDURE — 99214 OFFICE O/P EST MOD 30 MIN: CPT | Mod: S$GLB,,, | Performed by: STUDENT IN AN ORGANIZED HEALTH CARE EDUCATION/TRAINING PROGRAM

## 2024-08-21 RX ORDER — PHENTERMINE HYDROCHLORIDE 37.5 MG/1
37.5 TABLET ORAL
Qty: 30 TABLET | Refills: 0 | Status: SHIPPED | OUTPATIENT
Start: 2024-08-21 | End: 2024-09-20

## 2024-08-21 NOTE — PROGRESS NOTES
[facility] Note    Subjective:  Chief Complaint:   Chief Complaint   Patient presents with    Follow-up       History of Present Illness:  Petty is here for routine follow up     BMI 63 > 58   Adipex 37.5   Side effects: none  LA : last fill 7/26/24 #30  Tested positive for COVID this month - did not take adipex last week   Flips schedule on Thursday so skips that day   Diet: Keep it Real meals as lunch substitution , goal of 180 g protein   Exercise: crossfit   Starting weight:  392  Current weight: 364  Goal weight: to feel healthy     Allergies:  Review of patient's allergies indicates:  No Known Allergies    Home Medications:  Current Outpatient Medications on File Prior to Visit   Medication Sig    meloxicam (MOBIC) 15 MG tablet Take 15 mg by mouth.    methocarbamoL (ROBAXIN) 500 MG Tab     nystatin (MYCOSTATIN) ointment Apply topically 2 (two) times daily.    valACYclovir (VALTREX) 500 MG tablet Take 1 tablet (500 mg total) by mouth once daily.    [DISCONTINUED] phentermine (ADIPEX-P) 37.5 mg tablet Take 1 tablet (37.5 mg total) by mouth before breakfast.     Current Facility-Administered Medications on File Prior to Visit   Medication    lactated ringers infusion       Past Medical History:   Diagnosis Date    Other closed displaced fracture of distal end of right humerus, initial encounter      Past Surgical History:   Procedure Laterality Date    OPEN REDUCTION AND INTERNAL FIXATION (ORIF) OF FRACTURE OF DISTAL HUMERUS Right 6/23/2023    Procedure: ORIF, FRACTURE, HUMERUS, DISTAL;  Surgeon: Merlin Lemus MD;  Location: [facility];  Service: Orthopedics;  Laterality: Right;    WISDOM TOOTH EXTRACTION       Family History   Problem Relation Name Age of Onset    No Known Problems Mother      Cancer Father          lung    Cerebral aneurysm Father       Social History     Tobacco Use    Smoking status: Never    Smokeless tobacco: Never   Substance Use Topics    Alcohol use: Not Currently     "Drug use: Never            The patient's past medical history, surgical history, social history, family history, allergies and medications have been reviewed.    Review of Systems     10 point review of systems was conducted and only the pertinent positives and pertinent negatives are noted above in the HPI section.    Physical Examination  General appearance: alert, cooperative, no distress  HEENT: normocephalic, atraumatic, PERRLA, TMs visualized bilaterally not impacted by cerumen, no nasal septal deviation, oropharynx mucosa pink and moist without tonsillar exudates  Neck: trachea midline, no LAD, no thyromegaly, no neck stiffness  Lungs: clear to auscultation, no wheezes, rales or rhonchi, symmetric air entry  Heart: normal rate, regular rhythm, normal S1, S2, no murmurs, rubs, clicks or gallops  Abdomen: soft, nontender, nondistended, no rigidity, rebound, or guarding.   Skin: No rashes or abnormal skin lesions, no apparent jaundice or bruising  Extremities: Full ROM of all extremities, peripheral pulses normal, no unilateral leg swelling or calf tenderness   Neurological:alert, oriented, normal speech, no new focal findings or movement disorder noted from baseline      BP Readings from Last 3 Encounters:   08/21/24 128/84   07/22/24 120/70   07/09/24 126/84     Wt Readings from Last 3 Encounters:   08/21/24 (!) 165.4 kg (364 lb 10.3 oz)   07/22/24 (!) 164.4 kg (362 lb 7 oz)   07/09/24 (!) 163.3 kg (360 lb)     /84 (BP Location: Left forearm, Patient Position: Sitting, BP Method: Large (Manual))   Pulse 99   Temp 98.4 °F (36.9 °C) (Oral)   Ht 5' 6" (1.676 m)   Wt (!) 165.4 kg (364 lb 10.3 oz)   SpO2 98%   BMI 58.85 kg/m²    274}  Laboratory: I have reviewed old labs below:    274}    Lab Results   Component Value Date    WBC 7.63 11/16/2023    HGB 12.4 11/16/2023    HCT 39.8 11/16/2023    MCV 92 11/16/2023     11/16/2023     11/16/2023    K 4.0 11/16/2023     11/16/2023    " CALCIUM 9.4 11/16/2023    CO2 24 11/16/2023    GLU 72 11/16/2023    BUN 12 11/16/2023    CREATININE 0.6 11/16/2023    EGFRNORACEVR >60.0 11/16/2023    ANIONGAP 11 11/16/2023    PROT 7.7 11/16/2023    ALBUMIN 3.7 11/16/2023    BILITOT 0.4 11/16/2023    ALKPHOS 66 11/16/2023    ALT 19 11/16/2023    AST 21 11/16/2023    CHOL 149 11/16/2023    TRIG 59 11/16/2023    HDL 40 11/16/2023    LDLCALC 97.2 11/16/2023    TSH 1.825 11/16/2023    HGBA1C 5.3 06/23/2023      Lab reviewed by me: Particular labs of significance that I will monitor, workup, or treat to improve are mentioned below in diagnostic impression remarks.    Imaging/EKG: I have reviewed the pertinent results and my findings are noted in remarks.  274}    CC:   Chief Complaint   Patient presents with    Follow-up        274}    Assessment/Plan  Petty Donato is a 34 y.o. female who presents to clinic with:  1. Class 3 severe obesity due to excess calories without serious comorbidity with body mass index (BMI) of 60.0 to 69.9 in adult       274}  Diagnostic Impression Remarks       34 y.o. female who presents to clinic today for follow up     This is the extent of this pleasant patient's concerns at this present time.  I also discussed the importance of close follow up to discuss labs, change or modify her medications if needed, monitor side effects, and further evaluation of medical problems.     Additional workup planned: see labs ordered below.    See below for labs and meds ordered with associated diagnosis      1. Class 3 severe obesity due to excess calories without serious comorbidity with body mass index (BMI) of 60.0 to 69.9 in adult  - phentermine (ADIPEX-P) 37.5 mg tablet; Take 1 tablet (37.5 mg total) by mouth before breakfast.  Dispense: 30 tablet; Refill: 0  Plateau 2/2 covid infection . Continue current regimen         RTC monthly or prn     Vanesa William MD, [facility]   Family Medicine Physician  08/21/2024

## 2024-10-28 ENCOUNTER — PATIENT MESSAGE (OUTPATIENT)
Dept: FAMILY MEDICINE | Facility: CLINIC | Age: 34
End: 2024-10-28
Payer: COMMERCIAL

## 2024-11-06 ENCOUNTER — LAB VISIT (OUTPATIENT)
Dept: LAB | Facility: HOSPITAL | Age: 34
End: 2024-11-06
Attending: NURSE PRACTITIONER
Payer: COMMERCIAL

## 2024-11-06 ENCOUNTER — OFFICE VISIT (OUTPATIENT)
Dept: FAMILY MEDICINE | Facility: CLINIC | Age: 34
End: 2024-11-06
Payer: COMMERCIAL

## 2024-11-06 VITALS
BODY MASS INDEX: 47.09 KG/M2 | DIASTOLIC BLOOD PRESSURE: 70 MMHG | HEIGHT: 66 IN | HEART RATE: 70 BPM | WEIGHT: 293 LBS | SYSTOLIC BLOOD PRESSURE: 130 MMHG | OXYGEN SATURATION: 98 %

## 2024-11-06 DIAGNOSIS — E66.01 CLASS 3 SEVERE OBESITY DUE TO EXCESS CALORIES WITHOUT SERIOUS COMORBIDITY WITH BODY MASS INDEX (BMI) OF 60.0 TO 69.9 IN ADULT: ICD-10-CM

## 2024-11-06 DIAGNOSIS — E55.9 VITAMIN D DEFICIENCY: ICD-10-CM

## 2024-11-06 DIAGNOSIS — E66.813 CLASS 3 SEVERE OBESITY DUE TO EXCESS CALORIES WITHOUT SERIOUS COMORBIDITY WITH BODY MASS INDEX (BMI) OF 60.0 TO 69.9 IN ADULT: ICD-10-CM

## 2024-11-06 DIAGNOSIS — Z13.6 ENCOUNTER FOR LIPID SCREENING FOR CARDIOVASCULAR DISEASE: ICD-10-CM

## 2024-11-06 DIAGNOSIS — Z13.1 SCREENING FOR DIABETES MELLITUS (DM): ICD-10-CM

## 2024-11-06 DIAGNOSIS — Z76.89 ENCOUNTER TO ESTABLISH CARE: ICD-10-CM

## 2024-11-06 DIAGNOSIS — Z00.00 WELL ADULT EXAM: Primary | ICD-10-CM

## 2024-11-06 DIAGNOSIS — Z13.220 ENCOUNTER FOR LIPID SCREENING FOR CARDIOVASCULAR DISEASE: ICD-10-CM

## 2024-11-06 DIAGNOSIS — Z00.00 WELL ADULT EXAM: ICD-10-CM

## 2024-11-06 LAB
25(OH)D3+25(OH)D2 SERPL-MCNC: 15 NG/ML (ref 30–96)
ALBUMIN SERPL BCP-MCNC: 3.2 G/DL (ref 3.5–5.2)
ALP SERPL-CCNC: 81 U/L (ref 40–150)
ALT SERPL W/O P-5'-P-CCNC: 12 U/L (ref 10–44)
ANION GAP SERPL CALC-SCNC: 9 MMOL/L (ref 8–16)
AST SERPL-CCNC: 12 U/L (ref 10–40)
BASOPHILS # BLD AUTO: 0.03 K/UL (ref 0–0.2)
BASOPHILS NFR BLD: 0.4 % (ref 0–1.9)
BILIRUB SERPL-MCNC: 0.4 MG/DL (ref 0.1–1)
BUN SERPL-MCNC: 13 MG/DL (ref 6–20)
CALCIUM SERPL-MCNC: 8.9 MG/DL (ref 8.7–10.5)
CHLORIDE SERPL-SCNC: 108 MMOL/L (ref 95–110)
CHOLEST SERPL-MCNC: 148 MG/DL (ref 120–199)
CHOLEST/HDLC SERPL: 3.5 {RATIO} (ref 2–5)
CO2 SERPL-SCNC: 22 MMOL/L (ref 23–29)
CREAT SERPL-MCNC: 0.7 MG/DL (ref 0.5–1.4)
DIFFERENTIAL METHOD BLD: NORMAL
EOSINOPHIL # BLD AUTO: 0.2 K/UL (ref 0–0.5)
EOSINOPHIL NFR BLD: 2.8 % (ref 0–8)
ERYTHROCYTE [DISTWIDTH] IN BLOOD BY AUTOMATED COUNT: 13.7 % (ref 11.5–14.5)
EST. GFR  (NO RACE VARIABLE): >60 ML/MIN/1.73 M^2
ESTIMATED AVG GLUCOSE: 100 MG/DL (ref 68–131)
GLUCOSE SERPL-MCNC: 92 MG/DL (ref 70–110)
HBA1C MFR BLD: 5.1 % (ref 4–5.6)
HCT VFR BLD AUTO: 37.3 % (ref 37–48.5)
HDLC SERPL-MCNC: 42 MG/DL (ref 40–75)
HDLC SERPL: 28.4 % (ref 20–50)
HGB BLD-MCNC: 12.2 G/DL (ref 12–16)
IMM GRANULOCYTES # BLD AUTO: 0.03 K/UL (ref 0–0.04)
IMM GRANULOCYTES NFR BLD AUTO: 0.4 % (ref 0–0.5)
LDLC SERPL CALC-MCNC: 96 MG/DL (ref 63–159)
LYMPHOCYTES # BLD AUTO: 2.1 K/UL (ref 1–4.8)
LYMPHOCYTES NFR BLD: 23.9 % (ref 18–48)
MCH RBC QN AUTO: 30.1 PG (ref 27–31)
MCHC RBC AUTO-ENTMCNC: 32.7 G/DL (ref 32–36)
MCV RBC AUTO: 92 FL (ref 82–98)
MONOCYTES # BLD AUTO: 0.7 K/UL (ref 0.3–1)
MONOCYTES NFR BLD: 8.3 % (ref 4–15)
NEUTROPHILS # BLD AUTO: 5.5 K/UL (ref 1.8–7.7)
NEUTROPHILS NFR BLD: 64.2 % (ref 38–73)
NONHDLC SERPL-MCNC: 106 MG/DL
NRBC BLD-RTO: 0 /100 WBC
PLATELET # BLD AUTO: 249 K/UL (ref 150–450)
PMV BLD AUTO: 10.3 FL (ref 9.2–12.9)
POTASSIUM SERPL-SCNC: 4.2 MMOL/L (ref 3.5–5.1)
PROT SERPL-MCNC: 7.1 G/DL (ref 6–8.4)
RBC # BLD AUTO: 4.05 M/UL (ref 4–5.4)
SODIUM SERPL-SCNC: 139 MMOL/L (ref 136–145)
TRIGL SERPL-MCNC: 50 MG/DL (ref 30–150)
TSH SERPL DL<=0.005 MIU/L-ACNC: 1.36 UIU/ML (ref 0.4–4)
WBC # BLD AUTO: 8.56 K/UL (ref 3.9–12.7)

## 2024-11-06 PROCEDURE — 99999 PR PBB SHADOW E&M-EST. PATIENT-LVL III: CPT | Mod: PBBFAC,,, | Performed by: NURSE PRACTITIONER

## 2024-11-06 PROCEDURE — 3078F DIAST BP <80 MM HG: CPT | Mod: S$GLB,,, | Performed by: NURSE PRACTITIONER

## 2024-11-06 PROCEDURE — 83036 HEMOGLOBIN GLYCOSYLATED A1C: CPT | Performed by: NURSE PRACTITIONER

## 2024-11-06 PROCEDURE — 3075F SYST BP GE 130 - 139MM HG: CPT | Mod: S$GLB,,, | Performed by: NURSE PRACTITIONER

## 2024-11-06 PROCEDURE — 1159F MED LIST DOCD IN RCRD: CPT | Mod: S$GLB,,, | Performed by: NURSE PRACTITIONER

## 2024-11-06 PROCEDURE — 80053 COMPREHEN METABOLIC PANEL: CPT | Performed by: NURSE PRACTITIONER

## 2024-11-06 PROCEDURE — 99395 PREV VISIT EST AGE 18-39: CPT | Mod: S$GLB,,, | Performed by: NURSE PRACTITIONER

## 2024-11-06 PROCEDURE — 3008F BODY MASS INDEX DOCD: CPT | Mod: S$GLB,,, | Performed by: NURSE PRACTITIONER

## 2024-11-06 PROCEDURE — 84443 ASSAY THYROID STIM HORMONE: CPT | Performed by: NURSE PRACTITIONER

## 2024-11-06 PROCEDURE — 80061 LIPID PANEL: CPT | Performed by: NURSE PRACTITIONER

## 2024-11-06 PROCEDURE — 1160F RVW MEDS BY RX/DR IN RCRD: CPT | Mod: S$GLB,,, | Performed by: NURSE PRACTITIONER

## 2024-11-06 PROCEDURE — 36415 COLL VENOUS BLD VENIPUNCTURE: CPT | Performed by: NURSE PRACTITIONER

## 2024-11-06 PROCEDURE — 3044F HG A1C LEVEL LT 7.0%: CPT | Mod: S$GLB,,, | Performed by: NURSE PRACTITIONER

## 2024-11-06 PROCEDURE — 82306 VITAMIN D 25 HYDROXY: CPT | Performed by: NURSE PRACTITIONER

## 2024-11-06 PROCEDURE — 85025 COMPLETE CBC W/AUTO DIFF WBC: CPT | Performed by: NURSE PRACTITIONER

## 2024-11-06 NOTE — PROGRESS NOTES
Subjective:       Patient ID: Petty Donato is a 34 y.o. female.    Chief Complaint: Establish Care      Past Medical History:   Diagnosis Date    Herpes genitalia 2022    Other closed displaced fracture of distal end of right humerus, initial encounter 2023    Psoriasis 2019       Past Surgical History:   Procedure Laterality Date    OPEN REDUCTION AND INTERNAL FIXATION (ORIF) OF FRACTURE OF DISTAL HUMERUS Right 6/23/2023    Procedure: ORIF, FRACTURE, HUMERUS, DISTAL;  Surgeon: Merlin Lemus MD;  Location: [facility];  Service: Orthopedics;  Laterality: Right;    WISDOM TOOTH EXTRACTION          Social History     Socioeconomic History    Marital status: Single    Number of children: 0    Highest education level: GED or equivalent   Occupational History    Occupation: [employer]- / overnight   Tobacco Use    Smoking status: Never    Smokeless tobacco: Never   Substance and Sexual Activity    Alcohol use: Not Currently    Drug use: Never    Sexual activity: Yes     Partners: Male     Birth control/protection: None     Social Drivers of Health     Financial Resource Strain: Medium Risk (11/3/2024)    Overall Financial Resource Strain (CARDIA)     Difficulty of Paying Living Expenses: Somewhat hard   Food Insecurity: Food Insecurity Present (11/3/2024)    Hunger Vital Sign     Worried About Running Out of Food in the Last Year: Sometimes true     Ran Out of Food in the Last Year: Sometimes true   Physical Activity: Sufficiently Active (11/3/2024)    Exercise Vital Sign     Days of Exercise per Week: 4 days     Minutes of Exercise per Session: 40 min   Stress: No Stress Concern Present (11/3/2024)    [employer] - Occupational Stress Questionnaire     Feeling of Stress : Not at all   Housing Stability: Unknown (11/3/2024)    Housing Stability Vital Sign     Unable to Pay for Housing in the Last Year: No       Family History   Problem Relation Name Age of Onset    No  Known Problems Mother          never been to doctor    Cerebral aneurysm Father      Lung cancer Father      Obesity Sister      Hyperlipidemia Half-brother      Asthma Half-brother         Review of patient's allergies indicates:  No Known Allergies       Current Outpatient Medications:     ketoconazole (NIZORAL) 2 % shampoo, Apply topically twice a week. Leave on for 5 minutes prior to washing off, Disp: 120 mL, Rfl: 2    meloxicam (MOBIC) 15 MG tablet, Take 15 mg by mouth., Disp: , Rfl:     methocarbamoL (ROBAXIN) 500 MG Tab, , Disp: , Rfl:     nystatin (MYCOSTATIN) ointment, Apply topically 2 (two) times daily., Disp: 30 g, Rfl: 1    nystatin (MYCOSTATIN) powder, Apply topically 2 (two) times daily as needed (rash). Use as needed for prophylaxis once rash cleared by ointment, Disp: 30 g, Rfl: 2    triamcinolone acetonide 0.1% (KENALOG) 0.1 % ointment, Apply topically 2 (two) times daily., Disp: 30 g, Rfl: 1    valACYclovir (VALTREX) 500 MG tablet, Take 1 tablet (500 mg total) by mouth once daily., Disp: 90 tablet, Rfl: 3  No current facility-administered medications for this visit.    Facility-Administered Medications Ordered in Other Visits:     lactated ringers infusion, , Intravenous, Continuous, Bee Redmond MD, Last Rate: 20 mL/hr at 06/23/23 1016, Restarted at 06/23/23 1033    HPI  Review of Systems   Constitutional: Negative.    HENT: Negative.     Eyes: Negative.    Respiratory: Negative.     Cardiovascular: Negative.    Gastrointestinal: Negative.    Endocrine: Negative.    Genitourinary: Negative.    Musculoskeletal: Negative.    Skin: Negative.    Allergic/Immunologic: Negative.    Neurological: Negative.    Hematological: Negative.    Psychiatric/Behavioral: Negative.               Objective:      Physical Exam  Vitals and nursing note reviewed.   Constitutional:       General: She is not in acute distress.     Appearance: Normal appearance. She is well-developed. She is obese. She is not  ill-appearing.   HENT:      Head: Normocephalic.   Eyes:      Conjunctiva/sclera: Conjunctivae normal.   Neck:      Thyroid: No thyromegaly.   Cardiovascular:      Rate and Rhythm: Normal rate and regular rhythm.      Heart sounds: Normal heart sounds. No murmur heard.  Pulmonary:      Effort: Pulmonary effort is normal.      Breath sounds: Normal breath sounds. No wheezing or rales.   Musculoskeletal:         General: Normal range of motion.      Cervical back: Normal range of motion.      Right lower leg: No edema.      Left lower leg: No edema.   Skin:     General: Skin is warm and dry.   Neurological:      Mental Status: She is alert and oriented to person, place, and time. Mental status is at baseline.   Psychiatric:         Mood and Affect: Mood normal.         Behavior: Behavior normal.         Thought Content: Thought content normal.         Judgment: Judgment normal.         Assessment:      1. Well adult exam    2. Encounter to establish care    3. Class 3 severe obesity due to excess calories without serious comorbidity with body mass index (BMI) of 60.0 to 69.9 in adult    4. Screening for diabetes mellitus (DM)    5. Vitamin D deficiency    6. Encounter for lipid screening for cardiovascular disease        Plan:    1- no GLP-1 covered  2- rec 1200 neela per day diet, etc  3- fasting wellness labs today and RTC 1 yr for wellness    -       Well adult exam  -     Hemoglobin A1C; Future; Expected date: 11/06/2024  -     Lipid Panel; Future; Expected date: 11/06/2024  -     CBC Auto Differential; Future; Expected date: 11/06/2024  -     Comprehensive Metabolic Panel; Future; Expected date: 11/06/2024  -     TSH; Future; Expected date: 11/06/2024  -     Vitamin D; Future; Expected date: 11/06/2024    Encounter to establish care    Class 3 severe obesity due to excess calories without serious comorbidity with body mass index (BMI) of 60.0 to 69.9 in adult  -     Hemoglobin A1C; Future; Expected date:  11/06/2024  -     Lipid Panel; Future; Expected date: 11/06/2024  -     CBC Auto Differential; Future; Expected date: 11/06/2024  -     Comprehensive Metabolic Panel; Future; Expected date: 11/06/2024  -     TSH; Future; Expected date: 11/06/2024  -     Vitamin D; Future; Expected date: 11/06/2024    Screening for diabetes mellitus (DM)    Vitamin D deficiency  -     Hemoglobin A1C; Future; Expected date: 11/06/2024  -     Lipid Panel; Future; Expected date: 11/06/2024  -     CBC Auto Differential; Future; Expected date: 11/06/2024  -     Comprehensive Metabolic Panel; Future; Expected date: 11/06/2024  -     TSH; Future; Expected date: 11/06/2024  -     Vitamin D; Future; Expected date: 11/06/2024    Encounter for lipid screening for cardiovascular disease  -     Hemoglobin A1C; Future; Expected date: 11/06/2024  -     Lipid Panel; Future; Expected date: 11/06/2024  -     CBC Auto Differential; Future; Expected date: 11/06/2024  -     Comprehensive Metabolic Panel; Future; Expected date: 11/06/2024  -     TSH; Future; Expected date: 11/06/2024  -     Vitamin D; Future; Expected date: 11/06/2024        Risks, benefits, and side effects were discussed with the patient. All questions were answered to the fullest satisfaction of the patient, and pt verbalized understanding and agreement to treatment plan. Pt was to call with any new or worsening symptoms, or present to the ER.        This note was generated with the assistance of ambient listening technology. Verbal consent was obtained by the patient and accompanying visitor(s) for the recording of patient appointment to facilitate this note. I attest to having reviewed and edited the generated note for accuracy, though some syntax or spelling errors may persist. Please contact the author of this note for any clarification.

## 2024-11-08 ENCOUNTER — PATIENT MESSAGE (OUTPATIENT)
Dept: FAMILY MEDICINE | Facility: CLINIC | Age: 34
End: 2024-11-08
Payer: COMMERCIAL

## 2024-11-08 RX ORDER — CHOLECALCIFEROL (VITAMIN D3) 1250 MCG
1250 TABLET ORAL
Qty: 24 TABLET | Refills: 1 | Status: SHIPPED | OUTPATIENT
Start: 2024-11-11

## 2024-11-08 NOTE — PROGRESS NOTES
"Subjective:       Patient ID: Petty Donato is a 34 y.o. female.    Chief Complaint:   History of Present Illness    CHIEF COMPLAINT:  Petty presents today to establish care and discuss weight management options.    WEIGHT MANAGEMENT:  She reports a history of weight management interventions, including previous use of phentermine prescribed by Dr. William and tirzepatide injections at [facility], which were discontinued due to high cost ($800-$900 per month). She expresses interest in FDA-approved weight loss injections but found they are not covered by her insurance. She plans to undergo bariatric surgery in [place] next year at a facility her sister recommends as the "number one bariatric center." She was previously approved for this surgery but had to postpone due to an arm injury requiring surgery.    MEDICAL HISTORY:  She was diagnosed with psoriasis around  and herpes in . She sustained a right arm fracture in 2023.    FAMILY HISTORY:  Her mother is alive with unknown health status. Her father is  due to a brain aneurysm, with a history of lung cancer. She has one biological sister who recently underwent gastric bypass surgery and is doing well. She has a half-brother with multiple health issues including asthma, hyperlipidemia, and various allergies. Her paternal grandmother has a thyroid disorder and takes thyroid medication. Another paternal grandmother  of unspecified cancer. She denies known family history of colon or breast cancer.    MEDICATIONS:  She takes Valtrex daily for herpes management. She uses Kenalog cream and nystatin cream for yeast infections and scalp issues related to psoriasis. She also uses Nizoral shampoo for her scalp.    SOCIAL HISTORY:  She works as a  and  overnight at [employer]. She is single with no children. Her highest level of education is a GED. She denies smoking, alcohol use, and history of drug use. She reports sexual " preference for male partners and is not currently using birth control.    PREVENTIVE CARE:  She reports her Pap smear is up to date. Her last labs and thyroid check were performed approximately one year ago. She is unsure of the date of her last tetanus vaccine.      ROS:  General: -fever, -chills, -fatigue, -weight gain, -weight loss  Eyes: -vision changes, -redness, -discharge  ENT: -ear pain, -nasal congestion, -sore throat  Cardiovascular: -chest pain, -palpitations, -lower extremity edema  Respiratory: -cough, -shortness of breath  Gastrointestinal: -abdominal pain, -nausea, -vomiting, -diarrhea, -constipation, -blood in stool  Genitourinary: -dysuria, -hematuria, -frequency  Musculoskeletal: -joint pain, -muscle pain  Skin: -rash, -lesion  Neurological: -headache, -dizziness, -numbness, -tingling  Psychiatric: -anxiety, -depression, -sleep difficulty        Past Medical History:   Diagnosis Date    Herpes genitalia 2022    Other closed displaced fracture of distal end of right humerus, initial encounter 2023    Psoriasis 2019       Past Surgical History:   Procedure Laterality Date    OPEN REDUCTION AND INTERNAL FIXATION (ORIF) OF FRACTURE OF DISTAL HUMERUS Right 6/23/2023    Procedure: ORIF, FRACTURE, HUMERUS, DISTAL;  Surgeon: Merlin Lemus MD;  Location: [facility];  Service: Orthopedics;  Laterality: Right;    WISDOM TOOTH EXTRACTION          Social History     Socioeconomic History    Marital status: Single    Number of children: 0    Highest education level: GED or equivalent   Occupational History    Occupation: [employer]- / overnight   Tobacco Use    Smoking status: Never    Smokeless tobacco: Never   Substance and Sexual Activity    Alcohol use: Not Currently    Drug use: Never    Sexual activity: Yes     Partners: Male     Birth control/protection: None     Social Drivers of Health     Financial Resource Strain: Medium Risk (11/3/2024)    Overall Financial Resource Strain (CARDIA)      Difficulty of Paying Living Expenses: Somewhat hard   Food Insecurity: Food Insecurity Present (11/3/2024)    Hunger Vital Sign     Worried About Running Out of Food in the Last Year: Sometimes true     Ran Out of Food in the Last Year: Sometimes true   Physical Activity: Sufficiently Active (11/3/2024)    Exercise Vital Sign     Days of Exercise per Week: 4 days     Minutes of Exercise per Session: 40 min   Stress: No Stress Concern Present (11/3/2024)    [employer] - Occupational Stress Questionnaire     Feeling of Stress : Not at all   Housing Stability: Unknown (11/3/2024)    Housing Stability Vital Sign     Unable to Pay for Housing in the Last Year: No       Family History   Problem Relation Name Age of Onset    No Known Problems Mother          never been to doctor    Cerebral aneurysm Father      Lung cancer Father      Obesity Sister      Hyperlipidemia Half-brother      Asthma Half-brother         Review of patient's allergies indicates:  No Known Allergies       Current Outpatient Medications:     ketoconazole (NIZORAL) 2 % shampoo, Apply topically twice a week. Leave on for 5 minutes prior to washing off, Disp: 120 mL, Rfl: 2    meloxicam (MOBIC) 15 MG tablet, Take 15 mg by mouth., Disp: , Rfl:     methocarbamoL (ROBAXIN) 500 MG Tab, , Disp: , Rfl:     nystatin (MYCOSTATIN) ointment, Apply topically 2 (two) times daily., Disp: 30 g, Rfl: 1    nystatin (MYCOSTATIN) powder, Apply topically 2 (two) times daily as needed (rash). Use as needed for prophylaxis once rash cleared by ointment, Disp: 30 g, Rfl: 2    triamcinolone acetonide 0.1% (KENALOG) 0.1 % ointment, Apply topically 2 (two) times daily., Disp: 30 g, Rfl: 1    valACYclovir (VALTREX) 500 MG tablet, Take 1 tablet (500 mg total) by mouth once daily., Disp: 90 tablet, Rfl: 3  No current facility-administered medications for this visit.    Facility-Administered Medications Ordered in Other Visits:     lactated  ringers infusion, , Intravenous, Continuous, Bee Redmond MD, Last Rate: 20 mL/hr at 06/23/23 1016, Restarted at 06/23/23 1033    HPI  Review of Systems   Constitutional: Negative.    HENT: Negative.     Eyes: Negative.    Respiratory: Negative.     Cardiovascular: Negative.    Gastrointestinal: Negative.    Endocrine: Negative.    Genitourinary: Negative.    Musculoskeletal: Negative.    Skin: Negative.    Allergic/Immunologic: Negative.    Neurological: Negative.    Hematological: Negative.    Psychiatric/Behavioral: Negative.               Objective:      Physical Exam  Vitals and nursing note reviewed.   Constitutional:       General: She is not in acute distress.     Appearance: Normal appearance. She is well-developed. She is obese. She is not ill-appearing.   HENT:      Head: Normocephalic.   Eyes:      Conjunctiva/sclera: Conjunctivae normal.   Neck:      Thyroid: No thyromegaly.   Cardiovascular:      Rate and Rhythm: Normal rate and regular rhythm.      Heart sounds: Normal heart sounds. No murmur heard.  Pulmonary:      Effort: Pulmonary effort is normal.      Breath sounds: Normal breath sounds. No wheezing or rales.   Musculoskeletal:         General: Normal range of motion.      Cervical back: Normal range of motion.      Right lower leg: No edema.      Left lower leg: No edema.   Skin:     General: Skin is warm and dry.   Neurological:      Mental Status: She is alert and oriented to person, place, and time. Mental status is at baseline.   Psychiatric:         Mood and Affect: Mood normal.         Behavior: Behavior normal.         Thought Content: Thought content normal.         Judgment: Judgment normal.         Assessment:      1. Well adult exam    2. Encounter to establish care    3. Class 3 severe obesity due to excess calories without serious comorbidity with body mass index (BMI) of 60.0 to 69.9 in adult    4. Screening for diabetes mellitus (DM)    5. Vitamin D deficiency    6.  Encounter for lipid screening for cardiovascular disease        Plan:    1- no GLP-1 covered  2- rec 1200 neela per day diet, etc  3- fasting wellness labs today and RTC 1 yr for wellness  --------------------------  Hi, your thyroid, kidneys, liver, blood count, immune system, electrolytes, and cholesterol were all essentially normal. Your vitamin D level is very low so I have sent 50,000 iu to take twice weekly for 6 mo then retest your level. Send me a message in 6 mo when you are about finished. ThanksJeannie adult exam  -     Hemoglobin A1C; Future; Expected date: 11/06/2024  -     Lipid Panel; Future; Expected date: 11/06/2024  -     CBC Auto Differential; Future; Expected date: 11/06/2024  -     Comprehensive Metabolic Panel; Future; Expected date: 11/06/2024  -     TSH; Future; Expected date: 11/06/2024  -     Vitamin D; Future; Expected date: 11/06/2024    Encounter to establish care    Class 3 severe obesity due to excess calories without serious comorbidity with body mass index (BMI) of 60.0 to 69.9 in adult  -     Hemoglobin A1C; Future; Expected date: 11/06/2024  -     Lipid Panel; Future; Expected date: 11/06/2024  -     CBC Auto Differential; Future; Expected date: 11/06/2024  -     Comprehensive Metabolic Panel; Future; Expected date: 11/06/2024  -     TSH; Future; Expected date: 11/06/2024  -     Vitamin D; Future; Expected date: 11/06/2024    Screening for diabetes mellitus (DM)    Vitamin D deficiency  -     Hemoglobin A1C; Future; Expected date: 11/06/2024  -     Lipid Panel; Future; Expected date: 11/06/2024  -     CBC Auto Differential; Future; Expected date: 11/06/2024  -     Comprehensive Metabolic Panel; Future; Expected date: 11/06/2024  -     TSH; Future; Expected date: 11/06/2024  -     Vitamin D; Future; Expected date: 11/06/2024    Encounter for lipid screening for cardiovascular disease  -     Hemoglobin A1C; Future; Expected date: 11/06/2024  -     Lipid Panel; Future; Expected  date: 11/06/2024  -     CBC Auto Differential; Future; Expected date: 11/06/2024  -     Comprehensive Metabolic Panel; Future; Expected date: 11/06/2024  -     TSH; Future; Expected date: 11/06/2024  -     Vitamin D; Future; Expected date: 11/06/2024        Risks, benefits, and side effects were discussed with the patient. All questions were answered to the fullest satisfaction of the patient, and pt verbalized understanding and agreement to treatment plan. Pt was to call with any new or worsening symptoms, or present to the ER.        This note was generated with the assistance of ambient listening technology. Verbal consent was obtained by the patient and accompanying visitor(s) for the recording of patient appointment to facilitate this note. I attest to having reviewed and edited the generated note for accuracy, though some syntax or spelling errors may persist. Please contact the author of this note for any clarification.

## 2024-12-04 ENCOUNTER — OFFICE VISIT (OUTPATIENT)
Dept: PODIATRY | Facility: CLINIC | Age: 34
End: 2024-12-04
Payer: COMMERCIAL

## 2024-12-04 VITALS — HEIGHT: 66 IN | BODY MASS INDEX: 47.09 KG/M2 | HEART RATE: 68 BPM | WEIGHT: 293 LBS | RESPIRATION RATE: 18 BRPM

## 2024-12-04 DIAGNOSIS — M72.2 PLANTAR FASCIITIS: Primary | ICD-10-CM

## 2024-12-04 DIAGNOSIS — M79.671 RIGHT FOOT PAIN: ICD-10-CM

## 2024-12-04 PROCEDURE — 3044F HG A1C LEVEL LT 7.0%: CPT | Mod: CPTII,S$GLB,, | Performed by: PODIATRIST

## 2024-12-04 PROCEDURE — 99203 OFFICE O/P NEW LOW 30 MIN: CPT | Mod: S$GLB,,, | Performed by: PODIATRIST

## 2024-12-04 PROCEDURE — 3008F BODY MASS INDEX DOCD: CPT | Mod: CPTII,S$GLB,, | Performed by: PODIATRIST

## 2024-12-04 PROCEDURE — 99999 PR PBB SHADOW E&M-EST. PATIENT-LVL IV: CPT | Mod: PBBFAC,,, | Performed by: PODIATRIST

## 2024-12-04 PROCEDURE — 1159F MED LIST DOCD IN RCRD: CPT | Mod: CPTII,S$GLB,, | Performed by: PODIATRIST

## 2024-12-04 PROCEDURE — 1160F RVW MEDS BY RX/DR IN RCRD: CPT | Mod: CPTII,S$GLB,, | Performed by: PODIATRIST

## 2024-12-04 RX ORDER — METHYLPREDNISOLONE 4 MG/1
TABLET ORAL
Qty: 21 EACH | Refills: 0 | Status: SHIPPED | OUTPATIENT
Start: 2024-12-04 | End: 2024-12-25

## 2024-12-04 NOTE — PROGRESS NOTES
"  [address] [facility] [address] [place] [address] [place] [address]  Phone: [phone]   [phone]    Patient's PCP:Abby Guaman NP  Referring Provider: Hayley Self    Subjective:      Chief Complaint:: Foot Pain (Right foot pain)    HPI  Petty Donato is a 34 y.o. female who presents today with a complaint of right foot heel. The current episode started two week ago intermittent pain for two years.  The symptoms include aching to sharp throbbing. Probable cause of complaint unknown.  The symptoms are aggravated by walking weightbearing first steps in the morning . The problem has worsened. Treatment to date have included insoles, roller ball massage, ice, compression sock, calf stretcher which provided some relief.       Vitals:    12/04/24 0944   Pulse: 68   Resp: 18   Weight: (!) 170 kg (374 lb 12.5 oz)   Height: 5' 6" (1.676 m)   PainSc:   6      Shoe Size:     Past Surgical History:   Procedure Laterality Date    OPEN REDUCTION AND INTERNAL FIXATION (ORIF) OF FRACTURE OF DISTAL HUMERUS Right 6/23/2023    Procedure: ORIF, FRACTURE, HUMERUS, DISTAL;  Surgeon: Merlin Lemus MD;  Location: [facility];  Service: Orthopedics;  Laterality: Right;    WISDOM TOOTH EXTRACTION       Past Medical History:   Diagnosis Date    Herpes genitalia 2022    Other closed displaced fracture of distal end of right humerus, initial encounter 2023    Psoriasis 2019     Family History   Problem Relation Name Age of Onset    No Known Problems Mother          never been to doctor    Cerebral aneurysm Father      Lung cancer Father      Obesity Sister      Hyperlipidemia Half-brother      Asthma Half-brother          Social History:   Marital Status: Single  Alcohol History:  reports that she does not currently use alcohol.  Tobacco History:  reports that she has never smoked. She has never used smokeless tobacco.  Drug History:  reports no history of drug use.    Review of patient's allergies indicates:  No Known Allergies  "   Current Outpatient Medications   Medication Sig Dispense Refill    cholecalciferol, vitamin D3, 1,250 mcg (50,000 unit) Tab Take 1,250 mcg by mouth twice a week. 24 tablet 1    desogestreL-ethinyl estradioL (APRI) 0.15-0.03 mg per tablet Take 1 tablet by mouth once daily. 90 tablet 3    ketoconazole (NIZORAL) 2 % shampoo Apply topically twice a week. Leave on for 5 minutes prior to washing off 120 mL 2    meloxicam (MOBIC) 15 MG tablet Take 15 mg by mouth.      methocarbamoL (ROBAXIN) 500 MG Tab       methylPREDNISolone (MEDROL DOSEPACK) 4 mg tablet use as directed 21 each 0    metroNIDAZOLE (FLAGYL) 500 MG tablet Take 1 tablet (500 mg total) by mouth every 12 (twelve) hours. for 7 days 14 tablet 0    nystatin (MYCOSTATIN) ointment Apply topically 2 (two) times daily. 30 g 1    nystatin (MYCOSTATIN) powder Apply topically 2 (two) times daily as needed (rash). Use as needed for prophylaxis once rash cleared by ointment 30 g 2    phentermine (ADIPEX-P) 37.5 mg tablet Take 37.5 mg by mouth once daily.      sulfamethoxazole-trimethoprim 800-160mg (BACTRIM DS) 800-160 mg Tab Take 1 tablet by mouth 2 (two) times daily.      triamcinolone acetonide 0.1% (KENALOG) 0.1 % ointment Apply topically 2 (two) times daily. 30 g 1    valACYclovir (VALTREX) 500 MG tablet Take 1 tablet (500 mg total) by mouth once daily. 90 tablet 3     No current facility-administered medications for this visit.     Facility-Administered Medications Ordered in Other Visits   Medication Dose Route Frequency Provider Last Rate Last Admin    lactated ringers infusion   Intravenous Continuous Bee Redmond MD 20 mL/hr at 06/23/23 1016 Restarted at 06/23/23 1033       Review of Systems   Constitutional:  Negative for chills, fatigue, fever and unexpected weight change.   HENT:  Negative for hearing loss and trouble swallowing.    Eyes:  Negative for photophobia and visual disturbance.   Respiratory:  Negative for cough, shortness of breath and  wheezing.    Cardiovascular:  Negative for chest pain, palpitations and leg swelling.   Gastrointestinal:  Negative for abdominal pain and nausea.   Genitourinary:  Negative for dysuria and frequency.   Musculoskeletal:  Negative for arthralgias, back pain, gait problem, joint swelling and myalgias.   Skin:  Negative for rash.   Neurological:  Negative for tremors, seizures, speech difficulty, weakness and headaches.   Hematological:  Does not bruise/bleed easily.         Objective:        Physical Exam:   Foot Exam    General  Orientation: alert and oriented to person, place, and time   Affect: appropriate   Gait: unimpaired       Right Foot/Ankle     Inspection and Palpation  Ecchymosis: none  Tenderness: plantar fascia   Swelling: none   Arch: normal  Skin Exam: callus; no drainage, no ulcer and no erythema   Neurovascular  Dorsalis pedis: 2+  Posterior tibial: 2+  Capillary Refill: 2+  Varicose veins: not present  Saphenous nerve sensation: normal  Tibial nerve sensation: normal  Superficial peroneal nerve sensation: normal  Deep peroneal nerve sensation: normal  Sural nerve sensation: normal    Edema  Type of edema: non-pitting    Muscle Strength  Ankle dorsiflexion: 5  Ankle plantar flexion: 5  Ankle inversion: 5  Ankle eversion: 5  Great toe extension: 5  Great toe flexion: 5    Range of Motion    Passive  Ankle dorsiflexion: 5      Tests  Anterior drawer: negative   Talar tilt: negative   PT Tinel's sign: negative    Paresthesia: negative  Comments  Pain on palpation of the central plantar heel and lateral heel. No pain present  with side to side compression of the calcaneus. Negative tinnel's sign  at the tarsal tunnel. Negative Acosta's nerve pain. Negative Calcaneal nerve pain. No soft tissue masses. Pain absent  with dorsiflexion of the ankle. No edema, erythema, or ecchymosis noted.           Physical Exam  Cardiovascular:      Pulses:           Dorsalis pedis pulses are 2+ on the right side.         Posterior tibial pulses are 2+ on the right side.   Feet:      Right foot:      Skin integrity: Callus present. No ulcer or erythema.               Right Ankle/Foot Exam     Comments:  Pain on palpation of the central plantar heel and lateral heel. No pain present  with side to side compression of the calcaneus. Negative tinnel's sign  at the tarsal tunnel. Negative Acosta's nerve pain. Negative Calcaneal nerve pain. No soft tissue masses. Pain absent  with dorsiflexion of the ankle. No edema, erythema, or ecchymosis noted.           Muscle Strength   Right Lower Extremity   Ankle Dorsiflexion:  5   Plantar flexion:  5/5    Vascular Exam     Right Pulses  Dorsalis Pedis:      2+  Posterior Tibial:      2+           Imaging: none            Assessment:       1. Plantar fasciitis    2. Right foot pain      Plan:   Plantar fasciitis  -     methylPREDNISolone (MEDROL DOSEPACK) 4 mg tablet; use as directed  Dispense: 21 each; Refill: 0    Right foot pain  -     methylPREDNISolone (MEDROL DOSEPACK) 4 mg tablet; use as directed  Dispense: 21 each; Refill: 0      Follow up if symptoms worsen or fail to improve.    Procedures        Discussed different treatment options for heel pain. I gave written and verbal instructions on heel cord stretching and this was demonstrated for the patient. Patient expressed understanding. Discussed wearing appropriate shoe gear and avoiding flats, slippers, sandals, barefoot, and sockfeet. Recommended arch supports. My recommendation for OTC supports is Spenco polysorb replacement insoles or patient may elect more aggressive treatment with prescription arch supports. We also discussed cortisone injections and NSAID therapy.       Counseling:     I provided patient education verbally regarding:   Patient diagnosis, treatment options, as well as alternatives, risks, and benefits.     This note was created using Dragon voice recognition software that occasionally misinterpreted phrases or words.

## 2024-12-04 NOTE — PATIENT INSTRUCTIONS

## 2024-12-18 ENCOUNTER — PATIENT MESSAGE (OUTPATIENT)
Dept: PODIATRY | Facility: CLINIC | Age: 34
End: 2024-12-18
Payer: COMMERCIAL

## 2025-01-06 ENCOUNTER — OFFICE VISIT (OUTPATIENT)
Dept: PODIATRY | Facility: CLINIC | Age: 35
End: 2025-01-06
Payer: COMMERCIAL

## 2025-01-06 VITALS — WEIGHT: 293 LBS | BODY MASS INDEX: 60.78 KG/M2

## 2025-01-06 DIAGNOSIS — M72.2 PLANTAR FASCIITIS: Primary | ICD-10-CM

## 2025-01-06 DIAGNOSIS — M79.671 PAIN OF RIGHT HEEL: ICD-10-CM

## 2025-01-06 PROCEDURE — 20550 NJX 1 TENDON SHEATH/LIGAMENT: CPT | Mod: RT,S$GLB,, | Performed by: PODIATRIST

## 2025-01-06 PROCEDURE — 1159F MED LIST DOCD IN RCRD: CPT | Mod: CPTII,S$GLB,, | Performed by: PODIATRIST

## 2025-01-06 PROCEDURE — 99999 PR PBB SHADOW E&M-EST. PATIENT-LVL III: CPT | Mod: PBBFAC,,, | Performed by: PODIATRIST

## 2025-01-06 PROCEDURE — 99213 OFFICE O/P EST LOW 20 MIN: CPT | Mod: 25,S$GLB,, | Performed by: PODIATRIST

## 2025-01-06 PROCEDURE — 3008F BODY MASS INDEX DOCD: CPT | Mod: CPTII,S$GLB,, | Performed by: PODIATRIST

## 2025-01-06 PROCEDURE — 1160F RVW MEDS BY RX/DR IN RCRD: CPT | Mod: CPTII,S$GLB,, | Performed by: PODIATRIST

## 2025-01-06 RX ORDER — BUPIVACAINE HYDROCHLORIDE 5 MG/ML
1.5 INJECTION, SOLUTION PERINEURAL
Status: COMPLETED | OUTPATIENT
Start: 2025-01-06 | End: 2025-01-06

## 2025-01-06 RX ORDER — DEXAMETHASONE SODIUM PHOSPHATE 4 MG/ML
4 INJECTION, SOLUTION INTRA-ARTICULAR; INTRALESIONAL; INTRAMUSCULAR; INTRAVENOUS; SOFT TISSUE
Status: COMPLETED | OUTPATIENT
Start: 2025-01-06 | End: 2025-01-06

## 2025-01-06 RX ORDER — METHYLPREDNISOLONE ACETATE 40 MG/ML
20 INJECTION, SUSPENSION INTRA-ARTICULAR; INTRALESIONAL; INTRAMUSCULAR; SOFT TISSUE
Status: COMPLETED | OUTPATIENT
Start: 2025-01-06 | End: 2025-01-06

## 2025-01-06 RX ADMIN — METHYLPREDNISOLONE ACETATE 20 MG: 40 INJECTION, SUSPENSION INTRA-ARTICULAR; INTRALESIONAL; INTRAMUSCULAR; SOFT TISSUE at 12:01

## 2025-01-06 RX ADMIN — DEXAMETHASONE SODIUM PHOSPHATE 4 MG: 4 INJECTION, SOLUTION INTRA-ARTICULAR; INTRALESIONAL; INTRAMUSCULAR; INTRAVENOUS; SOFT TISSUE at 12:01

## 2025-01-06 RX ADMIN — BUPIVACAINE HYDROCHLORIDE 7.5 MG: 5 INJECTION, SOLUTION PERINEURAL at 12:01

## 2025-01-06 NOTE — PROGRESS NOTES
[address] [facility] [address]  [place] [address]  Phone: [phone]   [phone]    Patient's PCP:Abby Guaman NP  Referring Provider: No ref. provider found    Subjective:      Chief Complaint:: Heel Pain (RT)    HPI  Petty Donato is a 34 y.o. female who presents today for follow up of plantar fasciitis, RT. PT states the pain has not decreased. PT states when she was done with the steroid medication the pain came back. PT denies any new complaints at this time. PT presents today in normal shoes. PT states she has got insoles, and new shoes with lying minimal relief.    Vitals:    01/06/25 0823   Weight: (!) 170.8 kg (376 lb 8.7 oz)   PainSc:   8      Shoe Size: Mens 9-9.5    Past Surgical History:   Procedure Laterality Date    OPEN REDUCTION AND INTERNAL FIXATION (ORIF) OF FRACTURE OF DISTAL HUMERUS Right 6/23/2023    Procedure: ORIF, FRACTURE, HUMERUS, DISTAL;  Surgeon: Merlin Lemus MD;  Location: [facility];  Service: Orthopedics;  Laterality: Right;    WISDOM TOOTH EXTRACTION       Past Medical History:   Diagnosis Date    Herpes genitalia 2022    Other closed displaced fracture of distal end of right humerus, initial encounter 2023    Psoriasis 2019     Family History   Problem Relation Name Age of Onset    No Known Problems Mother          never been to doctor    Cerebral aneurysm Father      Lung cancer Father      Obesity Sister      Hyperlipidemia Half-brother      Asthma Half-brother          Social History:   Marital Status: Single  Alcohol History:  reports that she does not currently use alcohol.  Tobacco History:  reports that she has never smoked. She has never used smokeless tobacco.  Drug History:  reports no history of drug use.    Review of patient's allergies indicates:  No Known Allergies    Current Outpatient Medications   Medication Sig Dispense Refill    cholecalciferol, vitamin D3, 1,250 mcg (50,000 unit) Tab Take 1,250 mcg by mouth twice a week. 24 tablet 1     desogestreL-ethinyl estradioL (APRI) 0.15-0.03 mg per tablet Take 1 tablet by mouth once daily. 90 tablet 3    ketoconazole (NIZORAL) 2 % shampoo Apply topically twice a week. Leave on for 5 minutes prior to washing off 120 mL 2    meloxicam (MOBIC) 15 MG tablet Take 15 mg by mouth.      methocarbamoL (ROBAXIN) 500 MG Tab       nystatin (MYCOSTATIN) ointment Apply topically 2 (two) times daily. 30 g 1    nystatin (MYCOSTATIN) powder Apply topically 2 (two) times daily as needed (rash). Use as needed for prophylaxis once rash cleared by ointment 30 g 2    phentermine (ADIPEX-P) 37.5 mg tablet Take 37.5 mg by mouth once daily.      sulfamethoxazole-trimethoprim 800-160mg (BACTRIM DS) 800-160 mg Tab Take 1 tablet by mouth 2 (two) times daily.      triamcinolone acetonide 0.1% (KENALOG) 0.1 % ointment Apply topically 2 (two) times daily. 30 g 1    valACYclovir (VALTREX) 500 MG tablet Take 1 tablet (500 mg total) by mouth once daily. 90 tablet 3     No current facility-administered medications for this visit.     Facility-Administered Medications Ordered in Other Visits   Medication Dose Route Frequency Provider Last Rate Last Admin    lactated ringers infusion   Intravenous Continuous Bee Redmond MD 20 mL/hr at 06/23/23 1016 Restarted at 06/23/23 1033       Review of Systems   Constitutional:  Negative for chills, fatigue, fever and unexpected weight change.   HENT:  Negative for hearing loss and trouble swallowing.    Eyes:  Negative for photophobia and visual disturbance.   Respiratory:  Negative for cough, shortness of breath and wheezing.    Cardiovascular:  Negative for chest pain, palpitations and leg swelling.   Gastrointestinal:  Negative for abdominal pain and nausea.   Genitourinary:  Negative for dysuria and frequency.   Musculoskeletal:  Negative for arthralgias, back pain, gait problem, joint swelling, myalgias and neck pain.   Skin:  Negative for rash and wound.   Neurological:  Negative for  tremors, seizures, weakness, numbness and headaches.   Hematological:  Does not bruise/bleed easily.   Psychiatric/Behavioral:  Negative for hallucinations.          Objective:        Physical Exam:   Foot Exam    General  Orientation: alert and oriented to person, place, and time   Affect: appropriate   Gait: unimpaired       Right Foot/Ankle     Inspection and Palpation  Ecchymosis: none  Tenderness: plantar fascia   Swelling: none   Arch: normal  Skin Exam: callus; no drainage, no ulcer and no erythema   Neurovascular  Dorsalis pedis: 2+  Posterior tibial: 2+  Capillary Refill: 2+  Varicose veins: not present  Saphenous nerve sensation: normal  Tibial nerve sensation: normal  Superficial peroneal nerve sensation: normal  Deep peroneal nerve sensation: normal  Sural nerve sensation: normal    Edema  Type of edema: non-pitting    Muscle Strength  Ankle dorsiflexion: 5  Ankle plantar flexion: 5  Ankle inversion: 5  Ankle eversion: 5  Great toe extension: 5  Great toe flexion: 5    Range of Motion    Passive  Ankle dorsiflexion: 5      Tests  Anterior drawer: negative   Talar tilt: negative   PT Tinel's sign: negative    Paresthesia: negative  Comments  Pain on palpation of the central plantar heel and lateral heel. No pain present  with side to side compression of the calcaneus. Negative tinnel's sign  at the tarsal tunnel. Negative Acosta's nerve pain. Negative Calcaneal nerve pain. No soft tissue masses. Pain absent  with dorsiflexion of the ankle. No edema, erythema, or ecchymosis noted.           Physical Exam  Cardiovascular:      Pulses:           Dorsalis pedis pulses are 2+ on the right side.        Posterior tibial pulses are 2+ on the right side.   Feet:      Right foot:      Skin integrity: Callus present. No ulcer or erythema.               Right Ankle/Foot Exam     Comments:  Pain on palpation of the central plantar heel and lateral heel. No pain present  with side to side compression of the calcaneus.  Negative tinnel's sign  at the tarsal tunnel. Negative Acosta's nerve pain. Negative Calcaneal nerve pain. No soft tissue masses. Pain absent  with dorsiflexion of the ankle. No edema, erythema, or ecchymosis noted.           Muscle Strength   Right Lower Extremity   Ankle Dorsiflexion:  5   Plantar flexion:  5/5    Vascular Exam     Right Pulses  Dorsalis Pedis:      2+  Posterior Tibial:      2+           Imaging: none            Assessment:       1. Plantar fasciitis    2. Pain of right heel      Plan:   Plantar fasciitis  -     methylPREDNISolone acetate injection 20 mg  -     BUPivacaine injection 7.5 mg  -     dexAMETHasone injection 4 mg    Pain of right heel  -     methylPREDNISolone acetate injection 20 mg  -     BUPivacaine injection 7.5 mg  -     dexAMETHasone injection 4 mg      Follow up if symptoms worsen or fail to improve.    Discussed different treatment options for heel pain. I gave written and verbal instructions on heel cord stretching and this was demonstrated for the patient. Patient expressed understanding. Discussed wearing appropriate shoe gear and avoiding flats, slippers, sandals, barefoot, and sockfeet. Recommended arch supports. My recommendation for OTC supports is Spenco polysorb replacement insoles or patient may elect more aggressive treatment with prescription arch supports. We also discussed cortisone injections and NSAID therapy.       Procedures Informed consent was obtained.  Time-out was called.  The area was prepped with alcohol, and a steroid injection was performed at right plantar fascia using 1.5 cc of 0.5% Marcaine w/out epi, 1 cc Dexamethasone, 0.5 cc Methylprednisolone. Patient tolerated the procedure well.             Counseling:     I provided patient education verbally regarding:   Patient diagnosis, treatment options, as well as alternatives, risks, and benefits.     This note was created using Dragon voice recognition software that occasionally misinterpreted  phrases or words.

## 2025-01-28 ENCOUNTER — OFFICE VISIT (OUTPATIENT)
Dept: PODIATRY | Facility: CLINIC | Age: 35
End: 2025-01-28
Payer: COMMERCIAL

## 2025-01-28 VITALS — WEIGHT: 293 LBS | BODY MASS INDEX: 62.13 KG/M2

## 2025-01-28 DIAGNOSIS — M79.671 PAIN OF RIGHT HEEL: ICD-10-CM

## 2025-01-28 DIAGNOSIS — M72.2 PLANTAR FASCIITIS: Primary | ICD-10-CM

## 2025-01-28 DIAGNOSIS — M79.671 RIGHT FOOT PAIN: ICD-10-CM

## 2025-01-28 PROCEDURE — 99999 PR PBB SHADOW E&M-EST. PATIENT-LVL III: CPT | Mod: PBBFAC,,, | Performed by: PODIATRIST

## 2025-01-28 RX ORDER — BUPIVACAINE HYDROCHLORIDE 5 MG/ML
1.5 INJECTION, SOLUTION PERINEURAL
Status: COMPLETED | OUTPATIENT
Start: 2025-01-28 | End: 2025-01-28

## 2025-01-28 RX ORDER — DEXAMETHASONE SODIUM PHOSPHATE 4 MG/ML
4 INJECTION, SOLUTION INTRA-ARTICULAR; INTRALESIONAL; INTRAMUSCULAR; INTRAVENOUS; SOFT TISSUE
Status: COMPLETED | OUTPATIENT
Start: 2025-01-28 | End: 2025-01-28

## 2025-01-28 RX ORDER — METHYLPREDNISOLONE ACETATE 40 MG/ML
20 INJECTION, SUSPENSION INTRA-ARTICULAR; INTRALESIONAL; INTRAMUSCULAR; SOFT TISSUE
Status: COMPLETED | OUTPATIENT
Start: 2025-01-28 | End: 2025-01-28

## 2025-01-28 RX ADMIN — METHYLPREDNISOLONE ACETATE 20 MG: 40 INJECTION, SUSPENSION INTRA-ARTICULAR; INTRALESIONAL; INTRAMUSCULAR; SOFT TISSUE at 09:01

## 2025-01-28 RX ADMIN — DEXAMETHASONE SODIUM PHOSPHATE 4 MG: 4 INJECTION, SOLUTION INTRA-ARTICULAR; INTRALESIONAL; INTRAMUSCULAR; INTRAVENOUS; SOFT TISSUE at 09:01

## 2025-01-28 RX ADMIN — BUPIVACAINE HYDROCHLORIDE 7.5 MG: 5 INJECTION, SOLUTION PERINEURAL at 09:01

## 2025-01-28 NOTE — PROGRESS NOTES
[address] [facility] [address]  [place] [address]  Phone: [phone]   [phone]    Patient's PCP:Abby Guaman NP  Referring Provider: No ref. provider found    Subjective:      Chief Complaint:: Heel Pain (RT)    HPI  Petty Donato is a 34 y.o. female who presents today for follow up of RT Plantar fasciitis. PT states the injection did help relieve some of the pain. PT rates the pain now as a 2/10, pt states before the injection the pain was 10/10. PT denies any new complaints. PT states the pain is radiating into the leg and knee. PT presents today in normal shoes.        Vitals:    01/28/25 0816   Weight: (!) 174.6 kg (384 lb 14.8 oz)   PainSc:   3      Shoe Size: 9.5    Past Surgical History:   Procedure Laterality Date    OPEN REDUCTION AND INTERNAL FIXATION (ORIF) OF FRACTURE OF DISTAL HUMERUS Right 6/23/2023    Procedure: ORIF, FRACTURE, HUMERUS, DISTAL;  Surgeon: Merlin Lemus MD;  Location: [facility];  Service: Orthopedics;  Laterality: Right;    WISDOM TOOTH EXTRACTION       Past Medical History:   Diagnosis Date    Herpes genitalia 2022    Other closed displaced fracture of distal end of right humerus, initial encounter 2023    Psoriasis 2019     Family History   Problem Relation Name Age of Onset    No Known Problems Mother          never been to doctor    Cerebral aneurysm Father      Lung cancer Father      Obesity Sister      Hyperlipidemia Half-brother      Asthma Half-brother          Social History:   Marital Status: Single  Alcohol History:  reports that she does not currently use alcohol.  Tobacco History:  reports that she has never smoked. She has never used smokeless tobacco.  Drug History:  reports no history of drug use.    Review of patient's allergies indicates:  No Known Allergies    Current Outpatient Medications   Medication Sig Dispense Refill    cholecalciferol, vitamin D3, 1,250 mcg (50,000 unit) Tab Take 1,250 mcg by mouth twice a week. 24 tablet 1     clobetasol 0.05% (TEMOVATE) 0.05 % Oint Apply topically every evening. Start after completion of Lamisil pills 30 g 3    desogestreL-ethinyl estradioL (APRI) 0.15-0.03 mg per tablet Take 1 tablet by mouth once daily. 90 tablet 3    doxycycline (VIBRAMYCIN) 100 MG Cap Take 1 capsule (100 mg total) by mouth 2 (two) times daily. 14 capsule 0    ketoconazole (NIZORAL) 2 % shampoo Apply topically once daily. Leave on for 5 minutes before washing off 120 mL 1    meloxicam (MOBIC) 15 MG tablet Take 15 mg by mouth.      methocarbamoL (ROBAXIN) 500 MG Tab       metroNIDAZOLE (FLAGYL) 500 MG tablet Take 1 tablet (500 mg total) by mouth every 12 (twelve) hours. 14 tablet 0    nystatin (MYCOSTATIN) ointment Apply topically 2 (two) times daily. 30 g 1    nystatin (MYCOSTATIN) powder Apply topically 2 (two) times daily as needed (rash). Use as needed for prophylaxis once rash cleared by ointment 30 g 2    phentermine (ADIPEX-P) 37.5 mg tablet Take 37.5 mg by mouth once daily.      terbinafine HCL (LAMISIL) 250 mg tablet Take 1 tablet (250 mg total) by mouth once daily. for 10 days 10 tablet 0    triamcinolone acetonide 0.1% (KENALOG) 0.1 % ointment Apply topically 2 (two) times daily. 30 g 1    valACYclovir (VALTREX) 500 MG tablet Take 1 tablet (500 mg total) by mouth once daily. 90 tablet 3     No current facility-administered medications for this visit.     Facility-Administered Medications Ordered in Other Visits   Medication Dose Route Frequency Provider Last Rate Last Admin    lactated ringers infusion   Intravenous Continuous Bee Redmond MD 20 mL/hr at 06/23/23 1016 Restarted at 06/23/23 1033       Review of Systems   Constitutional:  Negative for chills, fatigue, fever and unexpected weight change.   HENT:  Negative for hearing loss and trouble swallowing.    Eyes:  Negative for photophobia and visual disturbance.   Respiratory:  Negative for cough, shortness of breath and wheezing.    Cardiovascular:  Negative for  chest pain, palpitations and leg swelling.   Gastrointestinal:  Negative for abdominal pain and nausea.   Genitourinary:  Negative for dysuria and frequency.   Musculoskeletal:  Negative for arthralgias, back pain, gait problem, joint swelling, myalgias and neck pain.   Skin:  Negative for rash and wound.   Neurological:  Negative for tremors, seizures, weakness, numbness and headaches.   Hematological:  Does not bruise/bleed easily.   Psychiatric/Behavioral:  Negative for hallucinations.          Objective:        Physical Exam:   Foot Exam    General  Orientation: alert and oriented to person, place, and time   Affect: appropriate   Gait: unimpaired       Right Foot/Ankle     Inspection and Palpation  Ecchymosis: none  Tenderness: plantar fascia   Swelling: none   Arch: normal  Skin Exam: callus; no drainage, no ulcer and no erythema   Neurovascular  Dorsalis pedis: 2+  Posterior tibial: 2+  Capillary Refill: 2+  Varicose veins: not present  Saphenous nerve sensation: normal  Tibial nerve sensation: normal  Superficial peroneal nerve sensation: normal  Deep peroneal nerve sensation: normal  Sural nerve sensation: normal    Edema  Type of edema: non-pitting    Muscle Strength  Ankle dorsiflexion: 5  Ankle plantar flexion: 5  Ankle inversion: 5  Ankle eversion: 5  Great toe extension: 5  Great toe flexion: 5    Range of Motion    Passive  Ankle dorsiflexion: 5      Tests  Anterior drawer: negative   Talar tilt: negative   PT Tinel's sign: negative    Paresthesia: negative  Comments  Pain on palpation of the central plantar heel and lateral heel. No pain present  with side to side compression of the calcaneus. Negative tinnel's sign  at the tarsal tunnel. Negative Acosta's nerve pain. Negative Calcaneal nerve pain. No soft tissue masses. Pain absent  with dorsiflexion of the ankle. No edema, erythema, or ecchymosis noted.           Physical Exam  Cardiovascular:      Pulses:           Dorsalis pedis pulses are 2+ on  the right side.        Posterior tibial pulses are 2+ on the right side.   Feet:      Right foot:      Skin integrity: Callus present. No ulcer or erythema.               Right Ankle/Foot Exam     Comments:  Pain on palpation of the central plantar heel and lateral heel. No pain present  with side to side compression of the calcaneus. Negative tinnel's sign  at the tarsal tunnel. Negative Acosta's nerve pain. Negative Calcaneal nerve pain. No soft tissue masses. Pain absent  with dorsiflexion of the ankle. No edema, erythema, or ecchymosis noted.           Muscle Strength   Right Lower Extremity   Ankle Dorsiflexion:  5   Plantar flexion:  5/5    Vascular Exam     Right Pulses  Dorsalis Pedis:      2+  Posterior Tibial:      2+           Imaging: none            Assessment:       1. Plantar fasciitis    2. Pain of right heel    3. Right foot pain      Plan:   Plantar fasciitis  -     methylPREDNISolone acetate injection 20 mg  -     BUPivacaine injection 7.5 mg  -     dexAMETHasone injection 4 mg    Pain of right heel  -     methylPREDNISolone acetate injection 20 mg  -     BUPivacaine injection 7.5 mg  -     dexAMETHasone injection 4 mg    Right foot pain  -     methylPREDNISolone acetate injection 20 mg  -     BUPivacaine injection 7.5 mg  -     dexAMETHasone injection 4 mg      Follow up if symptoms worsen or fail to improve.    We discussed different ongoing treatment options for her chronic plantar fasciitis.  Because she has had significant improvement from the last steroid injection I recommend that we do a 2nd 1.  I discussed with her that if her symptoms do not fully resolve over the next several weeks then we will likely do physical therapy.    Procedures Informed consent was obtained.  Time-out was called.  The area was prepped with alcohol, and a steroid injection was performed at right plantar fascia  using 1.5 cc of 0.5% Marcaine w/out epi, 1 cc Dexamethasone, 0.5 cc Methylprednisolone. Patient  tolerated the procedure well.             Counseling:     I provided patient education verbally regarding:   Patient diagnosis, treatment options, as well as alternatives, risks, and benefits.     This note was created using Dragon voice recognition software that occasionally misinterpreted phrases or words.

## 2025-02-12 ENCOUNTER — OFFICE VISIT (OUTPATIENT)
Dept: ALLERGY | Facility: CLINIC | Age: 35
End: 2025-02-12
Payer: COMMERCIAL

## 2025-02-12 ENCOUNTER — LAB VISIT (OUTPATIENT)
Dept: LAB | Facility: HOSPITAL | Age: 35
End: 2025-02-12
Attending: STUDENT IN AN ORGANIZED HEALTH CARE EDUCATION/TRAINING PROGRAM
Payer: COMMERCIAL

## 2025-02-12 VITALS — BODY MASS INDEX: 63.62 KG/M2 | HEART RATE: 85 BPM | WEIGHT: 293 LBS | OXYGEN SATURATION: 99 %

## 2025-02-12 DIAGNOSIS — T78.1XXD ADVERSE FOOD REACTION, SUBSEQUENT ENCOUNTER: ICD-10-CM

## 2025-02-12 DIAGNOSIS — R19.7 DIARRHEA, UNSPECIFIED TYPE: ICD-10-CM

## 2025-02-12 DIAGNOSIS — R19.7 DIARRHEA, UNSPECIFIED TYPE: Primary | ICD-10-CM

## 2025-02-12 PROCEDURE — 86258 DGP ANTIBODY EACH IG CLASS: CPT | Mod: 59 | Performed by: STUDENT IN AN ORGANIZED HEALTH CARE EDUCATION/TRAINING PROGRAM

## 2025-02-12 PROCEDURE — 1159F MED LIST DOCD IN RCRD: CPT | Mod: CPTII,S$GLB,, | Performed by: STUDENT IN AN ORGANIZED HEALTH CARE EDUCATION/TRAINING PROGRAM

## 2025-02-12 PROCEDURE — 99203 OFFICE O/P NEW LOW 30 MIN: CPT | Mod: S$GLB,,, | Performed by: STUDENT IN AN ORGANIZED HEALTH CARE EDUCATION/TRAINING PROGRAM

## 2025-02-12 PROCEDURE — 36415 COLL VENOUS BLD VENIPUNCTURE: CPT | Mod: PO | Performed by: STUDENT IN AN ORGANIZED HEALTH CARE EDUCATION/TRAINING PROGRAM

## 2025-02-12 PROCEDURE — 3008F BODY MASS INDEX DOCD: CPT | Mod: CPTII,S$GLB,, | Performed by: STUDENT IN AN ORGANIZED HEALTH CARE EDUCATION/TRAINING PROGRAM

## 2025-02-12 PROCEDURE — 99999 PR PBB SHADOW E&M-EST. PATIENT-LVL III: CPT | Mod: PBBFAC,,, | Performed by: STUDENT IN AN ORGANIZED HEALTH CARE EDUCATION/TRAINING PROGRAM

## 2025-02-12 NOTE — PATIENT INSTRUCTIONS
Fluticasone nasal spray needs to be used daily   Azelastine nasal spray can be used as needed twice  day.   Fexofenadine is a non drowsy medication that can be tried instead of cetirizine

## 2025-02-12 NOTE — PROGRESS NOTES
[facility]GY [facility]OLOGY [facility]IN[facility]       HISTORY OF PRESENT ILLNESS   Referral from: Unknown Referring  CC: Testing prior to Bariatric surgery     HPI: Petty Donato is a 34 y.o. female  History obtained from patient.     Patient will be undergoing bariatric surgery in [place] and is wondering if she has any food allergies. She has not had any systemic IgE mediated reactions to any food such as immediate hives, angioedema, shortness of breath, wheezing, or syncope with any foods. There are a few foods she notices some intolerance such as    -Dairy: upset stomach and diarrhea through out the day. Sometimes takes diarrhea meds twice. Has it depends on how her day is going.   -Bread and gluten can cause diarrhea and bloating. States she has several vitamin deficiencies. No GI doctor. Interested in testing.    GERD:noted after seeing ENT because there was swelling over the esophagus for dysphagia. Able to swallow now. Takes as needed meds OTC/ Off them over a year.     Psoriasis in her scalp.OTC shampoos because medications burn. New lesions seen by GYN and has referral to dermatology.     Prior hx of chronic rhinitis worse in the spring. Cetirizine on her all the time.      MEDICAL HISTORY   SurgHx:  Past Surgical History:   Procedure Laterality Date    OPEN REDUCTION AND INTERNAL FIXATION (ORIF) OF FRACTURE OF DISTAL HUMERUS Right 6/23/2023    Procedure: ORIF, FRACTURE, HUMERUS, DISTAL;  Surgeon: Merlin Lemus MD;  Location: [facility];  Service: Orthopedics;  Laterality: Right;    WISDOM TOOTH EXTRACTION          PHYSICAL EXAM   VS: Pulse 85   Wt (!) 178.8 kg (394 lb 2.9 oz)   LMP 01/16/2025 (Approximate)   SpO2 99%   BMI 63.62 kg/m²   GENERAL: NAD, well nourished, well appearing       LABS AND IMAGING     None      ASSESSMENT & PLAN     Inquiry for food allergy testing before bariatric surgery:  discussed that testing is not indicated in the absence of IgE mediated sxs associated with a food(s). We unfortunately do no  have testing for intolerance and rather have testing for sxs consistent with an IgE mediated process and even then there is a high rate of false positive. Food allergy testing is only utilized to confirm a clinical suspicion and extensive food panels are discouraged. False positive can lead to unnecessary avoidance that can lead to limited diets and anxiety. Furthermore, we know that avoidance of foods can actually lead to loss of tolerance and this leads to true allergies or IgE mediated reactions.     -No testing indicated    Patient concern for celiac disease; notes diarrhea and bloating after eating gluten. Noted that this is likely not celiac.     -Celiac panel ordered for today.     Chronic rhinitis: patient with sneezing and congestion every Spring, somewhat controlled with oral antihistamines. Discussed testing but she wishes to defer for now.     -Consider nasal sprays instead for improvement in sxs and avoidance of steroids for sinus pressure and HA  -Can trail fluticasone BID and Azelastine BID as needed starting now to be prepared for the Spring.         Follow up: PRN    I spent a total of 30 minutes on the day of the visit. This includes face to face time and non-face to face time preparing to see the patient (eg, review of tests), obtaining and/or reviewing separately obtained history, documenting clinical information in the electronic or other health record, independently interpreting results and communicating results to the patient/family/caregiver, or care coordinator.        Sergio Roberson MD   [facility]

## 2025-02-17 ENCOUNTER — RESULTS FOLLOW-UP (OUTPATIENT)
Dept: ALLERGY | Facility: CLINIC | Age: 35
End: 2025-02-17

## 2025-02-17 LAB
GLIADIN PEPTIDE IGA SER-ACNC: 1.4 U/ML
GLIADIN PEPTIDE IGG SER-ACNC: 1.1 U/ML
IGA SERPL-MCNC: 178 MG/DL (ref 70–400)
TTG IGA SER-ACNC: 0.5 U/ML
TTG IGG SER-ACNC: 1.5 U/ML

## 2025-02-18 ENCOUNTER — PATIENT MESSAGE (OUTPATIENT)
Dept: FAMILY MEDICINE | Facility: CLINIC | Age: 35
End: 2025-02-18

## 2025-02-18 ENCOUNTER — LAB VISIT (OUTPATIENT)
Dept: LAB | Facility: HOSPITAL | Age: 35
End: 2025-02-18
Payer: COMMERCIAL

## 2025-02-18 ENCOUNTER — OFFICE VISIT (OUTPATIENT)
Dept: FAMILY MEDICINE | Facility: CLINIC | Age: 35
End: 2025-02-18
Payer: COMMERCIAL

## 2025-02-18 VITALS
DIASTOLIC BLOOD PRESSURE: 86 MMHG | BODY MASS INDEX: 47.09 KG/M2 | SYSTOLIC BLOOD PRESSURE: 132 MMHG | HEART RATE: 97 BPM | WEIGHT: 293 LBS | OXYGEN SATURATION: 97 % | RESPIRATION RATE: 18 BRPM | HEIGHT: 66 IN

## 2025-02-18 DIAGNOSIS — B35.4 TINEA CORPORIS: ICD-10-CM

## 2025-02-18 DIAGNOSIS — E66.01 CLASS 3 SEVERE OBESITY DUE TO EXCESS CALORIES WITHOUT SERIOUS COMORBIDITY WITH BODY MASS INDEX (BMI) OF 60.0 TO 69.9 IN ADULT: ICD-10-CM

## 2025-02-18 DIAGNOSIS — E55.9 VITAMIN D DEFICIENCY: ICD-10-CM

## 2025-02-18 DIAGNOSIS — E66.813 CLASS 3 SEVERE OBESITY DUE TO EXCESS CALORIES WITHOUT SERIOUS COMORBIDITY WITH BODY MASS INDEX (BMI) OF 60.0 TO 69.9 IN ADULT: ICD-10-CM

## 2025-02-18 DIAGNOSIS — M72.2 PLANTAR FASCIITIS: ICD-10-CM

## 2025-02-18 DIAGNOSIS — Z76.89 ENCOUNTER TO ESTABLISH CARE: Primary | ICD-10-CM

## 2025-02-18 LAB
ALBUMIN SERPL BCP-MCNC: 3.3 G/DL (ref 3.5–5.2)
ALP SERPL-CCNC: 86 U/L (ref 40–150)
ALT SERPL W/O P-5'-P-CCNC: 20 U/L (ref 10–44)
ANION GAP SERPL CALC-SCNC: 8 MMOL/L (ref 8–16)
AST SERPL-CCNC: 20 U/L (ref 10–40)
BILIRUB SERPL-MCNC: 0.2 MG/DL (ref 0.1–1)
BUN SERPL-MCNC: 12 MG/DL (ref 6–20)
CALCIUM SERPL-MCNC: 9.1 MG/DL (ref 8.7–10.5)
CHLORIDE SERPL-SCNC: 108 MMOL/L (ref 95–110)
CO2 SERPL-SCNC: 24 MMOL/L (ref 23–29)
CREAT SERPL-MCNC: 0.7 MG/DL (ref 0.5–1.4)
EST. GFR  (NO RACE VARIABLE): >60 ML/MIN/1.73 M^2
GLUCOSE SERPL-MCNC: 87 MG/DL (ref 70–110)
POTASSIUM SERPL-SCNC: 4 MMOL/L (ref 3.5–5.1)
PROT SERPL-MCNC: 7.2 G/DL (ref 6–8.4)
SODIUM SERPL-SCNC: 140 MMOL/L (ref 136–145)

## 2025-02-18 PROCEDURE — 36415 COLL VENOUS BLD VENIPUNCTURE: CPT | Mod: PO

## 2025-02-18 PROCEDURE — 99999 PR PBB SHADOW E&M-EST. PATIENT-LVL V: CPT | Mod: PBBFAC,,,

## 2025-02-18 PROCEDURE — 80053 COMPREHEN METABOLIC PANEL: CPT

## 2025-02-18 RX ORDER — PHENTERMINE HYDROCHLORIDE 37.5 MG/1
37.5 TABLET ORAL DAILY
Status: CANCELLED | OUTPATIENT
Start: 2025-02-18

## 2025-02-18 RX ORDER — TERBINAFINE HYDROCHLORIDE 250 MG/1
250 TABLET ORAL DAILY
Qty: 21 TABLET | Refills: 1 | Status: SHIPPED | OUTPATIENT
Start: 2025-02-18 | End: 2025-03-11

## 2025-02-18 RX ORDER — TOPIRAMATE 25 MG/1
25 TABLET ORAL 2 TIMES DAILY
Qty: 60 TABLET | Refills: 0 | Status: SHIPPED | OUTPATIENT
Start: 2025-02-18 | End: 2026-02-18

## 2025-02-18 RX ORDER — TOPIRAMATE 50 MG/1
50 TABLET, FILM COATED ORAL 2 TIMES DAILY
Qty: 60 TABLET | Refills: 0 | Status: SHIPPED | OUTPATIENT
Start: 2025-03-18 | End: 2026-03-18

## 2025-02-18 RX ORDER — TOPIRAMATE 100 MG/1
100 TABLET, FILM COATED ORAL 2 TIMES DAILY
Qty: 60 TABLET | Refills: 11 | Status: SHIPPED | OUTPATIENT
Start: 2025-04-18 | End: 2026-04-18
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
## 2025-02-18 NOTE — PATIENT INSTRUCTIONS
[facility] in [place]  Address: [address] [address]  Phone: [phone]     Mary Tinajero PA-C with  Family Medicine in [place]  Address: [address] [place] [id number]  Phone: [phone]       Carlos Dove,     If you are due for any health screening(s) below please notify me so we can arrange them to be ordered and scheduled to maintain your health. Most healthy patients complete it. Don't lose out on improving your health.     All of your core healthy metrics are met.

## 2025-02-19 ENCOUNTER — RESULTS FOLLOW-UP (OUTPATIENT)
Dept: FAMILY MEDICINE | Facility: CLINIC | Age: 35
End: 2025-02-19

## 2025-03-19 ENCOUNTER — PATIENT MESSAGE (OUTPATIENT)
Dept: PODIATRY | Facility: CLINIC | Age: 35
End: 2025-03-19
Payer: COMMERCIAL

## 2025-03-19 DIAGNOSIS — M79.671 PAIN OF RIGHT HEEL: ICD-10-CM

## 2025-03-19 DIAGNOSIS — M79.671 RIGHT FOOT PAIN: ICD-10-CM

## 2025-03-19 DIAGNOSIS — M72.2 PLANTAR FASCIITIS: Primary | ICD-10-CM

## 2025-03-19 NOTE — TELEPHONE ENCOUNTER
Physical therapy order sent.  X-ray would not show us anything for this.  However, if it is not improving after physical therapy then we order an MRI.

## 2025-03-20 ENCOUNTER — CLINICAL SUPPORT (OUTPATIENT)
Dept: REHABILITATION | Facility: HOSPITAL | Age: 35
End: 2025-03-20
Payer: COMMERCIAL

## 2025-03-20 DIAGNOSIS — R29.898 WEAKNESS OF BOTH LOWER EXTREMITIES: ICD-10-CM

## 2025-03-20 DIAGNOSIS — M79.671 PAIN OF RIGHT HEEL: ICD-10-CM

## 2025-03-20 DIAGNOSIS — M79.671 RIGHT FOOT PAIN: ICD-10-CM

## 2025-03-20 DIAGNOSIS — M72.2 PLANTAR FASCIITIS: ICD-10-CM

## 2025-03-20 DIAGNOSIS — M25.671 DECREASED RANGE OF MOTION OF RIGHT ANKLE: Primary | ICD-10-CM

## 2025-03-20 PROCEDURE — 97530 THERAPEUTIC ACTIVITIES: CPT | Mod: PN

## 2025-03-20 PROCEDURE — 97162 PT EVAL MOD COMPLEX 30 MIN: CPT | Mod: PN

## 2025-03-21 NOTE — PROGRESS NOTES
Outpatient Rehab    Physical Therapy Evaluation    Patient Name: Petty Donato  MRN: [id number]  YOB: 1990  Encounter Date: 3/20/2025    Therapy Diagnosis:   Encounter Diagnoses   Name Primary?    Plantar fasciitis     Pain of right heel     Right foot pain     Decreased range of motion of right ankle Yes    Weakness of both lower extremities      Physician: Solomon Bianchi DPM    Physician Orders: Eval and Treat  Medical Diagnosis: Plantar fasciitis  Pain of right heel  Right foot pain    Visit # / Visits Authorized:  1 / 20  Insurance Authorization Period: 3/19/2025 to 12/31/2025  Date of Evaluation: 3/20/2025  Plan of Care Certification: 3/20/2025 to 05/20/2025     Time In: 1300   Time Out: 1340  Total Time: 40   Total Billable Time:  40    Intake Outcome Measure for FOTO Survey    Therapist reviewed FOTO scores for Petty Donato on 3/20/2025.   FOTO report - see Media section or FOTO account episode details.     Intake Score: 52%         Subjective   History of Present Illness  Petty is a 34 y.o. female who reports to physical therapy with a chief concern of Right Lateral Foot Pain.     The patient reports a medical diagnosis of Plantar Fasciitis.            Dominant Hand: Right  History of Present Condition/Illness: Petty reports a 2 year history of Right lateral foot pain. She was diagnosed with plantar fasciitis and has received 2 injections which have not alleviated her symptoms. She has also tried splints, new shoes, and rolling a frozen water bottle on her foot, but it does not seem to have helped much. She was going to the gym 5 days per week, but due to her foot pain took a break from the gym which helped some, but once she returned her pain continued. She is now going 3 days per week and just modifying her workouts to be low impact. She works as a  and  at a [employer] and has to be on her feet a lot which has not helped. She just wants to find a way to get some relief.      Activities of Daily Living  Social history was obtained from Patient.    General Prior Level of Function Comments: Chronic R foot pain  General Current Level of Function Comments: Worsening R lateral foot pain with weight bearing activities  Patient Responsibilities: Community mobility, Health management, Laundry, Personal ADL, Driving, Home management    Previously independent with activities of daily living? Yes     Currently independent with activities of daily living? Yes          Previously independent with instrumental activities of daily living? Yes     Currently independent with instrumental activities of daily living? Yes              Pain     Patient reports a current pain level of 0/10. Pain at best is reported as 0/10. Pain at worst is reported as 10/10.   Location: Right lateral foot and radiates up into her calf  Clinical Progression (since onset): Worsening  Pain Qualities: Aching, Burning, Radiating  Pain-Relieving Factors: Activity modification  Pain-Aggravating Factors: Walking, Stair climbing, Other (Comment)  Other Pain-Aggravating Factors: weight bearing activities         Treatment History  Treatments  Previously Received Treatments: Yes  Previous Treatments: Other (Comment)  Other Previous Treatments: Injections  Currently Receiving Treatments: No    Living Arrangements  Living Situation  Housing: Home independently  Living Arrangements: Alone        Employment  Patient reports: Does the patient's condition impact their ability to work?  Employment Status: Employed full-time   Works as a  and  at a [employer]       Past Medical History/Physical Systems Review:   Petty Donato  has a past medical history of Herpes genitalia, Other closed displaced fracture of distal end of right humerus, initial encounter, and Psoriasis.    Petty Donato  has a past surgical history that includes [place] tooth extraction and Open reduction and internal fixation (ORIF) of fracture of distal  humerus (Right, 6/23/2023).    Petty has a current medication list which includes the following prescription(s): cholecalciferol (vitamin d3), clobetasol 0.05%, desogestrel-ethinyl estradiol, ketoconazole, meloxicam, methocarbamol, nystatin, nystatin, semaglutide, [START ON 4/18/2025] topiramate, topiramate, topiramate, triamcinolone acetonide 0.1%, and valacyclovir, and the following Facility-Administered Medications: lactated ringers.    Review of patient's allergies indicates:  No Known Allergies     Objective   Posture                 Right ankle/foot exhibits: Calcaneovarus       Ankle/Foot Palpation  Right Ankle/Foot Palpation  Unremarkable: Muscle, Bony Prominence/Bursa, and Tendon/Ligament          Left Ankle/Foot Palpation  Unremarkable: Muscle, Bony Prominence/Bursa, and Tendon/Ligament               Hip Range of Motion   Right Hip   Active (deg) Passive (deg) Pain   Flexion   75     Extension   5     ABduction         ADduction         External Rotation 90/90   35     External Rotation Prone         Internal Rotation 90/90   25     Internal Rotation Prone             Left Hip   Active (deg) Passive (deg) Pain   Flexion   75     Extension   5     ABduction         ADduction         External Rotation 90/90   35     External Rotation Prone         Internal Rotation 90/90   25     Internal Rotation Prone                  Ankle/Foot Range of Motion   Right Ankle/Foot   Active (deg) Passive (deg) Pain   Dorsiflexion (KE) 0 0     Dorsiflexion (KF)         Plantar Flexion 45       Ankle Inversion 25       Ankle Eversion 10       Subtalar Inversion         Subtalar Eversion         Great Toe MTP Flexion         Great Toe MTP Extension         Great Toe IP Flexion             Left Ankle/Foot   Active (deg) Passive (deg) Pain   Dorsiflexion (KE) 5 5     Dorsiflexion (KF)         Plantar Flexion 45       Ankle Inversion 30       Ankle Eversion 15       Subtalar Inversion         Subtalar Eversion         Great Toe  "MTP Flexion         Great Toe MTP Extension         Great Toe IP Flexion                            Hip Strength - Planes of Motion   Right Strength Right Pain Left Strength Left  Pain   Flexion (L2) 5   5     Extension 4-   4-     ABduction 4   4     ADduction           Internal Rotation 5   5     External Rotation 4   4         Ankle/Foot Strength - Planes of Motion   Right Strength Right Pain Left Strength Left  Pain   Dorsiflexion (L4) 5   5     Plantar Flexion (S1) 3-   3     Inversion 5   5     Eversion 5 Yes 5     Great Toe Flexion 5   5     Great Toe Extension (L5) 5   5     Lesser Toes Flexion 5   5     Lesser Toes Extension                  Knee Special Tests  Right Proximal Tibia and Fibula Joint: Normal  Left Proximal Tibia and Fibula Joint: Normal         Ankle/Foot Special Tests  Other Ankle/Foot Tests  Negative: Right Windlass and Left Windlass  Adverse neural tension testing: Sural nerve positive on Right for adverse neural tension and symptoms in calf, no reproduction of pain in pt's lateral foot         Ankle/Foot Joint Mobility  Right Ankle/Foot Joint Mobility  Normal: Proximal Tibia and Fibula Joint, Distal Tibia and Fibula Joint, Midfoot, and Forefoot  Hypomobile: Talocrural Joint and Subtalar Joint  Left Ankle/Foot Joint Mobility  Normal: Proximal Tibia and Fibula Joint, Distal Tibia and Fibula Joint, Subtalar Joint, Midfoot, and Forefoot  Hypomobile: Talocrural Joint              Treatment:  Therapeutic Activity  TA 1: Ankle DF self mobilization with foot on 8" step x10  TA 2: Slump slider x10  TA 3: Arch lifts x10    Time Entry(in minutes):  PT Evaluation (Moderate) Time Entry: 30  Therapeutic Activity Time Entry: 10    Assessment & Plan   Assessment  Petty presents with a condition of Moderate complexity.   Presentation of Symptoms: Changing  Will Comorbidities Impact Care: Yes       Functional Limitations: Activity tolerance, Ambulating on uneven surfaces, Functional mobility, Painful " locomotion/ambulation, Participating in leisure activities, Performing household chores, Range of motion, Standing tolerance  Impairments: Activity intolerance, Impaired physical strength, Pain with functional activity  Personal Factors Affecting Prognosis: Pain    Patient Goal for Therapy (PT): to get some pain relief and learn how to manage her condition  Prognosis: Good  Prognosis Details: Chronicity of condition, BMI   Assessment Details: Petty presents with complaints of Right lateral foot pain that radiates into her calf. She demonstrates decreased Right ankle mobility, bilateral hip weakness, and positive adverse neural tension testing. Her symptoms and deficits are limiting her ability to perform weight bearing activities without increase in pain.     Plan  From a physical therapy perspective, the patient would benefit from: Skilled Rehab Services    Planned therapy interventions include: Therapeutic exercise, Therapeutic activities, Neuromuscular re-education, and Manual therapy.            Visit Frequency: 2 times Per Week for 8 Weeks.       This plan was discussed with Patient.   Discussion participants: Agreed Upon Plan of Care  Plan details: Focus on improving ankle mobility and neural mobility as well as lower extremity strength to offload Right foot, decrease pain, and improve function.           Patient's spiritual, cultural, and educational needs considered and patient agreeable to plan of care and goals.     Education  Education was done with Patient. The patient's learning style includes Listening. The patient Verbalizes understanding.         Home Exercise Program to be performed twice daily        Goals:   Active       Long Term Goals        In 8 weeks        Start:  03/23/25    Expected End:  05/20/25       Pt will improve FOTO score by >/= 10 to demonstrate improved functional mobility.  Pt will be IND with final HEP to maintain/improve strength and mobility gained in PT.   Pt will report  Right lateral foot pain improved by >/= 90% with prolonged weight bearing activities to demonstrate improved condition.   Pt will improve MMT of lower extremity strength deficits to >/= 5/ 5 to improve tolerance for recreation/leisure activities.   Pt will improve Right ankle ROM = to uninvolved side to improve tolerance for ambulation.   Pt will demonstrate negative adverse neural tension testing to demonstrate decreased irritability and improved condition.   Pt goal: Pt will report confidence in managing her condition upon discharge from PT.               Short Term Goals        In 4 weeks        Start:  03/23/25    Expected End:  04/20/25         Pt will be IND with initial HEP to manage symptoms outside of PT.   Pt will report Right foot pain improved by >/= 50% with weight bearing activities to demonstrate improved condition.   Pt will improve MMT of lower extremity strength deficits by >/= 1/5 to improve tolerance for progressing rehab.   Pt will improve Bilateral ankle DF ROM by >= 5 degrees to improve tolerance for ambulation.                  Andres Nicholson, PT, DPT  Board Certified Clinical Specialist in Orthopedic Physical Therapy  Board Certified Clinical Specialist in Sports Physical Therapy

## 2025-03-23 PROBLEM — R29.898 WEAKNESS OF BOTH LOWER EXTREMITIES: Status: ACTIVE | Noted: 2025-03-23

## 2025-03-23 PROBLEM — M25.671 DECREASED RANGE OF MOTION OF RIGHT ANKLE: Status: ACTIVE | Noted: 2025-03-23

## 2025-03-25 ENCOUNTER — CLINICAL SUPPORT (OUTPATIENT)
Dept: REHABILITATION | Facility: HOSPITAL | Age: 35
End: 2025-03-25
Payer: COMMERCIAL

## 2025-03-25 DIAGNOSIS — R29.898 WEAKNESS OF BOTH LOWER EXTREMITIES: ICD-10-CM

## 2025-03-25 DIAGNOSIS — M25.671 DECREASED RANGE OF MOTION OF RIGHT ANKLE: Primary | ICD-10-CM

## 2025-03-25 PROCEDURE — 97110 THERAPEUTIC EXERCISES: CPT | Mod: PN,CQ

## 2025-03-25 PROCEDURE — 97112 NEUROMUSCULAR REEDUCATION: CPT | Mod: PN,CQ

## 2025-03-25 PROCEDURE — 97530 THERAPEUTIC ACTIVITIES: CPT | Mod: PN,CQ

## 2025-03-25 NOTE — PROGRESS NOTES
"  Outpatient Rehab    Physical Therapy Visit    Patient Name: Petty Donato  MRN: [id number]  YOB: 1990  Encounter Date: 3/25/2025    Therapy Diagnosis:   Encounter Diagnoses   Name Primary?    Decreased range of motion of right ankle Yes    Weakness of both lower extremities      Physician: Solomon Bianchi DPM    Physician Orders: Eval and Treat  Medical Diagnosis: Plantar fasciitis  Pain of right heel  Right foot pain    Visit # / Visits Authorized:  2 / 20  Insurance Authorization Period: 3/19/2025 to 12/31/2025  Date of Evaluation: 3/20/2025  Plan of Care Certification: 3/20/25 to 5/20/2025     PT/PTA: PTA   Number of PTA visits since last PT visit:1  Time In: 1300   Time Out: 1400  Total Time: 60   Total Billable Time: 58    FOTO:  Intake Score:  %  Survey Score 1:  %  Survey Score 2:  %         Subjective   "The home exercises have really been helping." Arrives with no pain..  Pain reported as 0/10.      Objective            Treatment:  Therapeutic Exercise  TE 1: DF self mobs with BTB on stool 5 sec holds x 15 reps  Manual Therapy  MT 1: TC mobs grade III  MT 2: subtalar mobs grade III  Balance/Neuromuscular Re-Education  NMR 1: Slump sliders x 20 reps  NMR 2: Heel slides 5 sec holds x 20 reps  NMR 3: Seated DF with toe curl 3 x 10 rep  NMR 4: Arch lift 5 sec holds 3 x 10 reps  NMR 5: toe yoga x 20 reps  NMR 6: sidelying hip abduction 3 x 10 reps  NMR 7: Clams RTB 3 x 10 reps  Therapeutic Activity  TA 1: shuttle double leg 150 lbs 3 x 10 reps; single leg 75 lbs 3 x 10 reps  TA 2: lateral stepping with RTB 3 x 10 reps    Time Entry(in minutes):  Neuromuscular Re-Education Time Entry: 38  Therapeutic Activity Time Entry: 10  Therapeutic Exercise Time Entry: 10    Assessment & Plan   Assessment: Petty noted with good tolerance to first visit post IE. Treatment this date focused on improving ROM and strength deficits as listed on IE. She required cueing as expected with good response noted. Petty " will continue to benefit from skilled PT to maximzie strength gains as able to return to recreational exercise without pain or limitaitons.       Patient will continue to benefit from skilled outpatient physical therapy to address the deficits listed in the problem list box on initial evaluation, provide pt/family education and to maximize pt's level of independence in the home and community environment.     Patient's spiritual, cultural, and educational needs considered and patient agreeable to plan of care and goals.           Plan: Progress as per POC.    Goals:   Active       Long Term Goals        In 8 weeks  (Progressing)       Start:  03/23/25    Expected End:  05/20/25       Pt will improve FOTO score by >/= 10 to demonstrate improved functional mobility.  Pt will be IND with final HEP to maintain/improve strength and mobility gained in PT.   Pt will report Right lateral foot pain improved by >/= 90% with prolonged weight bearing activities to demonstrate improved condition.   Pt will improve MMT of lower extremity strength deficits to >/= 5/ 5 to improve tolerance for recreation/leisure activities.   Pt will improve Right ankle ROM = to uninvolved side to improve tolerance for ambulation.   Pt will demonstrate negative adverse neural tension testing to demonstrate decreased irritability and improved condition.   Pt goal: Pt will report confidence in managing her condition upon discharge from PT.               Short Term Goals        In 4 weeks  (Progressing)       Start:  03/23/25    Expected End:  04/20/25         Pt will be IND with initial HEP to manage symptoms outside of PT.   Pt will report Right foot pain improved by >/= 50% with weight bearing activities to demonstrate improved condition.   Pt will improve MMT of lower extremity strength deficits by >/= 1/5 to improve tolerance for progressing rehab.   Pt will improve Bilateral ankle DF ROM by >= 5 degrees to improve tolerance for ambulation.                   Susan Thakkar, PTA

## 2025-03-27 ENCOUNTER — CLINICAL SUPPORT (OUTPATIENT)
Dept: REHABILITATION | Facility: HOSPITAL | Age: 35
End: 2025-03-27
Payer: COMMERCIAL

## 2025-03-27 DIAGNOSIS — R29.898 WEAKNESS OF BOTH LOWER EXTREMITIES: ICD-10-CM

## 2025-03-27 DIAGNOSIS — M25.671 DECREASED RANGE OF MOTION OF RIGHT ANKLE: Primary | ICD-10-CM

## 2025-03-27 PROCEDURE — 97110 THERAPEUTIC EXERCISES: CPT | Mod: PN

## 2025-03-27 PROCEDURE — 97530 THERAPEUTIC ACTIVITIES: CPT | Mod: PN

## 2025-03-27 PROCEDURE — 97140 MANUAL THERAPY 1/> REGIONS: CPT | Mod: PN

## 2025-03-27 PROCEDURE — 97112 NEUROMUSCULAR REEDUCATION: CPT | Mod: PN

## 2025-03-27 NOTE — PROGRESS NOTES
"  Outpatient Rehab    Physical Therapy Visit    Patient Name: Petty Donato  MRN: [id number]  YOB: 1990  Encounter Date: 3/27/2025    Therapy Diagnosis:   Encounter Diagnoses   Name Primary?    Decreased range of motion of right ankle Yes    Weakness of both lower extremities      Physician: Solomon Bianchi DPM    Physician Orders: Eval and Treat  Medical Diagnosis: Plantar fasciitis  Pain of right heel  Right foot pain    Visit # / Visits Authorized:  3 / 20  Insurance Authorization Period: 3/19/2025 to 12/31/2025  Date of Evaluation: 3/20/2025  Plan of Care Certification: 3/20/25 to 5/20/2025     PT/PTA:     Number of PTA visits since last PT visit:   Time In: 1302   Time Out: 1355  Total Time: 53   Total Billable Time: 53    FOTO:  Intake Score:  %  Survey Score 1:  %  Survey Score 2:  %         Subjective   She was very sore after last visit, hurting today.  Pain reported as 0/10.      Objective            PT extender available to assist with treatment as needed     Treatment:  Therapeutic Exercise  TE 1: DF self mobs on 8" step x20 with 5s hold  Manual Therapy  MT 1: TC mobs grade III  MT 2: subtalar mobs grade III  Balance/Neuromuscular Re-Education  NMR 1: Slump sliders x 20 reps  NMR 2: Heel slides 5 sec holds x 20 reps  NMR 3: Seated DF with toe curl 3 x 10 rep  NMR 4: Arch lift 5 sec holds 3 x 10 reps  NMR 5: toe yoga x 20 reps  NMR 6: sidelying hip abduction 3 x 10 reps  NMR 7: Clams RTB 3 x 10 reps  Therapeutic Activity  TA 1: shuttle double leg 150 lbs 3 x 10 reps; single leg 75 lbs 3 x 10 reps    Time Entry(in minutes):  Manual Therapy Time Entry: 8  Neuromuscular Re-Education Time Entry: 31  Therapeutic Activity Time Entry: 10  Therapeutic Exercise Time Entry: 4    Assessment & Plan   Assessment: Petty presents to treatment with reports of increased irritability, however following exercises focused on improving ankle and neural mobility as well as hip strength she reports decreased " symptoms. Will progress as appropriate.  Evaluation/Treatment Tolerance: Patient tolerated treatment well    Patient will continue to benefit from skilled outpatient physical therapy to address the deficits listed in the problem list box on initial evaluation, provide pt/family education and to maximize pt's level of independence in the home and community environment.     Patient's spiritual, cultural, and educational needs considered and patient agreeable to plan of care and goals.           Plan: Focus on improving ankle mobility, neural mobility, and hip strength/endurance.    Goals:   Active       Long Term Goals        In 8 weeks  (Progressing)       Start:  03/23/25    Expected End:  05/20/25       Pt will improve FOTO score by >/= 10 to demonstrate improved functional mobility.  Pt will be IND with final HEP to maintain/improve strength and mobility gained in PT.   Pt will report Right lateral foot pain improved by >/= 90% with prolonged weight bearing activities to demonstrate improved condition.   Pt will improve MMT of lower extremity strength deficits to >/= 5/ 5 to improve tolerance for recreation/leisure activities.   Pt will improve Right ankle ROM = to uninvolved side to improve tolerance for ambulation.   Pt will demonstrate negative adverse neural tension testing to demonstrate decreased irritability and improved condition.   Pt goal: Pt will report confidence in managing her condition upon discharge from PT.               Short Term Goals        In 4 weeks  (Progressing)       Start:  03/23/25    Expected End:  04/20/25         Pt will be IND with initial HEP to manage symptoms outside of PT.   Pt will report Right foot pain improved by >/= 50% with weight bearing activities to demonstrate improved condition.   Pt will improve MMT of lower extremity strength deficits by >/= 1/5 to improve tolerance for progressing rehab.   Pt will improve Bilateral ankle DF ROM by >= 5 degrees to improve tolerance  for ambulation.                  Andres Nicholson, PT, DPT

## 2025-04-03 ENCOUNTER — CLINICAL SUPPORT (OUTPATIENT)
Dept: REHABILITATION | Facility: HOSPITAL | Age: 35
End: 2025-04-03
Payer: COMMERCIAL

## 2025-04-03 DIAGNOSIS — R29.898 WEAKNESS OF BOTH LOWER EXTREMITIES: ICD-10-CM

## 2025-04-03 DIAGNOSIS — M25.671 DECREASED RANGE OF MOTION OF RIGHT ANKLE: Primary | ICD-10-CM

## 2025-04-03 PROCEDURE — 97110 THERAPEUTIC EXERCISES: CPT | Mod: PN

## 2025-04-03 PROCEDURE — 97112 NEUROMUSCULAR REEDUCATION: CPT | Mod: PN

## 2025-04-03 PROCEDURE — 97140 MANUAL THERAPY 1/> REGIONS: CPT | Mod: PN

## 2025-04-03 PROCEDURE — 97530 THERAPEUTIC ACTIVITIES: CPT | Mod: PN

## 2025-04-03 NOTE — PROGRESS NOTES
"  Outpatient Rehab    Physical Therapy Visit    Patient Name: Petty Donato  MRN: [id number]  YOB: 1990  Encounter Date: 4/3/2025    Therapy Diagnosis:   Encounter Diagnoses   Name Primary?    Decreased range of motion of right ankle Yes    Weakness of both lower extremities      Physician: Solomon Bianchi DPM    Physician Orders: Eval and Treat  Medical Diagnosis: Plantar fasciitis  Pain of right heel  Right foot pain    Visit # / Visits Authorized:  4 / 20  Insurance Authorization Period: 3/19/2025 to 12/31/2025  Date of Evaluation: 3/20/2025  Plan of Care Certification: 3/20/25 to 5/20/2025     PT/PTA:     Number of PTA visits since last PT visit:   Time In: 1302   Time Out: 1353  Total Time: 51   Total Billable Time:      FOTO:  Intake Score:  %  Survey Score 1:  %  Survey Score 2:  %         Subjective   Her ankle is doing well today, but her R knee is bothering her.         Objective            PT extender available to assist with treatment as needed     Treatment:  Therapeutic Exercise  TE 1: DF self mobs on 8" step x20 with 5s hold  TE 2: Bridges 3x10  Manual Therapy  MT 1: TC mobs grade III  MT 2: subtalar mobs grade III  Balance/Neuromuscular Re-Education  NMR 1: Slump sliders x 20 reps  NMR 3: Seated DF with toe curl 3 x 10 rep  NMR 4: Arch lift 5 sec holds 3 x 10 reps  NMR 5: toe yoga x 20 reps  NMR 7: Clams RTB 3 x 10 reps  Therapeutic Activity  TA 1: shuttle double leg 150 lbs 3 x 10 reps; single leg 75 lbs 3 x 10 reps  TA 2: lateral stepping with RTB 3 x 10 reps    Time Entry(in minutes):  Manual Therapy Time Entry: 8  Neuromuscular Re-Education Time Entry: 23  Therapeutic Activity Time Entry: 12  Therapeutic Exercise Time Entry: 8    Assessment & Plan   Assessment: Petty presents with reports of improed ankle symptoms, however her knee is bothering her today. Continued focus on improving ankle mobility as well as hip strenght/endurance. Will progress load as " appropriate.  Evaluation/Treatment Tolerance: Patient tolerated treatment well    Patient will continue to benefit from skilled outpatient physical therapy to address the deficits listed in the problem list box on initial evaluation, provide pt/family education and to maximize pt's level of independence in the home and community environment.     Patient's spiritual, cultural, and educational needs considered and patient agreeable to plan of care and goals.           Plan: Focus on improving ankle mobility, neural mobility, and hip strength/endurance.    Goals:   Active       Long Term Goals        In 8 weeks  (Progressing)       Start:  03/23/25    Expected End:  05/20/25       Pt will improve FOTO score by >/= 10 to demonstrate improved functional mobility.  Pt will be IND with final HEP to maintain/improve strength and mobility gained in PT.   Pt will report Right lateral foot pain improved by >/= 90% with prolonged weight bearing activities to demonstrate improved condition.   Pt will improve MMT of lower extremity strength deficits to >/= 5/ 5 to improve tolerance for recreation/leisure activities.   Pt will improve Right ankle ROM = to uninvolved side to improve tolerance for ambulation.   Pt will demonstrate negative adverse neural tension testing to demonstrate decreased irritability and improved condition.   Pt goal: Pt will report confidence in managing her condition upon discharge from PT.               Short Term Goals        In 4 weeks  (Progressing)       Start:  03/23/25    Expected End:  04/20/25         Pt will be IND with initial HEP to manage symptoms outside of PT.   Pt will report Right foot pain improved by >/= 50% with weight bearing activities to demonstrate improved condition.   Pt will improve MMT of lower extremity strength deficits by >/= 1/5 to improve tolerance for progressing rehab.   Pt will improve Bilateral ankle DF ROM by >= 5 degrees to improve tolerance for ambulation.                   Andres Nicholson, PT, DPT

## 2025-04-10 ENCOUNTER — CLINICAL SUPPORT (OUTPATIENT)
Dept: REHABILITATION | Facility: HOSPITAL | Age: 35
End: 2025-04-10
Payer: COMMERCIAL

## 2025-04-10 DIAGNOSIS — R29.898 WEAKNESS OF BOTH LOWER EXTREMITIES: ICD-10-CM

## 2025-04-10 DIAGNOSIS — M25.671 DECREASED RANGE OF MOTION OF RIGHT ANKLE: Primary | ICD-10-CM

## 2025-04-10 PROCEDURE — 97140 MANUAL THERAPY 1/> REGIONS: CPT | Mod: PN

## 2025-04-10 PROCEDURE — 97530 THERAPEUTIC ACTIVITIES: CPT | Mod: PN

## 2025-04-10 PROCEDURE — 97112 NEUROMUSCULAR REEDUCATION: CPT | Mod: PN

## 2025-04-10 PROCEDURE — 97110 THERAPEUTIC EXERCISES: CPT | Mod: PN

## 2025-04-10 NOTE — PROGRESS NOTES
"  Outpatient Rehab    Physical Therapy Visit    Patient Name: Petty Donato  MRN: [id number]  YOB: 1990  Encounter Date: 4/10/2025    Therapy Diagnosis:   Encounter Diagnoses   Name Primary?    Decreased range of motion of right ankle Yes    Weakness of both lower extremities        Physician: Solomon Bianchi DPM    Physician Orders: Eval and Treat  Medical Diagnosis: Plantar fasciitis  Pain of right heel  Right foot pain    Visit # / Visits Authorized:  5 / 20  Insurance Authorization Period: 3/19/2025 to 12/31/2025  Date of Evaluation: 3/20/2025  Plan of Care Certification: 3/20/25 to 5/20/2025     PT/PTA:     Number of PTA visits since last PT visit:   Time In: 1300   Time Out: 1355  Total Time: 55   Total Billable Time: 55    FOTO:  Intake Score:  %  Survey Score 1:  %  Survey Score 2:  %         Subjective   Overall ankle/foot is doing better..  Pain reported as 0/10.      Objective            PT extender available to assist with treatment as needed     Treatment:  Therapeutic Exercise  TE 1: DF self mobs on 8" step x20 with 5s hold  TE 2: Bridges 3x10  Manual Therapy  MT 1: TC mobs grade III  MT 2: subtalar mobs grade III  Balance/Neuromuscular Re-Education  NMR 1: Slump sliders x 20 reps  NMR 2: B heel raises 3x10  NMR 4: Arch lift 5 sec holds 3 x 10 reps standing  NMR 5: toe yoga x 20 reps standing  NMR 7: Clams RTB 3 x 10 reps  Therapeutic Activity  TA 1: shuttle double leg 150 lbs 3 x 10 reps; single leg 75 lbs 3 x 10 reps  TA 2: lateral stepping with RTB 3 x 10 reps  TA 3: Step ups on 6" step 3x10 each side    Time Entry(in minutes):  Manual Therapy Time Entry: 8  Neuromuscular Re-Education Time Entry: 25  Therapeutic Activity Time Entry: 12  Therapeutic Exercise Time Entry: 8    Assessment & Plan   Assessment: Petty continues to report improved symptoms. Able to increase load to foot/ankle with heel raises and step ups today. Will progress as tolerated.  Evaluation/Treatment Tolerance: " Patient tolerated treatment well    Patient will continue to benefit from skilled outpatient physical therapy to address the deficits listed in the problem list box on initial evaluation, provide pt/family education and to maximize pt's level of independence in the home and community environment.     Patient's spiritual, cultural, and educational needs considered and patient agreeable to plan of care and goals.           Plan: Focus on improving ankle mobility, neural mobility, and hip strength/endurance.    Goals:   Active       Long Term Goals        In 8 weeks  (Progressing)       Start:  03/23/25    Expected End:  05/20/25       Pt will improve FOTO score by >/= 10 to demonstrate improved functional mobility.  Pt will be IND with final HEP to maintain/improve strength and mobility gained in PT.   Pt will report Right lateral foot pain improved by >/= 90% with prolonged weight bearing activities to demonstrate improved condition.   Pt will improve MMT of lower extremity strength deficits to >/= 5/ 5 to improve tolerance for recreation/leisure activities.   Pt will improve Right ankle ROM = to uninvolved side to improve tolerance for ambulation.   Pt will demonstrate negative adverse neural tension testing to demonstrate decreased irritability and improved condition.   Pt goal: Pt will report confidence in managing her condition upon discharge from PT.               Short Term Goals        In 4 weeks  (Progressing)       Start:  03/23/25    Expected End:  04/20/25         Pt will be IND with initial HEP to manage symptoms outside of PT.   Pt will report Right foot pain improved by >/= 50% with weight bearing activities to demonstrate improved condition.   Pt will improve MMT of lower extremity strength deficits by >/= 1/5 to improve tolerance for progressing rehab.   Pt will improve Bilateral ankle DF ROM by >= 5 degrees to improve tolerance for ambulation.                  Andres Nicholson, PT, DPT  Board Certified  Clinical Specialist in Orthopedic Physical Therapy  Board Certified Clinical Specialist in Sports Physical Therapy

## 2025-04-17 ENCOUNTER — CLINICAL SUPPORT (OUTPATIENT)
Dept: REHABILITATION | Facility: HOSPITAL | Age: 35
End: 2025-04-17
Payer: COMMERCIAL

## 2025-04-17 DIAGNOSIS — R29.898 WEAKNESS OF BOTH LOWER EXTREMITIES: ICD-10-CM

## 2025-04-17 DIAGNOSIS — M25.671 DECREASED RANGE OF MOTION OF RIGHT ANKLE: Primary | ICD-10-CM

## 2025-04-17 PROCEDURE — 97110 THERAPEUTIC EXERCISES: CPT | Mod: PN,CQ

## 2025-04-17 PROCEDURE — 97140 MANUAL THERAPY 1/> REGIONS: CPT | Mod: PN,CQ

## 2025-04-17 PROCEDURE — 97530 THERAPEUTIC ACTIVITIES: CPT | Mod: PN,CQ

## 2025-04-17 PROCEDURE — 97112 NEUROMUSCULAR REEDUCATION: CPT | Mod: PN,CQ

## 2025-04-17 NOTE — PROGRESS NOTES
"  Outpatient Rehab    Physical Therapy Visit    Patient Name: Petty Donato  MRN: [id number]  YOB: 1990  Encounter Date: 4/17/2025    Therapy Diagnosis:   Encounter Diagnoses   Name Primary?    Decreased range of motion of right ankle Yes    Weakness of both lower extremities        Physician: Solomon Bianchi DPM    Physician Orders: Eval and Treat  Medical Diagnosis: Plantar fasciitis  Pain of right heel  Right foot pain    Visit # / Visits Authorized:  6 / 20  Insurance Authorization Period: 3/19/2025 to 12/31/2025  Date of Evaluation: 3/20/2025  Plan of Care Certification: 3/20/25 to 5/20/2025     PT/PTA:     Number of PTA visits since last PT visit:   Time In: 1300   Time Out: 1400  Total Time: 60   Total Billable Time: 55    FOTO:  Intake Score:  %  Survey Score 1:  %  Survey Score 2:  %         Subjective   Ankle feels a little stiff.  Pain reported as 0/10.      Objective            PT extender available to assist with treatment as needed     Treatment:  Therapeutic Exercise  TE 1: DF self mobs on 8" step x20 with 5s hold  TE 2: Bridges 3x10  Manual Therapy  MT 1: TC mobs grade III  MT 2: subtalar mobs grade III  Balance/Neuromuscular Re-Education  NMR 1: Slump sliders x 20 reps  NMR 2: B heel raises 3x10  NMR 3: Seated DF with toe curl 3 x 10 rep  NMR 4: Arch lift 5 sec holds 3 x 10 reps standing  NMR 5: toe yoga x 20 reps standing  NMR 6: sidelying hip abduction 3 x 10 reps  NMR 7: Clams RTB 3 x 10 reps  Therapeutic Activity  TA 1: shuttle double leg 150 lbs 3 x 10 reps; single leg 75 lbs 3 x 10 reps  TA 2: lateral stepping with RTB 3 x 10 reps  TA 3: Step ups on 6" step 3x10 each side    Time Entry(in minutes):  Manual Therapy Time Entry: 8  Neuromuscular Re-Education Time Entry: 25  Therapeutic Activity Time Entry: 12  Therapeutic Exercise Time Entry: 8    Assessment & Plan   Assessment: Petty tolerated all perscribed exercises well requiring min verbal cueing for proper form. Will progress " towards POC goals as tolerated to improve ROM and strengthening deficits in BLE.       Patient will continue to benefit from skilled outpatient physical therapy to address the deficits listed in the problem list box on initial evaluation, provide pt/family education and to maximize pt's level of independence in the home and community environment.     Patient's spiritual, cultural, and educational needs considered and patient agreeable to plan of care and goals.           Plan: Progress BLE hip strength and ankle mobility to imrpove functional status    Goals:   Active       Long Term Goals        In 8 weeks  (Progressing)       Start:  03/23/25    Expected End:  05/20/25       Pt will improve FOTO score by >/= 10 to demonstrate improved functional mobility.  Pt will be IND with final HEP to maintain/improve strength and mobility gained in PT.   Pt will report Right lateral foot pain improved by >/= 90% with prolonged weight bearing activities to demonstrate improved condition.   Pt will improve MMT of lower extremity strength deficits to >/= 5/ 5 to improve tolerance for recreation/leisure activities.   Pt will improve Right ankle ROM = to uninvolved side to improve tolerance for ambulation.   Pt will demonstrate negative adverse neural tension testing to demonstrate decreased irritability and improved condition.   Pt goal: Pt will report confidence in managing her condition upon discharge from PT.               Short Term Goals        In 4 weeks  (Progressing)       Start:  03/23/25    Expected End:  04/20/25         Pt will be IND with initial HEP to manage symptoms outside of PT.   Pt will report Right foot pain improved by >/= 50% with weight bearing activities to demonstrate improved condition.   Pt will improve MMT of lower extremity strength deficits by >/= 1/5 to improve tolerance for progressing rehab.   Pt will improve Bilateral ankle DF ROM by >= 5 degrees to improve tolerance for ambulation.                 I certify that I was present in the room directing the student in service delivery and guiding them using my skilled judgment. As the co-signing therapist I have reviewed the students documentation and am responsible for the treatment, assessment, and plan.       ROBERT Velázquez, [facility]

## 2025-04-21 ENCOUNTER — DOCUMENTATION ONLY (OUTPATIENT)
Dept: REHABILITATION | Facility: HOSPITAL | Age: 35
End: 2025-04-21
Payer: COMMERCIAL

## 2025-04-28 ENCOUNTER — PATIENT MESSAGE (OUTPATIENT)
Dept: FAMILY MEDICINE | Facility: CLINIC | Age: 35
End: 2025-04-28
Payer: COMMERCIAL

## 2025-05-21 ENCOUNTER — PATIENT MESSAGE (OUTPATIENT)
Dept: FAMILY MEDICINE | Facility: CLINIC | Age: 35
End: 2025-05-21
Payer: COMMERCIAL

## 2025-05-26 ENCOUNTER — TELEPHONE (OUTPATIENT)
Dept: FAMILY MEDICINE | Facility: CLINIC | Age: 35
End: 2025-05-26
Payer: COMMERCIAL

## 2025-05-26 NOTE — TELEPHONE ENCOUNTER
Lissa Vickers [place] Staff     ----- Message from Lissa sent at 5/26/2025  9:50 AM CDT -----  Good morning, Pt dropped off paperwork that she needs filled out. It is in the black box in fp1.Thank you!   no

## 2025-05-28 ENCOUNTER — TELEPHONE (OUTPATIENT)
Dept: FAMILY MEDICINE | Facility: CLINIC | Age: 35
End: 2025-05-28
Payer: COMMERCIAL

## 2025-05-28 NOTE — TELEPHONE ENCOUNTER
Left message for patient to return call in regards to paperwork being completed. Completed paperwork is currently located at the  for  at this time.

## 2025-06-01 ENCOUNTER — PATIENT MESSAGE (OUTPATIENT)
Dept: PODIATRY | Facility: CLINIC | Age: 35
End: 2025-06-01
Payer: COMMERCIAL

## 2025-06-09 ENCOUNTER — PATIENT MESSAGE (OUTPATIENT)
Dept: FAMILY MEDICINE | Facility: CLINIC | Age: 35
End: 2025-06-09
Payer: COMMERCIAL

## 2025-06-16 ENCOUNTER — OFFICE VISIT (OUTPATIENT)
Dept: PODIATRY | Facility: CLINIC | Age: 35
End: 2025-06-16
Payer: COMMERCIAL

## 2025-06-16 VITALS — RESPIRATION RATE: 18 BRPM | WEIGHT: 293 LBS | BODY MASS INDEX: 47.09 KG/M2 | HEIGHT: 66 IN

## 2025-06-16 DIAGNOSIS — M79.671 PAIN OF RIGHT HEEL: ICD-10-CM

## 2025-06-16 DIAGNOSIS — M72.2 PLANTAR FASCIITIS: Primary | ICD-10-CM

## 2025-06-16 DIAGNOSIS — M79.671 RIGHT FOOT PAIN: ICD-10-CM

## 2025-06-16 PROCEDURE — 99999 PR PBB SHADOW E&M-EST. PATIENT-LVL IV: CPT | Mod: PBBFAC,,, | Performed by: PODIATRIST

## 2025-06-16 PROCEDURE — 3008F BODY MASS INDEX DOCD: CPT | Mod: CPTII,S$GLB,, | Performed by: PODIATRIST

## 2025-06-16 PROCEDURE — 1160F RVW MEDS BY RX/DR IN RCRD: CPT | Mod: CPTII,S$GLB,, | Performed by: PODIATRIST

## 2025-06-16 PROCEDURE — 99213 OFFICE O/P EST LOW 20 MIN: CPT | Mod: S$GLB,,, | Performed by: PODIATRIST

## 2025-06-16 PROCEDURE — 1159F MED LIST DOCD IN RCRD: CPT | Mod: CPTII,S$GLB,, | Performed by: PODIATRIST

## 2025-06-16 NOTE — PATIENT INSTRUCTIONS

## 2025-06-16 NOTE — PROGRESS NOTES
"  [address] [facility] [address]  [place] [address]  Phone: [phone]   [phone]    Patient's PCP:Steve Boyer PA-C  Referring Provider: No ref. provider found    Subjective:      Chief Complaint:: Follow-up (Right foot plantar fasciitis )    HPI  Petty Donato is a 35 y.o. female who presents today with for follow-up on complaint of right foot plantar fasciitis pain. The symptoms include aching to nagging pain occasional throbbing. Was ecperinving pain at 4-5/10 up til a week ago when after starting Olumaint medication. Pain has now decreased to 1-2/10.    Vitals:    06/16/25 0816   Resp: 18   Weight: (!) 181.4 kg (399 lb 14.6 oz)   Height: 5' 6" (1.676 m)   PainSc:   2      Shoe Size:     Past Surgical History:   Procedure Laterality Date    OPEN REDUCTION AND INTERNAL FIXATION (ORIF) OF FRACTURE OF DISTAL HUMERUS Right 6/23/2023    Procedure: ORIF, FRACTURE, HUMERUS, DISTAL;  Surgeon: Merlin Lemus MD;  Location: [facility];  Service: Orthopedics;  Laterality: Right;    WISDOM TOOTH EXTRACTION       Past Medical History:   Diagnosis Date    Herpes genitalia 2022    Other closed displaced fracture of distal end of right humerus, initial encounter 2023    Psoriasis 2019     Family History   Problem Relation Name Age of Onset    No Known Problems Mother          never been to doctor    Cerebral aneurysm Father      Lung cancer Father      Obesity Sister      Hyperlipidemia Half-brother      Asthma Half-brother          Social History:   Marital Status: Single  Alcohol History:  reports that she does not currently use alcohol.  Tobacco History:  reports that she has never smoked. She has never used smokeless tobacco.  Drug History:  reports no history of drug use.    Review of patient's allergies indicates:  No Known Allergies    Current Medications[1]    Review of Systems   Constitutional:  Negative for chills, fatigue, fever and unexpected weight change.   HENT:  Negative for hearing loss and " trouble swallowing.    Eyes:  Negative for photophobia and visual disturbance.   Respiratory:  Negative for cough, shortness of breath and wheezing.    Cardiovascular:  Negative for chest pain, palpitations and leg swelling.   Gastrointestinal:  Negative for abdominal pain and nausea.   Genitourinary:  Negative for dysuria and frequency.   Musculoskeletal:  Negative for arthralgias, back pain, gait problem, joint swelling, myalgias and neck pain.   Skin:  Negative for rash and wound.   Neurological:  Negative for tremors, seizures, weakness, numbness and headaches.   Hematological:  Does not bruise/bleed easily.   Psychiatric/Behavioral:  Negative for hallucinations.          Objective:        Physical Exam:   Foot Exam    General  Orientation: alert and oriented to person, place, and time   Affect: appropriate   Gait: unimpaired       Right Foot/Ankle     Inspection and Palpation  Ecchymosis: none  Tenderness: plantar fascia   Swelling: none   Arch: normal  Skin Exam: callus; no drainage, no ulcer and no erythema   Neurovascular  Dorsalis pedis: 2+  Posterior tibial: 2+  Capillary Refill: 2+  Varicose veins: not present  Saphenous nerve sensation: normal  Tibial nerve sensation: normal  Superficial peroneal nerve sensation: normal  Deep peroneal nerve sensation: normal  Sural nerve sensation: normal    Edema  Type of edema: non-pitting    Muscle Strength  Ankle dorsiflexion: 5  Ankle plantar flexion: 5  Ankle inversion: 5  Ankle eversion: 5  Great toe extension: 5  Great toe flexion: 5    Range of Motion    Passive  Ankle dorsiflexion: 5      Tests  Anterior drawer: negative   Talar tilt: negative   PT Tinel's sign: negative    Paresthesia: negative  Comments  Pain on palpation of the central plantar heel and lateral heel. No pain present  with side to side compression of the calcaneus. Negative tinnel's sign  at the tarsal tunnel. Negative Acosta's nerve pain. Negative Calcaneal nerve pain. No soft tissue masses.  Pain absent  with dorsiflexion of the ankle. No edema, erythema, or ecchymosis noted.           Physical Exam  Cardiovascular:      Pulses:           Dorsalis pedis pulses are 2+ on the right side.        Posterior tibial pulses are 2+ on the right side.   Feet:      Right foot:      Skin integrity: Callus present. No ulcer or erythema.               Right Ankle/Foot Exam     Comments:  Pain on palpation of the central plantar heel and lateral heel. No pain present  with side to side compression of the calcaneus. Negative tinnel's sign  at the tarsal tunnel. Negative Acosta's nerve pain. Negative Calcaneal nerve pain. No soft tissue masses. Pain absent  with dorsiflexion of the ankle. No edema, erythema, or ecchymosis noted.           Muscle Strength   Right Lower Extremity   Ankle Dorsiflexion:  5   Plantar flexion:  5/5    Vascular Exam     Right Pulses  Dorsalis Pedis:      2+  Posterior Tibial:      2+           Imaging: none            Assessment:       1. Plantar fasciitis    2. Pain of right heel    3. Right foot pain      Plan:   Plantar fasciitis    Pain of right heel    Right foot pain      Follow up if symptoms worsen or fail to improve.    Discussed different treatment options for heel pain. I gave written and verbal instructions on heel cord stretching and this was demonstrated for the patient. Patient expressed understanding. Discussed wearing appropriate shoe gear and avoiding flats, slippers, sandals, barefoot, and sockfeet. Recommended arch supports. My recommendation for OTC supports is Spenco polysorb replacement insoles or patient may elect more aggressive treatment with prescription arch supports. We also discussed cortisone injections and NSAID therapy.     I discussed with the patient that given that her symptoms have improved so significantly I recommend that she continue on the current oral medication for now.  I did inspect her insoles and these do need to be updated.  Also instructed to  make sure that she is updating her shoe gear regularly.  If her symptoms have not resolved over the next month then we will consider further treatment.    Procedures          Counseling:     I provided patient education verbally regarding:   Patient diagnosis, treatment options, as well as alternatives, risks, and benefits.     This note was created using Dragon voice recognition software that occasionally misinterpreted phrases or words.                      [1]   Current Outpatient Medications   Medication Sig Dispense Refill    baricitinib (OLUMIANT) 4 mg Tab Take one po once daily 30 tablet 2    cholecalciferol, vitamin D3, 1,250 mcg (50,000 unit) Tab Take 1,250 mcg by mouth twice a week. 24 tablet 1    clobetasol 0.05% (TEMOVATE) 0.05 % Oint Apply topically every evening. Start after completion of Lamisil pills 30 g 3    desogestreL-ethinyl estradioL (APRI) 0.15-0.03 mg per tablet Take 1 tablet by mouth once daily. (Patient not taking: Reported on 5/9/2025) 90 tablet 3    fluocinonide (LIDEX) 0.05 % external solution Apply as needed to scalp twice a day for 3 wks or less 60 mL 5    ketoconazole (NIZORAL) 2 % shampoo Apply topically once daily. Leave on for 5 minutes before washing off (Patient not taking: Reported on 5/9/2025) 120 mL 1    meloxicam (MOBIC) 15 MG tablet Take 15 mg by mouth.      methocarbamoL (ROBAXIN) 500 MG Tab  (Patient not taking: Reported on 5/9/2025)      nystatin (MYCOSTATIN) ointment Apply topically 2 (two) times daily. 30 g 1    nystatin (MYCOSTATIN) powder Apply topically 2 (two) times daily as needed (rash). Use as needed for prophylaxis once rash cleared by ointment (Patient not taking: Reported on 4/23/2025) 30 g 2    semaglutide (OZEMPIC) 0.25 mg or 0.5 mg (2 mg/3 mL) pen injector Inject 0.5 mg into the skin every 7 days. (Patient not taking: Reported on 5/9/2025)      topiramate (TOPAMAX) 100 MG tablet Take 1 tablet (100 mg total) by mouth 2 (two) times daily. (Patient not taking:  Reported on 5/9/2025) 60 tablet 11    topiramate (TOPAMAX) 25 MG tablet Take 1 tablet (25 mg total) by mouth 2 (two) times daily. (Patient not taking: Reported on 5/9/2025) 60 tablet 0    topiramate (TOPAMAX) 50 MG tablet Take 1 tablet (50 mg total) by mouth 2 (two) times daily. (Patient not taking: Reported on 5/9/2025) 60 tablet 0    triamcinolone acetonide 0.1% (KENALOG) 0.1 % cream Apply twice daily to rash on genitals for no more than 2 straight wks. (Patient not taking: Reported on 5/9/2025) 80 g 2    triamcinolone acetonide 0.1% (KENALOG) 0.1 % ointment Apply topically 2 (two) times daily. (Patient not taking: Reported on 5/9/2025) 30 g 1    valACYclovir (VALTREX) 500 MG tablet Take 1 tablet (500 mg total) by mouth once daily. 90 tablet 3     No current facility-administered medications for this visit.     Facility-Administered Medications Ordered in Other Visits   Medication Dose Route Frequency Provider Last Rate Last Admin    lactated ringers infusion   Intravenous Continuous Bee Redmond MD 20 mL/hr at 06/23/23 1016 Restarted at 06/23/23 1032

## 2025-07-07 ENCOUNTER — PATIENT MESSAGE (OUTPATIENT)
Dept: FAMILY MEDICINE | Facility: CLINIC | Age: 35
End: 2025-07-07
Payer: COMMERCIAL

## 2025-07-07 ENCOUNTER — PATIENT MESSAGE (OUTPATIENT)
Dept: PODIATRY | Facility: CLINIC | Age: 35
End: 2025-07-07
Payer: COMMERCIAL

## 2025-07-21 ENCOUNTER — OFFICE VISIT (OUTPATIENT)
Dept: FAMILY MEDICINE | Facility: CLINIC | Age: 35
End: 2025-07-21
Payer: COMMERCIAL

## 2025-07-21 ENCOUNTER — LAB VISIT (OUTPATIENT)
Dept: LAB | Facility: HOSPITAL | Age: 35
End: 2025-07-21
Payer: COMMERCIAL

## 2025-07-21 ENCOUNTER — HOSPITAL ENCOUNTER (OUTPATIENT)
Dept: RADIOLOGY | Facility: CLINIC | Age: 35
Discharge: HOME | End: 2025-07-21
Payer: COMMERCIAL

## 2025-07-21 VITALS
SYSTOLIC BLOOD PRESSURE: 128 MMHG | RESPIRATION RATE: 18 BRPM | HEART RATE: 89 BPM | DIASTOLIC BLOOD PRESSURE: 76 MMHG | OXYGEN SATURATION: 100 % | BODY MASS INDEX: 47.09 KG/M2 | HEIGHT: 66 IN | WEIGHT: 293 LBS

## 2025-07-21 DIAGNOSIS — R22.41 LOCALIZED SWELLING OF RIGHT FOOT: ICD-10-CM

## 2025-07-21 DIAGNOSIS — M79.671 RIGHT FOOT PAIN: ICD-10-CM

## 2025-07-21 DIAGNOSIS — M79.671 RIGHT FOOT PAIN: Primary | ICD-10-CM

## 2025-07-21 LAB
ABSOLUTE EOSINOPHIL (OHS): 0.19 K/UL
ABSOLUTE MONOCYTE (OHS): 0.91 K/UL (ref 0.3–1)
ABSOLUTE NEUTROPHIL COUNT (OHS): 6.42 K/UL (ref 1.8–7.7)
ALBUMIN SERPL BCP-MCNC: 3.7 G/DL (ref 3.5–5.2)
ALP SERPL-CCNC: 85 UNIT/L (ref 40–150)
ALT SERPL W/O P-5'-P-CCNC: 14 UNIT/L (ref 10–44)
ANION GAP (OHS): 6 MMOL/L (ref 8–16)
AST SERPL-CCNC: 14 UNIT/L (ref 11–45)
BASOPHILS # BLD AUTO: 0.03 K/UL
BASOPHILS NFR BLD AUTO: 0.3 %
BILIRUB SERPL-MCNC: 0.2 MG/DL (ref 0.1–1)
BUN SERPL-MCNC: 10 MG/DL (ref 6–20)
CALCIUM SERPL-MCNC: 8.9 MG/DL (ref 8.7–10.5)
CHLORIDE SERPL-SCNC: 106 MMOL/L (ref 95–110)
CO2 SERPL-SCNC: 24 MMOL/L (ref 23–29)
CREAT SERPL-MCNC: 0.6 MG/DL (ref 0.5–1.4)
ERYTHROCYTE [DISTWIDTH] IN BLOOD BY AUTOMATED COUNT: 14.6 % (ref 11.5–14.5)
GFR SERPLBLD CREATININE-BSD FMLA CKD-EPI: >60 ML/MIN/1.73/M2
GLUCOSE SERPL-MCNC: 77 MG/DL (ref 70–110)
HCT VFR BLD AUTO: 39.1 % (ref 37–48.5)
HGB BLD-MCNC: 12.1 GM/DL (ref 12–16)
IMM GRANULOCYTES # BLD AUTO: 0.04 K/UL (ref 0–0.04)
IMM GRANULOCYTES NFR BLD AUTO: 0.4 % (ref 0–0.5)
LYMPHOCYTES # BLD AUTO: 1.76 K/UL (ref 1–4.8)
MCH RBC QN AUTO: 30.3 PG (ref 27–31)
MCHC RBC AUTO-ENTMCNC: 30.9 G/DL (ref 32–36)
MCV RBC AUTO: 98 FL (ref 82–98)
NUCLEATED RBC (/100WBC) (OHS): 0 /100 WBC
PLATELET # BLD AUTO: 325 K/UL (ref 150–450)
PMV BLD AUTO: 10.9 FL (ref 9.2–12.9)
POTASSIUM SERPL-SCNC: 3.9 MMOL/L (ref 3.5–5.1)
PROT SERPL-MCNC: 7.3 GM/DL (ref 6–8.4)
RBC # BLD AUTO: 4 M/UL (ref 4–5.4)
RELATIVE EOSINOPHIL (OHS): 2 %
RELATIVE LYMPHOCYTE (OHS): 18.8 % (ref 18–48)
RELATIVE MONOCYTE (OHS): 9.7 % (ref 4–15)
RELATIVE NEUTROPHIL (OHS): 68.8 % (ref 38–73)
SODIUM SERPL-SCNC: 136 MMOL/L (ref 136–145)
URATE SERPL-MCNC: 5 MG/DL (ref 2.4–5.7)
WBC # BLD AUTO: 9.35 K/UL (ref 3.9–12.7)

## 2025-07-21 PROCEDURE — 3074F SYST BP LT 130 MM HG: CPT | Mod: CPTII,S$GLB,,

## 2025-07-21 PROCEDURE — 1160F RVW MEDS BY RX/DR IN RCRD: CPT | Mod: CPTII,S$GLB,,

## 2025-07-21 PROCEDURE — 99999 PR PBB SHADOW E&M-EST. PATIENT-LVL IV: CPT | Mod: PBBFAC,,,

## 2025-07-21 PROCEDURE — 80053 COMPREHEN METABOLIC PANEL: CPT

## 2025-07-21 PROCEDURE — 99213 OFFICE O/P EST LOW 20 MIN: CPT | Mod: S$GLB,,,

## 2025-07-21 PROCEDURE — 73630 X-RAY EXAM OF FOOT: CPT | Mod: 26,RT,, | Performed by: RADIOLOGY

## 2025-07-21 PROCEDURE — 3078F DIAST BP <80 MM HG: CPT | Mod: CPTII,S$GLB,,

## 2025-07-21 PROCEDURE — G2211 COMPLEX E/M VISIT ADD ON: HCPCS | Mod: S$GLB,,,

## 2025-07-21 PROCEDURE — 73630 X-RAY EXAM OF FOOT: CPT | Mod: TC,PO,RT

## 2025-07-21 PROCEDURE — 1159F MED LIST DOCD IN RCRD: CPT | Mod: CPTII,S$GLB,,

## 2025-07-21 PROCEDURE — 36415 COLL VENOUS BLD VENIPUNCTURE: CPT | Mod: PO

## 2025-07-21 PROCEDURE — 85025 COMPLETE CBC W/AUTO DIFF WBC: CPT

## 2025-07-21 PROCEDURE — 3008F BODY MASS INDEX DOCD: CPT | Mod: CPTII,S$GLB,,

## 2025-07-21 PROCEDURE — 84550 ASSAY OF BLOOD/URIC ACID: CPT

## 2025-07-21 RX ORDER — MELOXICAM 15 MG/1
15 TABLET ORAL DAILY PRN
Qty: 30 TABLET | Refills: 2 | Status: SHIPPED | OUTPATIENT
Start: 2025-07-21

## 2025-07-21 NOTE — PROGRESS NOTES
"  Subjective:       Patient ID: Petty Donato is a 35 y.o. female.    Chief Complaint: Foot Swelling    HPI    History of Present Illness    CHIEF COMPLAINT:  Patient presents today for foot pain.    FOOT PAIN:  She reports persistent foot pain localized to the lateral aspect of the foot near the heel. She was previously diagnosed with plantar fasciitis by a podiatrist and received four heel injections without significant improvement. The pain has been severe enough to significantly impair her mobility, reporting she could "barely walk" due to the intensity. She is scheduled for follow-up with her podiatrist to further address the ongoing foot pain and potential treatment options.    FOOT SWELLING:  She reports new onset of right foot swelling during a recent cruise, which is a novel experience for her. The swelling persisted throughout the cruise and continues to be present. She expresses concern about the unusual nature of this symptom, emphasizing that her feet have never swelled like this before.    MEDICAL HISTORY:  She has a prior diagnosis of arthritis by a previous healthcare provider with minimal ongoing follow-up, stating there are limited treatment options available.    MEDICATIONS:  She was previously prescribed meloxicam, which she is no longer taking. She is currently limited to ibuprofen due to medication restrictions and is unable to take aspirin or Tylenol.      ROS:  Cardiovascular: positive lower extremity edema  Musculoskeletal: positive limb pain, positive limb swelling, positive pain with movement          Past Medical History:   Diagnosis Date    Herpes genitalia 2022    Other closed displaced fracture of distal end of right humerus, initial encounter 2023    Psoriasis 2019       Review of patient's allergies indicates:  No Known Allergies    Current Medications[1]    Review of Systems    Objective:      /76 (BP Location: Right arm, Patient Position: Sitting)   Pulse 89   Resp 18   " "Ht 5' 6" (1.676 m)   Wt (!) 184.5 kg (406 lb 12 oz)   SpO2 100%   BMI 65.65 kg/m²   Physical Exam  Vitals reviewed.   Constitutional:       General: She is not in acute distress.     Appearance: Normal appearance. She is obese. She is not ill-appearing, toxic-appearing or diaphoretic.   HENT:      Head: Normocephalic.      Right Ear: External ear normal.      Left Ear: External ear normal.      Nose: Nose normal. No congestion or rhinorrhea.      Mouth/Throat:      Mouth: Mucous membranes are moist.      Pharynx: Oropharynx is clear.   Eyes:      General: No scleral icterus.        Right eye: No discharge.         Left eye: No discharge.      Extraocular Movements: Extraocular movements intact.      Conjunctiva/sclera: Conjunctivae normal.   Cardiovascular:      Rate and Rhythm: Normal rate and regular rhythm.      Pulses: Normal pulses.      Heart sounds: Normal heart sounds. No murmur heard.     No friction rub. No gallop.   Pulmonary:      Effort: Pulmonary effort is normal. No respiratory distress.      Breath sounds: Normal breath sounds. No wheezing, rhonchi or rales.   Chest:      Chest wall: No tenderness.   Musculoskeletal:         General: Swelling and tenderness present. No deformity or signs of injury. Normal range of motion.      Cervical back: Normal range of motion.      Right lower leg: No edema.      Left lower leg: No edema.   Skin:     General: Skin is warm and dry.      Capillary Refill: Capillary refill takes less than 2 seconds.      Coloration: Skin is not jaundiced.      Findings: No bruising, erythema, lesion or rash.   Neurological:      Mental Status: She is alert and oriented to person, place, and time.      Gait: Gait normal.             Assessment:       1. Right foot pain    2. Localized swelling of right foot          Assessment & Plan    IMPRESSION:  - Considered plantar fasciitis as potential diagnosis, but exploring other possibilities due to persistent symptoms despite previous " treatment.  - Checking uric acid level to rule out gout or other inflammatory conditions.  - Evaluated liver function and metabolic panel due to potential effects of current dermatological medication.    PLAN SUMMARY:  - XR Foot ordered to evaluate foot condition  - Metabolic panel and uric acid level ordered to assess liver function and swelling  - Prescribed Meloxicam for pain and arthritis management  - Patient to resume [employer] training as planned  - Review results of labs and XR Foot imaging at next visit  - Follow-up with podiatrist at already scheduled appointment  - Contact office if needed    PLANTAR FASCIITIS:  - Patient reports ongoing pain in the lateral aspect of the foot near the heel despite receiving 4 shots from the podiatrist.  - Diagnosed with plantar fasciitis by podiatrist with persistent issues despite treatment.  - Ordered XR Foot to further evaluate foot condition.  - Prescribed Meloxicam for pain management until the next podiatry appointment, which is already scheduled to address these ongoing issues.    ARTHRITIS OF THE FOOT:  - Diagnosed with arthritis by podiatrist.  - Discussed medication interactions and safety of taking Meloxicam for arthritis management, as it is in the same class as ibuprofen which is allowed with current medication regimen.  - Ordered labs including metabolic panel and uric acid level to monitor condition.    LOCALIZED EDEMA:  - Patient reports swelling in feet that started after stopping medication for vacation and has persisted despite resuming medication.  - Ordered metabolic panel to assess liver function due to medication effects and to evaluate the cause of the swelling.    FOOT PAIN:  - Patient reports inability to ambulate properly due to pain in the lateral aspect of the foot near the heel.  - This pain is likely related to the diagnosed plantar fasciitis and arthritis.  - XR Foot ordered to further evaluate foot condition and determine appropriate  treatment approach.    ALLERGY TO ANALGESICS:  - Patient reports inability to take aspirin and Tylenol due to medication restrictions.  - Prescribed Meloxicam as an alternative for pain and arthritis management after discussing medication interactions and safety considerations.    FOLLOW-UP:  - Patient to resume [employer] training as planned.  - Contact the office if needed.  - Will review results of ordered labs (metabolic panel and uric acid level) and XR Foot imaging at next visit.          Plan:       Right foot pain  -     Uric Acid; Future; Expected date: 07/21/2025  -     X-Ray Foot Complete Right; Future; Expected date: 07/21/2025  -     Comprehensive Metabolic Panel; Future; Expected date: 07/21/2025  -     CBC Auto Differential; Future; Expected date: 07/21/2025  -     meloxicam (MOBIC) 15 MG tablet; Take 1 tablet (15 mg total) by mouth daily as needed for Pain (Take with food. Drink plenty of water).  Dispense: 30 tablet; Refill: 2    Localized swelling of right foot  -     Uric Acid; Future; Expected date: 07/21/2025  -     X-Ray Foot Complete Right; Future; Expected date: 07/21/2025  -     Comprehensive Metabolic Panel; Future; Expected date: 07/21/2025  -     CBC Auto Differential; Future; Expected date: 07/21/2025  -     meloxicam (MOBIC) 15 MG tablet; Take 1 tablet (15 mg total) by mouth daily as needed for Pain (Take with food. Drink plenty of water).  Dispense: 30 tablet; Refill: 2                   Steve Boyer PA-C  Family Medicine Physician Assistant       Future Appointments       Date Provider Specialty Appt Notes    7/21/2025  Radiology Right foot pain    7/21/2025  Lab     7/23/2025 Solomon Bianchi DPM Podiatry foot pain    7/25/2025 Steve Boyer PA-C Family Medicine foot swelling and pain               I spent a total of 20 minutes on the day of the visit.This includes face to face time and non-face to face time preparing to see the patient (eg, review of tests), obtaining and/or  reviewing separately obtained history, documenting clinical information in the electronic or other health record, independently interpreting results and communicating results to the patient/family/caregiver, or care coordinator.      We have addressed [3] Low: 2 or more self-limited or minor problems / 1 stable chronic illness / 1 acute, uncomplicated illness or injury  The complexity of the data reviewed and analyzed for this visit was [3] Limited (Reviewed prior external note, ordered unique testing or reviewed the results of each unique test)   The risk of complications and/or morbidity or mortality are [4] Moderate risk (I.e. prescription drug management / decision regarding minor surgery with identified pt or procedure risk factors / decision regarding elective major surgery without identified pt or procedure risk factors / diagnosis or treatment significantly limited by social determinants of health)   The level of Medical Decision Making for this visit is [3] Low    This note may have been generated with the assistance of ambient listening technology. If used, verbal consent was obtained by the patient and accompanying visitor(s) for the recording of patient appointment to facilitate this note. I attest to having reviewed and edited the generated note for accuracy, though some syntax or spelling errors may persist. Please contact the author of this note for any clarification.       [1]   Current Outpatient Medications:     baricitinib (OLUMIANT) 4 mg Tab, Take one po once daily, Disp: 30 tablet, Rfl: 2    fluocinonide (LIDEX) 0.05 % external solution, Apply as needed to scalp twice a day for 3 wks or less, Disp: 60 mL, Rfl: 5    triamcinolone acetonide 0.1% (KENALOG) 0.1 % cream, Apply twice daily to rash on genitals for no more than 2 straight wks., Disp: 80 g, Rfl: 2    valACYclovir (VALTREX) 500 MG tablet, Take 1 tablet (500 mg total) by mouth once daily., Disp: 90 tablet, Rfl: 3    clobetasol 0.05%  (TEMOVATE) 0.05 % Oint, Apply topically every evening. Start after completion of Lamisil pills (Patient not taking: Reported on 7/21/2025), Disp: 30 g, Rfl: 3    ketoconazole (NIZORAL) 2 % shampoo, Apply topically once daily. Leave on for 5 minutes before washing off (Patient not taking: Reported on 5/9/2025), Disp: 120 mL, Rfl: 1    meloxicam (MOBIC) 15 MG tablet, Take 1 tablet (15 mg total) by mouth daily as needed for Pain (Take with food. Drink plenty of water)., Disp: 30 tablet, Rfl: 2    nystatin (MYCOSTATIN) ointment, Apply topically 2 (two) times daily. (Patient not taking: Reported on 7/21/2025), Disp: 30 g, Rfl: 1    nystatin (MYCOSTATIN) powder, Apply topically 2 (two) times daily as needed (rash). Use as needed for prophylaxis once rash cleared by ointment (Patient not taking: Reported on 4/23/2025), Disp: 30 g, Rfl: 2  No current facility-administered medications for this visit.

## 2025-07-22 ENCOUNTER — PATIENT MESSAGE (OUTPATIENT)
Dept: FAMILY MEDICINE | Facility: CLINIC | Age: 35
End: 2025-07-22
Payer: COMMERCIAL

## 2025-07-22 DIAGNOSIS — M19.90 ARTHRITIS: Primary | ICD-10-CM

## 2025-07-23 ENCOUNTER — OFFICE VISIT (OUTPATIENT)
Dept: PODIATRY | Facility: CLINIC | Age: 35
End: 2025-07-23
Payer: COMMERCIAL

## 2025-07-23 DIAGNOSIS — G89.29 CHRONIC FOOT PAIN, RIGHT: ICD-10-CM

## 2025-07-23 DIAGNOSIS — M72.2 PLANTAR FASCIITIS: Primary | ICD-10-CM

## 2025-07-23 DIAGNOSIS — M79.671 CHRONIC FOOT PAIN, RIGHT: ICD-10-CM

## 2025-07-23 DIAGNOSIS — M79.671 PAIN OF RIGHT HEEL: ICD-10-CM

## 2025-07-23 PROCEDURE — 99999 PR PBB SHADOW E&M-EST. PATIENT-LVL III: CPT | Mod: PBBFAC,,, | Performed by: PODIATRIST

## 2025-07-23 RX ORDER — DEXAMETHASONE SODIUM PHOSPHATE 4 MG/ML
4 INJECTION, SOLUTION INTRA-ARTICULAR; INTRALESIONAL; INTRAMUSCULAR; INTRAVENOUS; SOFT TISSUE
Status: COMPLETED | OUTPATIENT
Start: 2025-07-23 | End: 2025-07-23

## 2025-07-23 RX ORDER — BUPIVACAINE HYDROCHLORIDE 5 MG/ML
1.5 INJECTION, SOLUTION PERINEURAL
Status: COMPLETED | OUTPATIENT
Start: 2025-07-23 | End: 2025-07-23

## 2025-07-23 RX ORDER — METHYLPREDNISOLONE ACETATE 40 MG/ML
20 INJECTION, SUSPENSION INTRA-ARTICULAR; INTRALESIONAL; INTRAMUSCULAR; SOFT TISSUE
Status: COMPLETED | OUTPATIENT
Start: 2025-07-23 | End: 2025-07-23

## 2025-07-23 RX ADMIN — METHYLPREDNISOLONE ACETATE 20 MG: 40 INJECTION, SUSPENSION INTRA-ARTICULAR; INTRALESIONAL; INTRAMUSCULAR; SOFT TISSUE at 11:07

## 2025-07-23 RX ADMIN — BUPIVACAINE HYDROCHLORIDE 7.5 MG: 5 INJECTION, SOLUTION PERINEURAL at 11:07

## 2025-07-23 RX ADMIN — DEXAMETHASONE SODIUM PHOSPHATE 4 MG: 4 INJECTION, SOLUTION INTRA-ARTICULAR; INTRALESIONAL; INTRAMUSCULAR; INTRAVENOUS; SOFT TISSUE at 11:07

## 2025-07-23 NOTE — PATIENT INSTRUCTIONS

## 2025-07-23 NOTE — PROGRESS NOTES
[address] [facility] [address]  [place] [address]  Phone: [phone]   [phone]    Patient's PCP:Steve Boyer PA-C  Referring Provider: No ref. provider found    Subjective:      Chief Complaint:: Foot Pain (Right foot pain increased after falls)    HPI  Petty Donato is a 35 y.o. female who presents today with a complaint of right foot pain history of plantar fasciitis increased after two recent falls.   The symptoms include heel pain increased, swelling. Recent x-ray 7/21/2025    Past Surgical History:   Procedure Laterality Date    OPEN REDUCTION AND INTERNAL FIXATION (ORIF) OF FRACTURE OF DISTAL HUMERUS Right 6/23/2023    Procedure: ORIF, FRACTURE, HUMERUS, DISTAL;  Surgeon: Merlin Lemus MD;  Location: [facility];  Service: Orthopedics;  Laterality: Right;    WISDOM TOOTH EXTRACTION       Past Medical History:   Diagnosis Date    Herpes genitalia 2022    Other closed displaced fracture of distal end of right humerus, initial encounter 2023    Psoriasis 2019     Family History   Problem Relation Name Age of Onset    No Known Problems Mother          never been to doctor    Cerebral aneurysm Father      Lung cancer Father      Obesity Sister      Hyperlipidemia Half-brother      Asthma Half-brother          Social History:   Marital Status: Single  Alcohol History:  reports that she does not currently use alcohol.  Tobacco History:  reports that she has never smoked. She has never used smokeless tobacco.  Drug History:  reports no history of drug use.    Review of patient's allergies indicates:  No Known Allergies    Current Medications[1]    Review of Systems   Constitutional:  Negative for chills, fatigue, fever and unexpected weight change.   HENT:  Negative for hearing loss and trouble swallowing.    Eyes:  Negative for photophobia and visual disturbance.   Respiratory:  Negative for cough, shortness of breath and wheezing.    Cardiovascular:  Negative for chest pain, palpitations and leg  swelling.   Gastrointestinal:  Negative for abdominal pain and nausea.   Genitourinary:  Negative for dysuria and frequency.   Musculoskeletal:  Negative for arthralgias, back pain, gait problem, joint swelling, myalgias and neck pain.   Skin:  Negative for rash and wound.   Neurological:  Negative for tremors, seizures, weakness, numbness and headaches.   Hematological:  Does not bruise/bleed easily.   Psychiatric/Behavioral:  Negative for hallucinations.          Objective:        Physical Exam:   Foot Exam    General  Orientation: alert and oriented to person, place, and time   Affect: appropriate   Gait: unimpaired       Right Foot/Ankle     Inspection and Palpation  Ecchymosis: none  Tenderness: plantar fascia   Swelling: none   Arch: normal  Skin Exam: callus; no drainage, no ulcer and no erythema   Neurovascular  Dorsalis pedis: 2+  Posterior tibial: 2+  Capillary Refill: 2+  Varicose veins: not present  Saphenous nerve sensation: normal  Tibial nerve sensation: normal  Superficial peroneal nerve sensation: normal  Deep peroneal nerve sensation: normal  Sural nerve sensation: normal    Edema  Type of edema: non-pitting    Muscle Strength  Ankle dorsiflexion: 5  Ankle plantar flexion: 5  Ankle inversion: 5  Ankle eversion: 5  Great toe extension: 5  Great toe flexion: 5    Range of Motion    Passive  Ankle dorsiflexion: 5      Tests  Anterior drawer: negative   Talar tilt: negative   PT Tinel's sign: negative    Paresthesia: negative  Comments  Pain on palpation of the central plantar heel and lateral heel. No pain present  with side to side compression of the calcaneus. Negative tinnel's sign  at the tarsal tunnel. Negative Acosta's nerve pain. Negative Calcaneal nerve pain. No soft tissue masses. Pain absent  with dorsiflexion of the ankle. No edema, erythema, or ecchymosis noted.           Physical Exam  Cardiovascular:      Pulses:           Dorsalis pedis pulses are 2+ on the right side.        Posterior  tibial pulses are 2+ on the right side.   Feet:      Right foot:      Skin integrity: Callus present. No ulcer or erythema.               Right Ankle/Foot Exam     Comments:  Pain on palpation of the central plantar heel and lateral heel. No pain present  with side to side compression of the calcaneus. Negative tinnel's sign  at the tarsal tunnel. Negative Acosta's nerve pain. Negative Calcaneal nerve pain. No soft tissue masses. Pain absent  with dorsiflexion of the ankle. No edema, erythema, or ecchymosis noted.           Muscle Strength   Right Lower Extremity   Ankle Dorsiflexion:  5   Plantar flexion:  5/5    Vascular Exam     Right Pulses  Dorsalis Pedis:      2+  Posterior Tibial:      2+           Imaging: X-Ray Foot Complete Right  Narrative: EXAMINATION:  XR FOOT COMPLETE 3 VIEW RIGHT    CLINICAL HISTORY:  . Pain in right foot    TECHNIQUE:  AP, lateral, and oblique views of the right foot were performed.    COMPARISON:  Right foot radiographs 04/18/2023    FINDINGS:  Inferior and superior calcaneal enthesophytes.  Well corticated ossific density adjacent to the dorsal navicular, likely injury or osteophytosis.  No acute, displaced fracture, aggressive osseous abnormality or dislocation.  Impression: No acute osseous abnormality.    Electronically signed by: [employer]  Date:    07/21/2025  Time:    14:22               Assessment:       1. Plantar fasciitis    2. Chronic foot pain, right    3. Pain of right heel      Plan:   Plantar fasciitis  -     MRI Hindfoot WO Contrast RT; Future; Expected date: 07/23/2025  -     methylPREDNISolone acetate injection 20 mg  -     BUPivacaine injection 7.5 mg  -     dexAMETHasone injection 4 mg    Chronic foot pain, right  -     MRI Hindfoot WO Contrast RT; Future; Expected date: 07/23/2025  -     methylPREDNISolone acetate injection 20 mg  -     BUPivacaine injection 7.5 mg  -     dexAMETHasone injection 4 mg    Pain of right heel  -     MRI Hindfoot WO Contrast RT;  Future; Expected date: 07/23/2025  -     methylPREDNISolone acetate injection 20 mg  -     BUPivacaine injection 7.5 mg  -     dexAMETHasone injection 4 mg      Follow up if symptoms worsen or fail to improve, for pending MRI results.    Procedures        Patient's previous outside radiographs were reviewed and independently interpreted by myself today.      I discussed the patient that I do not see evidence of any fracture dislocations on a recent radiographs.  It appears that she has flared her already present plantar fasciitis.  We discussed different ongoing treatment options for this.  Patient is updated her shoes and got new insoles.  She has had previous relief from steroid injection we are going to do 1 today.  Also, given the chronic nature of this which has been nonresponsive to multiple conservative treatments I am going to order an MRI for further evaluation.    Informed consent was obtained.  Time-out was called.  The area was prepped with alcohol, and a steroid injection was performed at right plantar fascia using 1.5 cc of 0.5% Marcaine w/out epi, 1 cc Dexamethasone, 0.5 cc Methylprednisolone. Patient tolerated the procedure well.         Counseling:     I provided patient education verbally regarding:   Patient diagnosis, treatment options, as well as alternatives, risks, and benefits.     This note was created using Dragon voice recognition software that occasionally misinterpreted phrases or words.                      [1]   Current Outpatient Medications   Medication Sig Dispense Refill    baricitinib (OLUMIANT) 4 mg Tab Take one po once daily 30 tablet 2    clobetasol 0.05% (TEMOVATE) 0.05 % Oint Apply topically every evening. Start after completion of Lamisil pills (Patient not taking: Reported on 7/21/2025) 30 g 3    fluocinonide (LIDEX) 0.05 % external solution Apply as needed to scalp twice a day for 3 wks or less 60 mL 5    ketoconazole (NIZORAL) 2 % shampoo Apply topically once daily. Leave  on for 5 minutes before washing off (Patient not taking: Reported on 5/9/2025) 120 mL 1    meloxicam (MOBIC) 15 MG tablet Take 1 tablet (15 mg total) by mouth daily as needed for Pain (Take with food. Drink plenty of water). 30 tablet 2    nystatin (MYCOSTATIN) ointment Apply topically 2 (two) times daily. (Patient not taking: Reported on 7/21/2025) 30 g 1    nystatin (MYCOSTATIN) powder Apply topically 2 (two) times daily as needed (rash). Use as needed for prophylaxis once rash cleared by ointment (Patient not taking: Reported on 4/23/2025) 30 g 2    triamcinolone acetonide 0.1% (KENALOG) 0.1 % cream Apply twice daily to rash on genitals for no more than 2 straight wks. 80 g 2    valACYclovir (VALTREX) 500 MG tablet Take 1 tablet (500 mg total) by mouth once daily. 90 tablet 3     No current facility-administered medications for this visit.

## 2025-08-01 ENCOUNTER — HOSPITAL ENCOUNTER (OUTPATIENT)
Dept: RADIOLOGY | Facility: HOSPITAL | Age: 35
Discharge: HOME | End: 2025-08-01
Attending: PODIATRIST
Payer: COMMERCIAL

## 2025-08-01 DIAGNOSIS — G89.29 CHRONIC FOOT PAIN, RIGHT: ICD-10-CM

## 2025-08-01 DIAGNOSIS — M79.671 CHRONIC FOOT PAIN, RIGHT: ICD-10-CM

## 2025-08-01 DIAGNOSIS — M79.671 PAIN OF RIGHT HEEL: ICD-10-CM

## 2025-08-01 DIAGNOSIS — M72.2 PLANTAR FASCIITIS: ICD-10-CM

## 2025-08-01 PROCEDURE — 73718 MRI LOWER EXTREMITY W/O DYE: CPT | Mod: 26,RT,, | Performed by: RADIOLOGY

## 2025-08-01 PROCEDURE — 73718 MRI LOWER EXTREMITY W/O DYE: CPT | Mod: TC,PO,RT

## 2025-08-06 ENCOUNTER — OFFICE VISIT (OUTPATIENT)
Dept: PODIATRY | Facility: CLINIC | Age: 35
End: 2025-08-06
Payer: COMMERCIAL

## 2025-08-06 VITALS — HEIGHT: 66 IN | OXYGEN SATURATION: 99 % | WEIGHT: 293 LBS | BODY MASS INDEX: 47.09 KG/M2

## 2025-08-06 DIAGNOSIS — M79.671 CHRONIC FOOT PAIN, RIGHT: ICD-10-CM

## 2025-08-06 DIAGNOSIS — G89.29 CHRONIC FOOT PAIN, RIGHT: ICD-10-CM

## 2025-08-06 DIAGNOSIS — M72.2 PLANTAR FASCIITIS: Primary | ICD-10-CM

## 2025-08-06 PROCEDURE — 1160F RVW MEDS BY RX/DR IN RCRD: CPT | Mod: CPTII,S$GLB,, | Performed by: PODIATRIST

## 2025-08-06 PROCEDURE — 99999 PR PBB SHADOW E&M-EST. PATIENT-LVL III: CPT | Mod: PBBFAC,,, | Performed by: PODIATRIST

## 2025-08-06 PROCEDURE — 1159F MED LIST DOCD IN RCRD: CPT | Mod: CPTII,S$GLB,, | Performed by: PODIATRIST

## 2025-08-06 PROCEDURE — 3008F BODY MASS INDEX DOCD: CPT | Mod: CPTII,S$GLB,, | Performed by: PODIATRIST

## 2025-08-06 PROCEDURE — 99214 OFFICE O/P EST MOD 30 MIN: CPT | Mod: S$GLB,,, | Performed by: PODIATRIST

## 2025-08-06 NOTE — PATIENT INSTRUCTIONS

## 2025-08-06 NOTE — PROGRESS NOTES
"  [address] [facility] [address]  [place] [address]  Phone: [phone]   [phone]    Patient's PCP:Steve Boyer PA-C  Referring Provider: No ref. provider found    Subjective:      Chief Complaint:: Results (MRI)    HPI  Petty Donato is a 35 y.o. female who presents today for MRI results. PT states the injection helped for a few days. PT denies any new complaints at this time.  Patient here to review MRI results and discuss further treatment options.      Vitals:    08/06/25 0807   SpO2: 99%   Weight: (!) 186.8 kg (411 lb 13.1 oz)   Height: 5' 6" (1.676 m)   PainSc:   6      Shoe Size: 11    Past Surgical History:   Procedure Laterality Date    OPEN REDUCTION AND INTERNAL FIXATION (ORIF) OF FRACTURE OF DISTAL HUMERUS Right 6/23/2023    Procedure: ORIF, FRACTURE, HUMERUS, DISTAL;  Surgeon: Merlin Lemus MD;  Location: [facility];  Service: Orthopedics;  Laterality: Right;    WISDOM TOOTH EXTRACTION       Past Medical History:   Diagnosis Date    Herpes genitalia 2022    Other closed displaced fracture of distal end of right humerus, initial encounter 2023    Psoriasis 2019     Family History   Problem Relation Name Age of Onset    No Known Problems Mother          never been to doctor    Cerebral aneurysm Father      Lung cancer Father      Obesity Sister      Hyperlipidemia Half-brother      Asthma Half-brother          Social History:   Marital Status: Single  Alcohol History:  reports that she does not currently use alcohol.  Tobacco History:  reports that she has never smoked. She has never used smokeless tobacco.  Drug History:  reports no history of drug use.    Review of patient's allergies indicates:  No Known Allergies    Current Medications[1]    Review of Systems   Constitutional:  Negative for chills, fatigue, fever and unexpected weight change.   HENT:  Negative for hearing loss and trouble swallowing.    Eyes:  Negative for photophobia and visual disturbance.   Respiratory:  Negative for " cough, shortness of breath and wheezing.    Cardiovascular:  Negative for chest pain, palpitations and leg swelling.   Gastrointestinal:  Negative for abdominal pain and nausea.   Genitourinary:  Negative for dysuria and frequency.   Musculoskeletal:  Negative for arthralgias, back pain, gait problem, joint swelling, myalgias and neck pain.   Skin:  Negative for rash and wound.   Neurological:  Negative for tremors, seizures, weakness, numbness and headaches.   Hematological:  Does not bruise/bleed easily.   Psychiatric/Behavioral:  Negative for hallucinations.          Objective:        Physical Exam:   Foot Exam    General  Orientation: alert and oriented to person, place, and time   Affect: appropriate   Gait: unimpaired       Right Foot/Ankle     Inspection and Palpation  Ecchymosis: none  Tenderness: plantar fascia   Swelling: none   Arch: normal  Skin Exam: callus; no drainage, no ulcer and no erythema   Neurovascular  Dorsalis pedis: 2+  Posterior tibial: 2+  Capillary Refill: 2+  Varicose veins: not present  Saphenous nerve sensation: normal  Tibial nerve sensation: normal  Superficial peroneal nerve sensation: normal  Deep peroneal nerve sensation: normal  Sural nerve sensation: normal    Edema  Type of edema: non-pitting    Muscle Strength  Ankle dorsiflexion: 5  Ankle plantar flexion: 5  Ankle inversion: 5  Ankle eversion: 5  Great toe extension: 5  Great toe flexion: 5    Range of Motion    Passive  Ankle dorsiflexion: 5      Tests  Anterior drawer: negative   Talar tilt: negative   PT Tinel's sign: negative    Paresthesia: negative  Comments  Pain on palpation of the infeior medial heel and central plantar heel. No pain present  with side to side compression of the calcaneus. Negative tinnel's sign  at the tarsal tunnel. Negative Acosta's nerve pain. Negative Calcaneal nerve pain. No soft tissue masses. Pain absent  with dorsiflexion of the ankle. No edema, erythema, or ecchymosis noted.              Physical Exam  Cardiovascular:      Pulses:           Dorsalis pedis pulses are 2+ on the right side.        Posterior tibial pulses are 2+ on the right side.   Feet:      Right foot:      Skin integrity: Callus present. No ulcer or erythema.               Right Ankle/Foot Exam     Comments:  Pain on palpation of the infeior medial heel and central plantar heel. No pain present  with side to side compression of the calcaneus. Negative tinnel's sign  at the tarsal tunnel. Negative Acosta's nerve pain. Negative Calcaneal nerve pain. No soft tissue masses. Pain absent  with dorsiflexion of the ankle. No edema, erythema, or ecchymosis noted.             Muscle Strength   Right Lower Extremity   Ankle Dorsiflexion:  5   Plantar flexion:  5/5    Vascular Exam     Right Pulses  Dorsalis Pedis:      2+  Posterior Tibial:      2+           Imaging: MRI Hindfoot WO Contrast RT  Narrative: EXAMINATION:  MRI HINDFOOT WO CONTRAST RT    CLINICAL HISTORY:  Plantar fascial fibromatosisHeel pain, chronic;    COMPARISON:  Radiographs, July 2025; April 2023    FINDINGS:  Multiplanar noncontrast imaging was performed.    Small plantar calcaneal spur is noted.  Best appreciated on sagittal and coronal sequences, there is abnormal thickening of the base of the plantar fascia, with mild signal increase within its substance as well as along its margins.  Minimal subcortical marrow edema involves the calcaneus at the plantar fascia origin.  Findings are compatible with plantar fasciitis.  There is no evidence of plantar fascia mass.    There is mild Achilles tendinosis, with fluid accumulation in the retrocalcaneal bursa.  Correlate for mild retrocalcaneal bursitis.    All other tendons are within normal limits.  Included ligaments are unremarkable.    There is no evidence of fracture or dislocation.  No osseous destructive lesion is identified.  There is mild osteophyte formation along the dorsal aspect of the navicular, with  no other significant degenerative change identified.    Mild nonspecific subcutaneous edema is noted at the ankle.  Impression: 1. MR findings compatible with plantar fasciitis as above.  2. Mild Achilles tendinosis.  Fluid accumulation within the retrocalcaneal bursa.  Correlate for possible retrocalcaneal bursitis.  3. Mild nonspecific subcutaneous edema at the ankle.  4. Minimal talonavicular degenerative/arthritic change.    Electronically signed by: Weston Decker  Date:    08/01/2025  Time:    12:50               Assessment:       1. Plantar fasciitis    2. Chronic foot pain, right      Plan:   Plantar fasciitis    Chronic foot pain, right      Follow up if symptoms worsen or fail to improve, for pt will be scheduled for outpatient surgery.    Discussed different treatment options for heel pain. I gave written and verbal instructions on heel cord stretching and this was demonstrated for the patient. Patient expressed understanding. Discussed wearing appropriate shoe gear and avoiding flats, slippers, sandals, barefoot, and sockfeet. Recommended arch supports. My recommendation for OTC supports is Spenco polysorb replacement insoles or patient may elect more aggressive treatment with prescription arch supports. We also discussed cortisone injections and NSAID therapy.     Patient states that at this time given the chronic nature of her plantar fasciitis which has been nonresponsive to multiple conservative treatment she would like to proceed with surgical intervention.    Patient was educated about the entire pre, hollis and post-operative time period.  They  were educated about the pro's and con's of surgery.  All conservative measures have been exhausted. Patient understands the particular risks involved which may occur in connection with the procedure proposed including pain, swelling, infection, stiffness, decreased ROM, recurrence, rejection, numbness, delayed healing, scar formation.    Patient was  educated that their diagnosis may be surgically treated.  The patient's problem will probably advance and usually will not get better without surgery.  All of the pre-operative treatment plans have been exhausted or will no longer be successful at this point in time.  Patient was told of the possible outcomes and expectations of the surgical procedure.  They  will need to be followed-up post-operatively.  Today, pictures were drawn, questions answered.      We discussed the following surgical procedures: endoscopic plantar fasciotomy, right foot        Procedures          Counseling:     I provided patient education verbally regarding:   Patient diagnosis, treatment options, as well as alternatives, risks, and benefits.     This note was created using Dragon voice recognition software that occasionally misinterpreted phrases or words.                    [1]   Current Outpatient Medications   Medication Sig Dispense Refill    baricitinib (OLUMIANT) 4 mg Tab Take one po once daily 30 tablet 2    clobetasol 0.05% (TEMOVATE) 0.05 % Oint Apply topically every evening. Start after completion of Lamisil pills (Patient not taking: Reported on 7/21/2025) 30 g 3    fluocinonide (LIDEX) 0.05 % external solution Apply as needed to scalp twice a day for 3 wks or less 60 mL 5    ketoconazole (NIZORAL) 2 % shampoo Apply topically once daily. Leave on for 5 minutes before washing off (Patient not taking: Reported on 5/9/2025) 120 mL 1    meloxicam (MOBIC) 15 MG tablet Take 1 tablet (15 mg total) by mouth daily as needed for Pain (Take with food. Drink plenty of water). 30 tablet 2    nystatin (MYCOSTATIN) ointment Apply topically 2 (two) times daily. (Patient not taking: Reported on 7/21/2025) 30 g 1    nystatin (MYCOSTATIN) powder Apply topically 2 (two) times daily as needed (rash). Use as needed for prophylaxis once rash cleared by ointment (Patient not taking: Reported on 4/23/2025) 30 g 2    triamcinolone acetonide 0.1%  (KENALOG) 0.1 % cream Apply twice daily to rash on genitals for no more than 2 straight wks. 80 g 2    valACYclovir (VALTREX) 500 MG tablet Take 1 tablet (500 mg total) by mouth once daily. 90 tablet 3     No current facility-administered medications for this visit.

## 2025-08-08 ENCOUNTER — PATIENT MESSAGE (OUTPATIENT)
Dept: FAMILY MEDICINE | Facility: CLINIC | Age: 35
End: 2025-08-08
Payer: COMMERCIAL

## 2025-08-24 ENCOUNTER — PATIENT MESSAGE (OUTPATIENT)
Dept: PODIATRY | Facility: CLINIC | Age: 35
End: 2025-08-24
Payer: COMMERCIAL

## 2025-08-24 ENCOUNTER — PATIENT MESSAGE (OUTPATIENT)
Dept: FAMILY MEDICINE | Facility: CLINIC | Age: 35
End: 2025-08-24
Payer: COMMERCIAL

## 2025-08-26 ENCOUNTER — HOSPITAL ENCOUNTER (OUTPATIENT)
Dept: RADIOLOGY | Facility: HOSPITAL | Age: 35
Discharge: HOME | End: 2025-08-26
Attending: PHYSICIAN ASSISTANT
Payer: COMMERCIAL

## 2025-08-26 ENCOUNTER — OFFICE VISIT (OUTPATIENT)
Dept: FAMILY MEDICINE | Facility: CLINIC | Age: 35
End: 2025-08-26
Payer: COMMERCIAL

## 2025-08-26 VITALS
WEIGHT: 293 LBS | HEART RATE: 92 BPM | SYSTOLIC BLOOD PRESSURE: 122 MMHG | DIASTOLIC BLOOD PRESSURE: 74 MMHG | BODY MASS INDEX: 47.09 KG/M2 | HEIGHT: 66 IN | OXYGEN SATURATION: 98 %

## 2025-08-26 DIAGNOSIS — R60.0 PEDAL EDEMA: ICD-10-CM

## 2025-08-26 DIAGNOSIS — M79.661 RIGHT CALF PAIN: ICD-10-CM

## 2025-08-26 DIAGNOSIS — M79.661 RIGHT CALF PAIN: Primary | ICD-10-CM

## 2025-08-26 PROCEDURE — 3074F SYST BP LT 130 MM HG: CPT | Mod: CPTII,S$GLB,, | Performed by: PHYSICIAN ASSISTANT

## 2025-08-26 PROCEDURE — 99999 PR PBB SHADOW E&M-EST. PATIENT-LVL IV: CPT | Mod: PBBFAC,,, | Performed by: PHYSICIAN ASSISTANT

## 2025-08-26 PROCEDURE — 93971 EXTREMITY STUDY: CPT | Mod: 26,RT,, | Performed by: RADIOLOGY

## 2025-08-26 PROCEDURE — 3008F BODY MASS INDEX DOCD: CPT | Mod: CPTII,S$GLB,, | Performed by: PHYSICIAN ASSISTANT

## 2025-08-26 PROCEDURE — 3078F DIAST BP <80 MM HG: CPT | Mod: CPTII,S$GLB,, | Performed by: PHYSICIAN ASSISTANT

## 2025-08-26 PROCEDURE — 99214 OFFICE O/P EST MOD 30 MIN: CPT | Mod: S$GLB,,, | Performed by: PHYSICIAN ASSISTANT

## 2025-08-26 PROCEDURE — 1159F MED LIST DOCD IN RCRD: CPT | Mod: CPTII,S$GLB,, | Performed by: PHYSICIAN ASSISTANT

## 2025-08-26 PROCEDURE — 93971 EXTREMITY STUDY: CPT | Mod: TC,PO,RT

## 2025-08-26 PROCEDURE — 1160F RVW MEDS BY RX/DR IN RCRD: CPT | Mod: CPTII,S$GLB,, | Performed by: PHYSICIAN ASSISTANT
